# Patient Record
Sex: MALE | Race: WHITE | Employment: OTHER | URBAN - METROPOLITAN AREA
[De-identification: names, ages, dates, MRNs, and addresses within clinical notes are randomized per-mention and may not be internally consistent; named-entity substitution may affect disease eponyms.]

---

## 2018-11-01 PROBLEM — L72.3 SEBACEOUS CYST: Status: ACTIVE | Noted: 2018-11-01

## 2020-09-18 ENCOUNTER — HOSPITAL ENCOUNTER (EMERGENCY)
Age: 75
Discharge: HOME | End: 2020-09-19
Attending: EMERGENCY MEDICINE
Payer: MEDICARE

## 2020-09-18 ENCOUNTER — APPOINTMENT (OUTPATIENT)
Dept: GENERAL RADIOLOGY | Age: 75
End: 2020-09-18
Attending: EMERGENCY MEDICINE
Payer: MEDICARE

## 2020-09-18 ENCOUNTER — APPOINTMENT (OUTPATIENT)
Dept: CT IMAGING | Age: 75
End: 2020-09-18
Attending: EMERGENCY MEDICINE
Payer: MEDICARE

## 2020-09-18 DIAGNOSIS — E11.9 TYPE 2 DIABETES MELLITUS WITHOUT COMPLICATION, UNSPECIFIED WHETHER LONG TERM INSULIN USE (HCC): ICD-10-CM

## 2020-09-18 DIAGNOSIS — R42 DIZZINESS: Primary | ICD-10-CM

## 2020-09-18 LAB
ALBUMIN SERPL-MCNC: 4.2 G/DL (ref 3.2–4.6)
ALBUMIN/GLOB SERPL: 1.1 {RATIO} (ref 1.2–3.5)
ALP SERPL-CCNC: 96 U/L (ref 50–136)
ALT SERPL-CCNC: 38 U/L (ref 12–65)
ANION GAP SERPL CALC-SCNC: 7 MMOL/L (ref 7–16)
AST SERPL-CCNC: 25 U/L (ref 15–37)
BASOPHILS # BLD: 0.1 K/UL (ref 0–0.2)
BASOPHILS NFR BLD: 1 % (ref 0–2)
BILIRUB SERPL-MCNC: 0.5 MG/DL (ref 0.2–1.1)
BUN SERPL-MCNC: 13 MG/DL (ref 8–23)
CALCIUM SERPL-MCNC: 9.3 MG/DL (ref 8.3–10.4)
CHLORIDE SERPL-SCNC: 102 MMOL/L (ref 98–107)
CO2 SERPL-SCNC: 30 MMOL/L (ref 21–32)
CREAT SERPL-MCNC: 1.34 MG/DL (ref 0.8–1.5)
DIFFERENTIAL METHOD BLD: NORMAL
EOSINOPHIL # BLD: 0.2 K/UL (ref 0–0.8)
EOSINOPHIL NFR BLD: 2 % (ref 0.5–7.8)
ERYTHROCYTE [DISTWIDTH] IN BLOOD BY AUTOMATED COUNT: 13.3 % (ref 11.9–14.6)
GLOBULIN SER CALC-MCNC: 4 G/DL (ref 2.3–3.5)
GLUCOSE SERPL-MCNC: 125 MG/DL (ref 65–100)
HCT VFR BLD AUTO: 46.8 % (ref 41.1–50.3)
HGB BLD-MCNC: 15.2 G/DL (ref 13.6–17.2)
IMM GRANULOCYTES # BLD AUTO: 0 K/UL (ref 0–0.5)
IMM GRANULOCYTES NFR BLD AUTO: 0 % (ref 0–5)
LYMPHOCYTES # BLD: 2.6 K/UL (ref 0.5–4.6)
LYMPHOCYTES NFR BLD: 24 % (ref 13–44)
MAGNESIUM SERPL-MCNC: 2.6 MG/DL (ref 1.8–2.4)
MCH RBC QN AUTO: 29.2 PG (ref 26.1–32.9)
MCHC RBC AUTO-ENTMCNC: 32.5 G/DL (ref 31.4–35)
MCV RBC AUTO: 89.8 FL (ref 79.6–97.8)
MONOCYTES # BLD: 1 K/UL (ref 0.1–1.3)
MONOCYTES NFR BLD: 10 % (ref 4–12)
NEUTS SEG # BLD: 6.7 K/UL (ref 1.7–8.2)
NEUTS SEG NFR BLD: 63 % (ref 43–78)
NRBC # BLD: 0 K/UL (ref 0–0.2)
PLATELET # BLD AUTO: 265 K/UL (ref 150–450)
PMV BLD AUTO: 10.1 FL (ref 9.4–12.3)
POTASSIUM SERPL-SCNC: 3.4 MMOL/L (ref 3.5–5.1)
PROT SERPL-MCNC: 8.2 G/DL (ref 6.3–8.2)
RBC # BLD AUTO: 5.21 M/UL (ref 4.23–5.6)
SODIUM SERPL-SCNC: 139 MMOL/L (ref 136–145)
TROPONIN-HIGH SENSITIVITY: 8.2 PG/ML (ref 0–14)
WBC # BLD AUTO: 10.6 K/UL (ref 4.3–11.1)

## 2020-09-18 PROCEDURE — 85025 COMPLETE CBC W/AUTO DIFF WBC: CPT

## 2020-09-18 PROCEDURE — 83735 ASSAY OF MAGNESIUM: CPT

## 2020-09-18 PROCEDURE — 84484 ASSAY OF TROPONIN QUANT: CPT

## 2020-09-18 PROCEDURE — 99284 EMERGENCY DEPT VISIT MOD MDM: CPT

## 2020-09-18 PROCEDURE — 93005 ELECTROCARDIOGRAM TRACING: CPT

## 2020-09-18 PROCEDURE — 99285 EMERGENCY DEPT VISIT HI MDM: CPT

## 2020-09-18 PROCEDURE — 80053 COMPREHEN METABOLIC PANEL: CPT

## 2020-09-18 PROCEDURE — 71046 X-RAY EXAM CHEST 2 VIEWS: CPT

## 2020-09-19 ENCOUNTER — APPOINTMENT (OUTPATIENT)
Dept: CT IMAGING | Age: 75
End: 2020-09-19
Attending: EMERGENCY MEDICINE
Payer: MEDICARE

## 2020-09-19 VITALS
DIASTOLIC BLOOD PRESSURE: 64 MMHG | HEART RATE: 60 BPM | RESPIRATION RATE: 18 BRPM | SYSTOLIC BLOOD PRESSURE: 139 MMHG | WEIGHT: 220 LBS | TEMPERATURE: 98.6 F | OXYGEN SATURATION: 93 % | BODY MASS INDEX: 32.58 KG/M2 | HEIGHT: 69 IN

## 2020-09-19 LAB
ATRIAL RATE: 75 BPM
CALCULATED P AXIS, ECG09: 32 DEGREES
CALCULATED R AXIS, ECG10: -84 DEGREES
CALCULATED T AXIS, ECG11: 57 DEGREES
DIAGNOSIS, 93000: NORMAL
P-R INTERVAL, ECG05: 184 MS
Q-T INTERVAL, ECG07: 422 MS
QRS DURATION, ECG06: 164 MS
QTC CALCULATION (BEZET), ECG08: 471 MS
VENTRICULAR RATE, ECG03: 75 BPM

## 2020-09-19 PROCEDURE — 70450 CT HEAD/BRAIN W/O DYE: CPT

## 2020-09-19 NOTE — ED NOTES
Report given to LATA VENCES [facility], RN to assume care at this time.

## 2020-09-19 NOTE — ED TRIAGE NOTES
Arrives with face mask in place. Arrives from [place] [id number] with reports \"abnormal ekg\". Presented there with reports dizziness. Onset approx 1 week pta  However states with bending over, resolved with standing back up. Worsening of dizziness today approx 1400 while working in yard. Constant since onset today. Describes as room spinning and feeling \"as if going to pass out\". Denies chest pain, shortness of breath, syncope, shortness of breath, head pain, vision changes, n/v/d, cough, fever/chills, numbness/tingling.  A/O x3 on arrival. Reports \"prediabetes\"

## 2020-09-19 NOTE — ED PROVIDER NOTES
Patient has a history of hypertension and type 2 diabetes. States he was outside mowing his grass and working on the yard today when around 2 PM he started having some dizziness. He had gone from a bending position to standing up and felt dizzy. He states he felt like things were moving around him. His symptoms are worse when he moves around and better when he sits still. He denies any chest pain or shortness of breath. He has had no headache or fever. Denies any known sick contacts. He went to an outside urgent care and was told his EKG was abnormal and was sent here for further evaluation. He denies similar symptoms in the past, did not take any medicine for his symptoms.            Past Medical History:   Diagnosis Date    Arthritis     Hypertension     medication    Type II or unspecified type diabetes mellitus without mention of complication, not stated as uncontrolled 3/9/2015       Past Surgical History:   Procedure Laterality Date    ABDOMEN SURGERY PROC UNLISTED      right inguinal hernia repair    HX HEENT      Lasik    HX HEENT  11/14    skin cancer removed    HX ORTHOPAEDIC      left foot plantar fasciitis    HX ORTHOPAEDIC  3/13    right knee partial knee replacment - Dr. Joey Ly         Family History:   Problem Relation Age of Onset    Arthritis-osteo Mother     Dementia Mother     Hypertension Father     Cancer Father         prostate    Heart Disease Brother        Social History     Socioeconomic History    Marital status:      Spouse name: Not on file    Number of children: Not on file    Years of education: Not on file    Highest education level: Not on file   Occupational History    Not on file   Social Needs    Financial resource strain: Not on file    Food insecurity     Worry: Not on file     Inability: Not on file    Transportation needs     Medical: Not on file     Non-medical: Not on file   Tobacco Use    Smoking status: Former Smoker     Last attempt to quit: 3/12/1980     Years since quittin.5    Smokeless tobacco: Never Used   Substance and Sexual Activity    Alcohol use: No    Drug use: Never    Sexual activity: Not on file   Lifestyle    Physical activity     Days per week: Not on file     Minutes per session: Not on file    Stress: Not on file   Relationships    Social connections     Talks on phone: Not on file     Gets together: Not on file     Attends Congregational service: Not on file     Active member of club or organization: Not on file     Attends meetings of clubs or organizations: Not on file     Relationship status: Not on file    Intimate partner violence     Fear of current or ex partner: Not on file     Emotionally abused: Not on file     Physically abused: Not on file     Forced sexual activity: Not on file   Other Topics Concern    Not on file   Social History Narrative    Not on file         ALLERGIES: Pravachol [pravastatin]    Review of Systems   Constitutional: Negative for chills and fever. Gastrointestinal: Negative for nausea and vomiting. All other systems reviewed and are negative. Vitals:    20 2207   BP: (!) 192/99   Pulse: 80   Resp: 18   Temp: 98.6 °F (37 °C)   SpO2: 96%   Weight: 99.8 kg (220 lb)   Height: 5' 9\" (1.753 m)            Physical Exam  Vitals signs and nursing note reviewed. Constitutional:       Appearance: Normal appearance. He is well-developed. HENT:      Head: Normocephalic and atraumatic. Eyes:      Conjunctiva/sclera: Conjunctivae normal.      Pupils: Pupils are equal, round, and reactive to light. Neck:      Musculoskeletal: Normal range of motion and neck supple. Cardiovascular:      Rate and Rhythm: Normal rate and regular rhythm. Heart sounds: Normal heart sounds. Pulmonary:      Effort: Pulmonary effort is normal.      Breath sounds: Normal breath sounds. Abdominal:      General: Bowel sounds are normal.      Palpations: Abdomen is soft.    Musculoskeletal: Normal range of motion. Skin:     General: Skin is warm and dry. Neurological:      General: No focal deficit present. Mental Status: He is alert and oriented to person, place, and time. Cranial Nerves: No cranial nerve deficit. Sensory: No sensory deficit. Motor: No weakness. Comments: No nystagmus          MDM  Number of Diagnoses or Management Options  Dizziness: new and does not require workup  Type 2 diabetes mellitus without complication, unspecified whether long term insulin use [facility]):   Diagnosis management comments: 12:32 AM discussed results with patient, unremarkable work-up. He has a primary doctor he can follow-up with. His symptoms are very mild and he has no other neurological deficit. The strongly points away from a central lesion causing his dizziness. Discussed this with patient, need for close outpatient follow-up with his primary doctor.        Amount and/or Complexity of Data Reviewed  Clinical lab tests: reviewed and ordered  Tests in the radiology section of CPT®: ordered and reviewed  Tests in the medicine section of CPT®: ordered and reviewed    Risk of Complications, Morbidity, and/or Mortality  Presenting problems: moderate  Diagnostic procedures: moderate  Management options: moderate    Patient Progress  Patient progress: stable         Procedures

## 2020-09-19 NOTE — ED NOTES
I have reviewed discharge instructions with the patient and spouse. The patient and spouse verbalized understanding. Patient left ED via Discharge Method: ambulatory to Home with spouse    Opportunity for questions and clarification provided. Patient given 0 scripts. To continue your aftercare when you leave the hospital, you may receive an automated call from our care team to check in on how you are doing. This is a free service and part of our promise to provide the best care and service to meet your aftercare needs.  If you have questions, or wish to unsubscribe from this service please call [phone]. Thank you for Choosing our [address] [place] [address] Emergency Department.

## 2021-08-23 PROBLEM — N40.1 BPH WITH OBSTRUCTION/LOWER URINARY TRACT SYMPTOMS: Status: ACTIVE | Noted: 2021-08-23

## 2021-08-23 PROBLEM — N13.8 BPH WITH OBSTRUCTION/LOWER URINARY TRACT SYMPTOMS: Status: ACTIVE | Noted: 2021-08-23

## 2022-02-03 ENCOUNTER — HOSPITAL ENCOUNTER (OUTPATIENT)
Dept: PHYSICAL THERAPY | Age: 77
Discharge: HOME | End: 2022-02-03
Attending: SPECIALIST/TECHNOLOGIST
Payer: MEDICARE

## 2022-02-03 DIAGNOSIS — M25.512 LEFT SHOULDER PAIN, UNSPECIFIED CHRONICITY: ICD-10-CM

## 2022-02-03 PROCEDURE — 97162 PT EVAL MOD COMPLEX 30 MIN: CPT

## 2022-02-03 PROCEDURE — 97110 THERAPEUTIC EXERCISES: CPT

## 2022-02-03 NOTE — THERAPY EVALUATION
Elayne Zafar  : 1945  Primary: Valentin Lerma Medicare Advantage  Secondary:  [address] at [facility]  [address] [place], [address] [place] [address] Avenue, [place] pier[place], [address] [place] [address]  [phone]   [phone]       OUTPATIENT PHYSICAL THERAPY:Initial Assessment 2022    ICD-10: Treatment Diagnosis: Pain in Left Shoulder (M25.512)              Treatment Diagnosis 2: Stiffness of Left Shoulder not elsewhere specified (M25.612)              Treatment Diagnosis 3: Strain of muscle(s) and tendon(s) of the rotator cuff of Left Shoulder, sequela (S46.012S)                   Precautions: None  Allergies: Pravachol [pravastatin]   TREATMENT PLAN:  Effective Dates: 2/3/2022 TO 2022 (60 days). Frequency/Duration: 1 to 2 times a week for 60 Day(s) MEDICAL/REFERRING DIAGNOSIS:  Left shoulder pain, unspecified chronicity [M25.512]   DATE OF ONSET: Patient reports pain started approximately two months ago in 2021. REFERRING PHYSICIAN: Lexie Spann NP MD Orders: Evaluate and Treat   Return MD Appointment: 3/9/2022     INITIAL ASSESSMENT:  Mr. Jose Hale is a 77 y.o. male presenting to physical therapy with complaints of L shoulder pain. Patient reports pain started approximately two months ago and has progressively gotten worse with no MICHAEL. Patient reports pain is sharp and limits him from raising and lifting his arm. He denies any numbness and tingling at this time. Patient met with orthopedic MD and has received an injection on 2022. Patient reports no relief and no change in symptoms since injection. At this time he currently is having issues performing ADLs as well as performing routine tasks around the house. He also enjoys golf but has been limited secondary to his shoulder pain. He would like to be able to restore function and decrease pain in order to resume golf as well as perform all ADLs and navigate his home and community environment without pain or dysfunction.  Patient presents with increased pain, decreased strength, decreased ROM, decreased flexibility, impaired posture, impaired overhead reach, decreased activity tolerance, and overall impaired functional mobility. Patient is a good candidate for skilled physical therapy interventions to include manual therapy, therapeutic exercise, postural re-education, body mechanics training, and pain modalities as needed to address and improve deficits to progress functional independence. PROBLEM LIST (Impacting functional limitations):  1. Decreased Strength  2. Decreased ADL/Functional Activities  3. Increased Pain  4. Decreased Activity Tolerance  5. Decreased Flexibility/Joint Mobility  6. Decreased [place] with Home Exercise Program INTERVENTIONS PLANNED: (Treatment may consist of any combination of the following)  1. Cold  2. Cryotherapy  3. Electrical Stimulation  4. Heat  5. Home Exercise Program (HEP)  6. Manual Therapy  7. Neuromuscular Re-education/Strengthening  8. Range of Motion (ROM)  9. Therapeutic Activites  10. Therapeutic Exercise/Strengthening  11. Transcutaneous Electrical Nerve Stimulation (TENS)   12. Dry Needling     GOALS: (Goals have been discussed and agreed upon with patient.)  Short-Term Functional Goals: Time Frame: 2/3/2022 to 3/3/2022  1. Patient demonstrates independence with home exercise program without verbal cueing provided by therapist.   2. Patient will report no more than 4/10 L shoulder pain at rest in order to demonstrate improved self pain control and tolerance. 3. Patient will be educated in and demonstrate improved upright posture including decreased forward head and increased posterior tilt to scapula to reduce symptoms and improve range of motion of L shoulder. 4. Patient will demonstrate improved external rotation to 60 degrees in order to wash the back of his head. Discharge Goals: Time Frame: 2/3/2022 to 4/7/2022  1.  Patient will report pain no more than 1/10 L shoulder pain at rest in order to tolerate sleeping at night. 2. Patient will demonstrate improved strength to 4+/5 bilat UE strength in order to raise and lift 20 pounds while performing routine tasks around his home. 3. Patient will demonstrate 165 degrees of pain free flexion in order to reach the upper cabinets in his kitchen. 4. Patient will improve Disability of the Arm, Shoulder, and Hand score to under 15/55 from 26/55. Outcome Measure: Tool Used: Disabilities of the Arm, Shoulder and Hand (DASH) Questionnaire - Quick Version  Score:  Initial: 26/55  Most Recent: X/55 (Date: -- )   Interpretation of Score: The DASH is designed to measure the activities of daily living in person's with upper extremity dysfunction or pain. Each section is scored on a 1-5 scale, 5 representing the greatest disability. The scores of each section are added together for a total score of 55. Medical Necessity:   · Patient is expected to demonstrate progress in strength, range of motion, balance, coordination and functional technique to improve ability to perform ADLs as well as navigate home and community environment with ability to raise and lift bilat UE overhead. · Skilled intervention continues to be required due to decreased activity tolerance, decreased range of motion, decreased strength, and increased pain. Reason for Services/Other Comments:  · Patient continues to require skilled intervention due to increasing complexity of exercises within graded exercise program.  Total Evaluation Duration: 30 minutes    Rehabilitation Potential For Stated Goals: Good  Regarding Alex Patel's therapy, I certify that the treatment plan above will be carried out by a therapist or under their direction.   Thank you for this referral,  Stacey Romero PT     Referring Physician Signature: Cal Yañez NP _______________________________ Date _____________             PAIN/SUBJECTIVE:    Initial: Pain Intensity 1: 7  Pain Location 1: Shoulder  Pain Orientation 1: Left  Post Session:  7/10    HISTORY:    History of Injury/Illness (Reason for Referral):  Mr. Tori Evans is a 77 y.o. male presenting to physical therapy with complaints of L shoulder pain. Patient reports pain started approximately two months ago and has progressively gotten worse with no MICHAEL. Patient reports pain is sharp and limits him from raising and lifting his arm. He denies any numbness and tingling at this time. Patient met with orthopedic MD and has received an injection on 1/26/2022. Patient reports no relief and no change in symptoms since injection. At this time he currently is having issues performing ADLs as well as performing routine tasks around the house. He also enjoys golf but has been limited secondary to his shoulder pain. He would like to be able to restore function and decrease pain in order to resume golf as well as perform all ADLs and navigate his home and community environment without pain or dysfunction. Past Medical History/Comorbidities:   Mr. Tori Evans  has a past medical history of Arthritis, Hypertension, and Type II or unspecified type diabetes mellitus without mention of complication, not stated as uncontrolled (3/9/2015). He has no past medical history of Aneurysm ([address] Arrhythmia, Asthma, Autoimmune disease ([address] CAD (coronary artery disease), Cancer ([address] Chronic kidney disease, Chronic pain, Coagulation defects, COPD, Difficult intubation, GERD (gastroesophageal reflux disease), Heart failure ([address] Liver disease, Malignant hyperthermia due to anesthesia, Morbid obesity ([address] Nausea & vomiting, Other ill-defined conditions(799.89), Pseudocholinesterase deficiency, Psychiatric disorder, PUD (peptic ulcer disease), Seizures ([address] Stroke ([address] Thromboembolus ([address] Thyroid disease, Unspecified adverse effect of anesthesia, or Unspecified sleep apnea.   Mr. Tori Evans  has a past surgical history that includes pr abdomen surgery proc unlisted; hx orthopaedic; hx orthopaedic (3/13); hx heent; hx heent (11/14); and hx stem cell transplant (05/01/2021). Social History/Living Environment:     Patient lives with family and is active in the community and with social group playing golf. Prior Level of Function/Work/Activity:  Patient reports no difficulty performing ADLs, navigating home and community environment, or playing golf prior to onset of pain. Personal Factors:          Past/Current Experience:  Prior level of function. Ambulatory/Rehab Services H2 Model Falls Risk Assessment    Risk Factors:       (1)  Gender [Male] Ability to Rise from Chair:       (0)  Ability to rise in a single movement    Falls Prevention Plan:       No modifications necessary    Total: (5 or greater = High Risk): 1    ©2010 [facility] of [place]. All Rights Reserved. [facility] Patent #[id number]. Federal Law prohibits the replication, distribution or use without written permission from [facility] [place]    Current Medications:        Current Outpatient Medications:     fluoride, sodium, 1.1 % pste, , Disp: , Rfl:     APPLE CIDER VINEGAR PO, Take  by mouth., Disp: , Rfl:     tamsulosin (FLOMAX) 0.4 mg capsule, Take 1 Capsule by mouth daily. , Disp: 90 Capsule, Rfl: 3    glucose blood VI test strips (ASCENSIA AUTODISC VI, ONE TOUCH ULTRA TEST VI) strip, Check daily for diabetes mellitus (E11.9), Disp: 100 Strip, Rfl: 3    lancets (Accu-Chek FastClix Lancing Dev) misc, CHECK  SUGARS ONE TIME DAILY for E11.9, Disp: 100 Each, Rfl: 3    losartan-hydroCHLOROthiazide (HYZAAR) 100-25 mg per tablet, TAKE 1 TABLET DAILY, Disp: 90 Tablet, Rfl: 3    metoprolol tartrate (LOPRESSOR) 25 mg tablet, TAKE 1 TABLET TWICE A DAY, Disp: 180 Tablet, Rfl: 3    atorvastatin (LIPITOR) 20 mg tablet, TAKE 1 TABLET DAILY, Disp: 90 Tablet, Rfl: 3    baclofen (LIORESAL) 10 mg tablet, Take 1 Tablet by mouth three (3) times daily as needed for Muscle Spasm(s). , Disp: 30 Tablet, Rfl: 0    turmeric 400 mg cap, Take  by mouth., Disp: , Rfl:     coenzyme q10-vitamin e (COQ10 ) 100-100 mg-unit cap, Take  by mouth., Disp: , Rfl:     PV W-O SIMEON/FERROUS FUMARATE/FA (M-VIT PO), Take  by mouth every Monday and Friday., Disp: , Rfl:     Date Last Reviewed:  2/6/2022    Number of Personal Factors/Comorbidities that affect the Plan of Care:  Patient is moderate complexity based off of prior level of function and past medical history. 1-2: MODERATE COMPLEXITY    EXAMINATION:    Patient denies any headaches, changes in vision, dizziness, vertigo, nausea, drop attacks, black outs, tinnitus, dysphagia, dysarthria, LE symptoms or bowel/bladder dysfunction. Observation/Orthostatic Postural Assessment:          Patient sits and stands with slight forward head and anterior tilt to scapula. Palpation:          Patient tender to palpation diffusely through L shoulder and L cervical region. ROM:          NT: not tested        WFL: within functional limits   AROM/ PROM Left (degrees) Right (degrees)   Shoulder Flexion 90 165   Shoulder Abduction 85 160   Shoulder Internal Rotation  35 70   Functional Internal Rotation Greater trochanter L 1   Shoulder External Rotation 20 88   Functional External Rotation Occiput C7     Strength:             Motion Tested Left   (*/5) Right  (*/5)   Shoulder Flexion 2+ 4+   Scaption 2+ 4+   Shoulder Abduction 2+ 4+   Shoulder Internal Rotation  3- 4+   Shoulder External Rotation 3- 4   Shoulder Internal Rotation   (90 abduction) NT secondary to irritability 4   Shoulder External Rotation   (90 abduction) NT secondary to irritability 4-   Elbow Flexion 4- 5   Elbow Extension 4- 5   Wrist Flexion 4 5   Wrist Extension 4 5    (in lbs): arm at side with elbow flexed to 90 degrees Get during next visit Get during next visit     Special Tests:     Impingement tests:  Reza-Nehalem test: Positive L, Negative R  Neer test: Positive L, Negative R  Painful arc:  Positive L, Negative R  Rotator Cuff:  Lift off test: Positive L, Negative R  Drop sign: Negative Bilat  IR Lag test: Negative Bilat  ER Lag test:   Positive L, Negative R    Passive Accessory Motion:         Decreased and painful motion of L UE  Neurological Screen:              Myotomes: Key muscle strength testing through bilateral UE is [facility]. Dermatomes: Sensation to light touch for bilateral UE is intact to light touch. Reflexes:          Biceps (C5): 2+         Brachioradialis (C6): 2+        Triceps (C7): 2+    Functional Mobility:         Overhead reach: painful on L UE  Balance:          NT secondary to nature of visit      Body Structures Involved:  1. Bones  2. Joints  3. Muscles  4. Ligaments Body Functions Affected:  1. Sensory/Pain  2. Neuromusculoskeletal  3. Movement Related Activities and Participation Affected:  1. General Tasks and Demands  2. Mobility  3. Self Care  4. Domestic Life  5. Interpersonal Interactions and Relationships  6.  Community, Social and [place]    Number of elements (examined above) that affect the Plan of Care: 3: MODERATE COMPLEXITY    CLINICAL PRESENTATION:    Presentation:   Evolving clinical presentation with changing clinical characteristics: MODERATE COMPLEXITY    CLINICAL DECISION MAKING:    Use of outcome tool(s) and clinical judgement create a POC that gives a: Questionable prediction of patient's progress: MODERATE COMPLEXITY

## 2022-02-03 NOTE — PROGRESS NOTES
Yrn Luba  : 1945  Primary: Laurent Valdez Medicare Advantage  Secondary:  [address] at [facility]  [address], [address] [facility], [address]  [phone]   [phone]      OUTPATIENT PHYSICAL THERAPY: Daily Treatment Note 2/3/2022    ICD-10: Treatment Diagnosis: Pain in Left Shoulder (M25.512)              Treatment Diagnosis 2: Stiffness of Left Shoulder not elsewhere specified (M25.612)              Treatment Diagnosis 3: Strain of muscle(s) and tendon(s) of the rotator cuff of Left Shoulder, sequela (S46.012S)                   Precautions: None  Allergies: Pravachol [pravastatin]   TREATMENT PLAN:  Effective Dates: 2/3/2022 TO 2022 (60 days). Frequency/Duration: 1 to 2 times a week for 60 Day(s) MEDICAL/REFERRING DIAGNOSIS:  Left shoulder pain, unspecified chronicity [M25.512]   DATE OF ONSET: Patient reports pain started approximately two months ago in 2021. REFERRING PHYSICIAN: Melissa Fragoso NP MD Orders: Evaluate and Treat   Return MD Appointment: 3/9/2022     Pre-treatment Symptoms/Complaints:  Patient is agreeable and optimistic about therapy. Pain: Initial: Pain Intensity 1: 7  Pain Location 1: Shoulder  Pain Orientation 1: Left  Post Session:  7/10   Medications Last Reviewed:  2022  Updated Objective Findings:  See evaluation note from today   TREATMENT:   THERAPEUTIC EXERCISE: (15 minutes):  Exercises per grid below to improve mobility, strength, balance and coordination. Required minimal visual, verbal, manual and tactile cues to promote proper body alignment, promote proper body posture, promote proper body mechanics and promote proper body breathing techniques. Progressed resistance, range, repetitions and complexity of movement as indicated.      Date:  2/3/2022 Date:   Date:     Activity/Exercise Parameters Parameters Parameters   Table Walkout x10 holding 5 sec     Physioball Rollout  x20 holding 5 sec External Rotation 2x10 isometric focus with 3 sec hold red band supine     Mid Row 2x10 green band standing     Pulleys Next session                   Time spent with patient reviewing proper muscle recruitment and technique with exercises. MANUAL THERAPY: (0 minutes): Joint mobilization and Soft tissue mobilization was utilized and necessary because of the patient's restricted joint motion and painful spasm   None toady    MODALITIES: (0 minutes):      None today     HEP: As above; handouts given to patient for all exercises. Treatment/Session Summary:    · Response to Treatment:  Patient tolerated session well with no increase in symptoms and ability to demonstrate and verbalize understanding of HEP. Griselda Sweeney · Baseline vitals (2/3/2022): Not tested today  · Communication/Consultation:  Discussed HEP and POC with patient of which he was able to verbalize and demonstrate understanding  · Equipment provided today:  Greena and red band. · Recommendations/Intent for next treatment session: Next visit will focus on graded exercise program to improve activity tolerance, range of motion, strength, and decrease pain centered on UE.     Total Treatment Billable Duration:  45 minutes: 30 evaluation, 15 minutes therapeutic exercise   PT Patient Time In/Time Out  Time In: 1000  Time Out: 0953 Ashly Mcdaniels, PT

## 2022-02-10 ENCOUNTER — HOSPITAL ENCOUNTER (OUTPATIENT)
Dept: PHYSICAL THERAPY | Age: 77
Discharge: HOME | End: 2022-02-10
Attending: SPECIALIST/TECHNOLOGIST
Payer: MEDICARE

## 2022-02-10 PROCEDURE — 97140 MANUAL THERAPY 1/> REGIONS: CPT

## 2022-02-10 PROCEDURE — 97110 THERAPEUTIC EXERCISES: CPT

## 2022-02-10 NOTE — PROGRESS NOTES
Kavin Saleh  : 1945  Primary: Teresa Mac Medicare Advantage  Secondary:  [address] at [facility]  [address] [place], [address] [place] [address], [place], [address] [place] [address]  [phone]   [phone]      OUTPATIENT PHYSICAL THERAPY: Daily Treatment Note 2/10/2022    ICD-10: Treatment Diagnosis: Pain in Left Shoulder (M25.512)              Treatment Diagnosis 2: Stiffness of Left Shoulder not elsewhere specified (M25.612)              Treatment Diagnosis 3: Strain of muscle(s) and tendon(s) of the rotator cuff of Left Shoulder, sequela (S46.012S)                   Precautions: None  Allergies: Pravachol [pravastatin]   TREATMENT PLAN:  Effective Dates: 2/3/2022 TO 2022 (60 days). Frequency/Duration: 1 to 2 times a week for 60 Day(s) MEDICAL/REFERRING DIAGNOSIS:  Left shoulder pain, unspecified chronicity [M25.512]   DATE OF ONSET: Patient reports pain started approximately two months ago in 2021. REFERRING PHYSICIAN: Dawn Melvin NP MD Orders: Evaluate and Treat   Return MD Appointment: 3/9/2022     Pre-treatment Symptoms/Complaints:  Patient reports no pain at rest. Pain increases with active movement mainly above shoulder level. Pain: Initial: Pain Intensity 1: 5  Pain Location 1: Shoulder  Pain Orientation 1: Left  Post Session:  5/10   Medications Last Reviewed:  2/10/2022  Updated Objective Findings:  None Today   TREATMENT:   THERAPEUTIC EXERCISE: (25 minutes):  Exercises per grid below to improve mobility, strength, balance and coordination. Required minimal visual, verbal, manual and tactile cues to promote proper body alignment, promote proper body posture, promote proper body mechanics and promote proper body breathing techniques. Progressed resistance, range, repetitions and complexity of movement as indicated.      Date:  2/3/2022 Date:  2/10/22 Date:     Activity/Exercise Parameters Parameters Parameters   Table Walkout x10 holding 5 sec 15 x 5 sec Physioball Rollout  x20 holding 5 sec     External Rotation 2x10 isometric focus with 3 sec hold red band supine Red band 3 x 10    Mid Row 2x10 green band standing Green 2 x 15    Pulleys Next session  minutes scaption    Low row  Green 2 x 15    Scapular retraction  1 x 10  1/2 roll 2 x 10    Scaption   Active x 10            Time spent with patient reviewing proper muscle recruitment and technique with exercises. MANUAL THERAPY: (28 minutes): Joint mobilization and Soft tissue mobilization was utilized and necessary because of the patient's restricted joint motion and painful spasm   Soft tissue mobilization to anterior shoulder,lateral arm and teres in sitting   Glenohumeral joint mobilization and gentle distraction in supine    MODALITIES: (0 minutes):      None today     HEP: As above; handouts given to patient for all exercises. Treatment/Session Summary:    · Response to Treatment:  Tolerated exercises well. .  · Baseline vitals (2/3/2022): Not tested today  · Communication/Consultation:  HEP   · Equipment provided today:  pulleys   · Recommendations/Intent for next treatment session: Next visit will focus on graded exercise program to improve activity tolerance, range of motion, strength, and decrease pain centered on UE.     Total Treatment Billable Duration:  53 minutes:   PT Patient Time In/Time Out  Time In: 0681  Time Out: Bunny Dolan 3885, PTA

## 2022-02-11 ENCOUNTER — HOSPITAL ENCOUNTER (OUTPATIENT)
Dept: PHYSICAL THERAPY | Age: 77
Discharge: HOME | End: 2022-02-11
Attending: SPECIALIST/TECHNOLOGIST
Payer: MEDICARE

## 2022-02-14 ENCOUNTER — HOSPITAL ENCOUNTER (OUTPATIENT)
Dept: PHYSICAL THERAPY | Age: 77
Discharge: HOME | End: 2022-02-14
Attending: SPECIALIST/TECHNOLOGIST
Payer: MEDICARE

## 2022-02-14 PROCEDURE — 97140 MANUAL THERAPY 1/> REGIONS: CPT

## 2022-02-14 PROCEDURE — 97110 THERAPEUTIC EXERCISES: CPT

## 2022-02-14 NOTE — PROGRESS NOTES
Emani Henry  : 1945  Primary: Kusum Brumfield Medicare Advantage  Secondary:  [address] at [facility]  [address] [place], [facility], [place], [address] [place] [address]  [phone]   [phone]      OUTPATIENT PHYSICAL THERAPY: Daily Treatment Note 2022    ICD-10: Treatment Diagnosis: Pain in Left Shoulder (M25.512)              Treatment Diagnosis 2: Stiffness of Left Shoulder not elsewhere specified (M25.612)              Treatment Diagnosis 3: Strain of muscle(s) and tendon(s) of the rotator cuff of Left Shoulder, sequela (S46.012S)                   Precautions: None  Allergies: Pravachol [pravastatin]   TREATMENT PLAN:  Effective Dates: 2/3/2022 TO 2022 (60 days). Frequency/Duration: 1 to 2 times a week for 60 Day(s) MEDICAL/REFERRING DIAGNOSIS:  Left shoulder pain, unspecified chronicity [M25.512]   DATE OF ONSET: Patient reports pain started approximately two months ago in 2021. REFERRING PHYSICIAN: Ariel Grant NP MD Orders: Evaluate and Treat   Return MD Appointment: 3/9/2022     Pre-treatment Symptoms/Complaints:  Patient reports pain is becoming less sharp with slow improvement in symptoms. Pain: Initial: Pain Intensity 1: 4  Pain Location 1: Shoulder  Pain Orientation 1: Left  Post Session:  3/10   Medications Last Reviewed:  2022  Updated Objective Findings:  Patient has pain at flexion 140 degrees. TREATMENT:   THERAPEUTIC EXERCISE: (30 minutes):  Exercises per grid below to improve mobility, strength, balance and coordination. Required minimal visual, verbal, manual and tactile cues to promote proper body alignment, promote proper body posture, promote proper body mechanics and promote proper body breathing techniques. Progressed resistance, range, repetitions and complexity of movement as indicated.      Date:  2/3/2022 Date:  2/10/22 Date:  2022   Activity/Exercise Parameters Parameters Parameters   Table Walkout x10 holding 5 sec 15 x 5 sec 15 x 5 sec   Physioball Rollout  x20 holding 5 sec  x20 holding 5 sec   External Rotation 2x10 isometric focus with 3 sec hold red band supine Red band 3 x 10 3x15 with green band supine    3x10 with roller and green band standing    Mid Row 2x10 green band standing Green 2 x 15 3x15 green band   Pulleys Next session  minutes scaption 5 minutes scaption   Low row  Green 2 x 15 Green 3x15   Scapular retraction  1 x 10  1/2 roll 2 x 10 1 x 10  1/2 roll 2 x 10   Scaption   Active x 10 Active 2x10   Weighted Carry   3x25 feet with 15 pounds suitcase carry    Wall Slide   2x10 with pillow on wall in external rotated position (thumbs up)     Time spent with patient reviewing proper muscle recruitment and technique with exercises. MANUAL THERAPY: (28 minutes): Joint mobilization and Soft tissue mobilization was utilized and necessary because of the patient's restricted joint motion and painful spasm   Soft tissue mobilization to anterior shoulder,lateral arm and teres in sitting   Glenohumeral joint mobilization and gentle distraction in supine    MODALITIES: (0 minutes):      None today     HEP: As above; handouts given to patient for all exercises. Treatment/Session Summary:    · Response to Treatment:  Patient able to demonstrate improved flexion to 148 degrees following session and reported decreased pain. Increased overall volume as well as added AROM flexion against wall. · Baseline vitals (2/3/2022): Not tested today  · Communication/Consultation:  HEP   · Equipment provided today:  pulleys   · Recommendations/Intent for next treatment session: Next visit will focus on graded exercise program to improve activity tolerance, range of motion, strength, and decrease pain centered on UE.     Total Treatment Billable Duration:  58 minutes:   PT Patient Time In/Time Out  Time In: 1330  Time Out: 201[address] [facility], PT

## 2022-02-17 ENCOUNTER — HOSPITAL ENCOUNTER (OUTPATIENT)
Dept: PHYSICAL THERAPY | Age: 77
Discharge: HOME | End: 2022-02-17
Attending: SPECIALIST/TECHNOLOGIST
Payer: MEDICARE

## 2022-02-17 PROCEDURE — 97110 THERAPEUTIC EXERCISES: CPT

## 2022-02-17 PROCEDURE — 97140 MANUAL THERAPY 1/> REGIONS: CPT

## 2022-02-17 NOTE — PROGRESS NOTES
Emani Henry  : 1945  Primary: Kusum Brumfield Medicare Advantage  Secondary:  [address] at [facility]  [address], [address], [place], [address] [place] [address]  [phone]   [phone]      OUTPATIENT PHYSICAL THERAPY: Daily Treatment Note 2022    ICD-10: Treatment Diagnosis: Pain in Left Shoulder (M25.512)              Treatment Diagnosis 2: Stiffness of Left Shoulder not elsewhere specified (M25.612)              Treatment Diagnosis 3: Strain of muscle(s) and tendon(s) of the rotator cuff of Left Shoulder, sequela (S46.012S)                   Precautions: None  Allergies: Pravachol [pravastatin]   TREATMENT PLAN:  Effective Dates: 2/3/2022 TO 2022 (60 days). Frequency/Duration: 1 to 2 times a week for 60 Day(s) MEDICAL/REFERRING DIAGNOSIS:  Left shoulder pain, unspecified chronicity [M25.512]   DATE OF ONSET: Patient reports pain started approximately two months ago in 2021. REFERRING PHYSICIAN: Ariel Grant NP MD Orders: Evaluate and Treat   Return MD Appointment: 3/9/2022     Pre-treatment Symptoms/Complaints:  Patient reports feeling a little better however continues to have intermittent anterior shoulder pain with movement     Pain: Initial: Pain Intensity 1: 5  Pain Location 1: Shoulder  Pain Orientation 1: Left  Post Session:  3/10   Medications Last Reviewed:  2022  Updated Objective Findings:  Patient has pain at flexion 140 degrees. TREATMENT:   THERAPEUTIC EXERCISE: (30 minutes):  Exercises per grid below to improve mobility, strength, balance and coordination. Required minimal visual, verbal, manual and tactile cues to promote proper body alignment, promote proper body posture, promote proper body mechanics and promote proper body breathing techniques. Progressed resistance, range, repetitions and complexity of movement as indicated.      Date:  2022 Date:  2/10/22 Date:  2022   Activity/Exercise Parameters Parameters Parameters   Table Walkout x10 holding 5 sec 15 x 5 sec 15 x 5 sec   Physioball Rollout  x20 holding 5 sec  x20 holding 5 sec   External Rotation Seated green  X 10 1/2 roll Red band 3 x 10 3x15 with green band supine    3x10 with roller and green band standing    Mid Row Standing blue 3 x 15 Green 2 x 15 3x15 green band   Pulleys 5 minutes  minutes scaption 5 minutes scaption   Low row Blue  3 X 10 Green 2 x 15 Green 3x15   Scapular retraction 2 x 10 1/2 roll 1 x 10  1/2 roll 2 x 10 1 x 10  1/2 roll 2 x 10   Scaption  1 x 10 stopped due to pain Active x 10 Active 2x10   Weighted Carry 3 x 50 ft 15 lbs  3x25 feet with 15 pounds suitcase carry    Wall Slide   2x10 with pillow on wall in external rotated position (thumbs up)     Time spent with patient reviewing proper muscle recruitment and technique with exercises. MANUAL THERAPY: (23 minutes): Joint mobilization and Soft tissue mobilization was utilized and necessary because of the patient's restricted joint motion and painful spasm   Soft tissue mobilization to anterior shoulder,lateral arm and teres in sitting   Glenohumeral joint mobilization and gentle distraction in supine    MODALITIES: (0 minutes):      None today     HEP: As above; handouts given to patient for all exercises. Treatment/Session Summary:    · Response to Treatment:  Patient with improved scapular control after instruction. Intermittent pain with active movements at or above shoulder level. l. · Baseline vitals (2/3/2022): Not tested today  · Communication/Consultation:  HEP   · Equipment provided today:  None today  · Recommendations/Intent for next treatment session: Next visit will focus on graded exercise program to improve activity tolerance, range of motion, strength, and decrease pain centered on UE.     Total Treatment Billable Duration:  53 minutes:   PT Patient Time In/Time Out  Time In: 9207  Time Out: Norris 50, PTA

## 2022-02-21 ENCOUNTER — HOSPITAL ENCOUNTER (OUTPATIENT)
Dept: PHYSICAL THERAPY | Age: 77
Discharge: HOME | End: 2022-02-21
Attending: SPECIALIST/TECHNOLOGIST
Payer: MEDICARE

## 2022-02-21 PROCEDURE — 97110 THERAPEUTIC EXERCISES: CPT

## 2022-02-21 PROCEDURE — 97140 MANUAL THERAPY 1/> REGIONS: CPT

## 2022-02-21 NOTE — PROGRESS NOTES
Antoinette Wiggins  : 1945  Primary: Mariana Coleman Medicare Advantage  Secondary:  [address] at [facility]  [address] [place], [address] [place] [address], [facility], [address] [place] [address]  [phone]   [phone]      OUTPATIENT PHYSICAL THERAPY: Daily Treatment Note 2022    ICD-10: Treatment Diagnosis: Pain in Left Shoulder (M25.512)              Treatment Diagnosis 2: Stiffness of Left Shoulder not elsewhere specified (M25.612)              Treatment Diagnosis 3: Strain of muscle(s) and tendon(s) of the rotator cuff of Left Shoulder, sequela (S46.012S)                   Precautions: None  Allergies: Pravachol [pravastatin]   TREATMENT PLAN:  Effective Dates: 2/3/2022 TO 2022 (60 days). Frequency/Duration: 1 to 2 times a week for 60 Day(s) MEDICAL/REFERRING DIAGNOSIS:  Left shoulder pain, unspecified chronicity [M25.512]   DATE OF ONSET: Patient reports pain started approximately two months ago in 2021. REFERRING PHYSICIAN: Cierra Lynn NP MD Orders: Evaluate and Treat   Return MD Appointment: 3/9/2022     Pre-treatment Symptoms/Complaints:  Patient feels like he is progressing with still minor pain at catch between  degrees shoulder flexion    Pain: Initial: Pain Intensity 1: 5  Pain Location 1: Shoulder  Pain Orientation 1: Left  Post Session:  3/10   Medications Last Reviewed:  2022  Updated Objective Findings:  Patient has pain at flexion 145 degrees. TREATMENT:   THERAPEUTIC EXERCISE: (40 minutes):  Exercises per grid below to improve mobility, strength, balance and coordination. Required minimal visual, verbal, manual and tactile cues to promote proper body alignment, promote proper body posture, promote proper body mechanics and promote proper body breathing techniques. Progressed resistance, range, repetitions and complexity of movement as indicated.      Date:  2022 Date:  2022 Date:  2022   Activity/Exercise Parameters Parameters Parameters   Table Walkout x10 holding 5 sec 15 x 5 sec 15 x 5 sec   Physioball Rollout  x20 holding 5 sec x20 holding 5 sec x20 holding 5 sec   External Rotation Seated green  X 10 1/2 roll  Supine 2x10 green band     Standing green  X 10 1/2 roll 3x15 with green band supine    3x10 with roller and green band standing    Mid Row Standing blue 3 x 15 Standing blue 3 x 15 3x15 green band   Pulleys 5 minutes 5 minutes scaption 5 minutes scaption   Low row Blue  3 X 10 Blue 3 x 15 Green 3x15   Scapular retraction 2 x 10 1/2 roll 1 x 10  1/2 roll 2 x 10 1 x 10  1/2 roll 2 x 10   Scaption  1 x 10 stopped due to pain Active x 10 Active 2x10   Weighted Carry 3 x 50 ft 15 lbs 3 x 50 ft 15 lbs 3x25 feet with 15 pounds suitcase carry    Wall Slide  2x10 with pillow on wall in external rotated position (thumbs up) 2x10 with pillow on wall in external rotated position (thumbs up)   Supine Pullover  3x5 with 5 pounds             Time spent with patient reviewing proper muscle recruitment and technique with exercises. MANUAL THERAPY: (15 minutes): Joint mobilization and Soft tissue mobilization was utilized and necessary because of the patient's restricted joint motion and painful spasm   Soft tissue mobilization to anterior shoulder,lateral arm and teres in sitting   Glenohumeral joint mobilization and gentle distraction in supine    MODALITIES: (0 minutes):      None today     HEP: As above; handouts given to patient for all exercises. Treatment/Session Summary:    · Response to Treatment:  Added supine pullover which he was able to perform without any pain or discomfort. Patient still has pain with flexion around  degrees that is reduced with cuing for shoulder in packed position of depression and posterior tilt to scapula. · Baseline vitals (2/3/2022):  Not tested today  · Communication/Consultation:  HEP   · Equipment provided today:  None today  · Recommendations/Intent for next treatment session: Next visit will focus on graded exercise program to improve activity tolerance, range of motion, strength, and decrease pain centered on UE.     Total Treatment Billable Duration:  55 minutes:   PT Patient Time In/Time Out  Time In: 1330  Time Out: 201[address] [facility], PT

## 2022-02-23 ENCOUNTER — HOSPITAL ENCOUNTER (OUTPATIENT)
Dept: PHYSICAL THERAPY | Age: 77
Discharge: HOME | End: 2022-02-23
Attending: SPECIALIST/TECHNOLOGIST
Payer: MEDICARE

## 2022-02-23 PROCEDURE — 97110 THERAPEUTIC EXERCISES: CPT

## 2022-02-23 PROCEDURE — 97140 MANUAL THERAPY 1/> REGIONS: CPT

## 2022-02-23 NOTE — PROGRESS NOTES
Edmond Chirinos  : 1945  Primary: Teodoro Baker Medicare Advantage  Secondary:  [address] at [facility]  [address], [address] [place] [address], [facility], [address] [place] [address] Street  [phone]   [phone]      OUTPATIENT PHYSICAL THERAPY: Daily Treatment Note 2022    ICD-10: Treatment Diagnosis: Pain in Left Shoulder (M25.512)              Treatment Diagnosis 2: Stiffness of Left Shoulder not elsewhere specified (M25.612)              Treatment Diagnosis 3: Strain of muscle(s) and tendon(s) of the rotator cuff of Left Shoulder, sequela (S46.012S)                   Precautions: None  Allergies: Pravachol [pravastatin]   TREATMENT PLAN:  Effective Dates: 2/3/2022 TO 2022 (60 days). Frequency/Duration: 1 to 2 times a week for 60 Day(s) MEDICAL/REFERRING DIAGNOSIS:  Left shoulder pain, unspecified chronicity [M25.512]   DATE OF ONSET: Patient reports pain started approximately two months ago in 2021. REFERRING PHYSICIAN: Cal Yañez NP MD Orders: Evaluate and Treat   Return MD Appointment: 3/9/2022     Pre-treatment Symptoms/Complaints:  Patient reports some improvement. Continues to report intermittent pain with FE     Pain: Initial: Pain Intensity 1: 0 (0/10 at rest  1/36 with certain movements)  Pain Location 1: Shoulder  Pain Orientation 1: Left  Post Session:  3/10   Medications Last Reviewed:  2022  Updated Objective Findings:  Patient has pain at flexion 145 degrees. TREATMENT:   THERAPEUTIC EXERCISE: (40 minutes):  Exercises per grid below to improve mobility, strength, balance and coordination. Required minimal visual, verbal, manual and tactile cues to promote proper body alignment, promote proper body posture, promote proper body mechanics and promote proper body breathing techniques. Progressed resistance, range, repetitions and complexity of movement as indicated.      Date:  2022 Date:  2022 Date:  2022   Activity/Exercise Parameters Parameters Parameters   Table Walkout x10 holding 5 sec 15 x 5 sec 15 x 5 sec   Physioball Rollout  x20 holding 5 sec x20 holding 5 sec x20 holding 5 sec   External Rotation Seated green  X 10 1/2 roll  Supine 2x10 green band     Standing green  X 10 1/2 roll 3x15 with blue  band supine    3x10 with roller and blue band standing    Mid Row Standing blue 3 x 15 Standing blue 3 x 15 3x15 black band   Pulleys 5 minutes 5 minutes scaption 5 minutes scaption   Low row Blue  3 X 10 Blue 3 x 15 Blue  3x15   Scapular retraction 2 x 10 1/2 roll 1 x 10  1/2 roll 2 x 10 1 x 10  1/2 roll 2 x 10   Scaption  1 x 10 stopped due to pain Active x 10 Active 2x10   Weighted Carry 3 x 50 ft 15 lbs 3 x 50 ft 15 lbs 3x25 feet with 15 pounds suitcase carry    Wall Slide  2x10 with pillow on wall in external rotated position (thumbs up) 2x10 with pillow on wall in external rotated position (thumbs up)   Supine Pullover  3x5 with 5 pounds             Time spent with patient reviewing proper muscle recruitment and technique with exercises. MANUAL THERAPY: (13 minutes): Joint mobilization and Soft tissue mobilization was utilized and necessary because of the patient's restricted joint motion and painful spasm   Soft tissue mobilization to anterior shoulder,lateral arm and teres in sitting   Glenohumeral joint mobilization and gentle distraction in supine    MODALITIES: (0 minutes):      None today     HEP: As above; handouts given to patient for all exercises. Treatment/Session Summary:    · Response to Treatment:  Tolerated exercises well. Intermittent pain with certain movements but overall improving. · Baseline vitals (2/3/2022): Not tested today  · Communication/Consultation:  HEP   · Equipment provided today:  None today  · Recommendations/Intent for next treatment session: Next visit will focus on graded exercise program to improve activity tolerance, range of motion, strength, and decrease pain centered on UE.     Total Treatment Billable Duration:  53 minutes:   PT Patient Time In/Time Out  Time In: 0800  Time Out: Bunny Dolan 6885, PTA

## 2022-02-28 ENCOUNTER — HOSPITAL ENCOUNTER (OUTPATIENT)
Dept: PHYSICAL THERAPY | Age: 77
Discharge: HOME | End: 2022-02-28
Attending: SPECIALIST/TECHNOLOGIST
Payer: MEDICARE

## 2022-02-28 PROCEDURE — 97110 THERAPEUTIC EXERCISES: CPT

## 2022-02-28 PROCEDURE — 97140 MANUAL THERAPY 1/> REGIONS: CPT

## 2022-02-28 NOTE — PROGRESS NOTES
Emani Henry  : 1945  Primary: Kusum Brumfield Medicare Advantage  Secondary:  [address] at [facility]  [address] [place], [address], [place], [address] [place] [address]  [phone]   [phone]       OUTPATIENT PHYSICAL THERAPY:Progress Report 2022    ICD-10: Treatment Diagnosis: Pain in Left Shoulder (M25.512)              Treatment Diagnosis 2: Stiffness of Left Shoulder not elsewhere specified (M25.612)              Treatment Diagnosis 3: Strain of muscle(s) and tendon(s) of the rotator cuff of Left Shoulder, sequela (S46.012S)                   Precautions: None  Allergies: Pravachol [pravastatin]   TREATMENT PLAN:  Effective Dates: 2/3/2022 TO 2022 (60 days). Frequency/Duration: 1 to 2 times a week for 60 Day(s) MEDICAL/REFERRING DIAGNOSIS:  Pain in left shoulder [M25.512]   DATE OF ONSET: Patient reports pain started approximately two months ago in 2021. REFERRING PHYSICIAN: Ariel Grant NP MD Orders: Evaluate and Treat   Return MD Appointment: 3/9/2022     INITIAL ASSESSMENT:  Mr. Steven Soto is a 77 y.o. male presenting to physical therapy with complaints of L shoulder pain. Patient reports pain started approximately two months ago and has progressively gotten worse with no MICHAEL. Patient reports pain is sharp and limits him from raising and lifting his arm. He denies any numbness and tingling at this time. Patient met with orthopedic MD and has received an injection on 2022. Patient reports no relief and no change in symptoms since injection. At this time he currently is having issues performing ADLs as well as performing routine tasks around the house. He also enjoys golf but has been limited secondary to his shoulder pain. He would like to be able to restore function and decrease pain in order to resume golf as well as perform all ADLs and navigate his home and community environment without pain or dysfunction.  Patient presents with increased pain, decreased strength, decreased ROM, decreased flexibility, impaired posture, impaired overhead reach, decreased activity tolerance, and overall impaired functional mobility. Patient is a good candidate for skilled physical therapy interventions to include manual therapy, therapeutic exercise, postural re-education, body mechanics training, and pain modalities as needed to address and improve deficits to progress functional independence. PROGRESS UPDATE (2/28/2022): Patient was seen for 7 sessions of Physical Therapy from 2/3/2022 to 2/28/2022. Patient reports feeling 75% better with pain and dysfunction of L ARM. Patient demonstrates improvement in strength, ROM, and activity tolerance per MMT, goniometer, and standardized outcome measure. Patient still limited in all functional areas impacting ability to perform ADLs. Patient still has most pain and dysfunction between 75-95 degrees of flexion limiting his ability to perform ADLs and work out in his yard. Patient will benefit from continued skilled Physical Therapy in order to address remaining deficits and progress functional independence. PROBLEM LIST (Impacting functional limitations):  1. Decreased Strength  2. Decreased ADL/Functional Activities  3. Increased Pain  4. Decreased Activity Tolerance  5. Decreased Flexibility/Joint Mobility  6. Decreased Gray with Home Exercise Program INTERVENTIONS PLANNED: (Treatment may consist of any combination of the following)  1. Cold  2. Cryotherapy  3. Electrical Stimulation  4. Heat  5. Home Exercise Program (HEP)  6. Manual Therapy  7. Neuromuscular Re-education/Strengthening  8. Range of Motion (ROM)  9. Therapeutic Activites  10. Therapeutic Exercise/Strengthening  11. Transcutaneous Electrical Nerve Stimulation (TENS)   12. Dry Needling     GOALS: (Goals have been discussed and agreed upon with patient.)  Short-Term Functional Goals: Time Frame: 2/3/2022 to 3/3/2022  1.  Patient demonstrates independence with home exercise program without verbal cueing provided by therapist.  (GOAL MET)  2. Patient will report no more than 4/10 L shoulder pain at rest in order to demonstrate improved self pain control and tolerance. (GOAL MET)  3. Patient will be educated in and demonstrate improved upright posture including decreased forward head and increased posterior tilt to scapula to reduce symptoms and improve range of motion of L shoulder. (GOAL MET)  4. Patient will demonstrate improved external rotation to 60 degrees in order to wash the back of his head. (GOAL MET)  Discharge Goals: Time Frame: 2/3/2022 to 4/7/2022  1. Patient will report pain no more than 1/10 L shoulder pain at rest in order to tolerate sleeping at night. (IN PROGRESS)  2. Patient will demonstrate improved strength to 4+/5 bilat UE strength in order to raise and lift 20 pounds while performing routine tasks around his home. (IN PROGRESS)  3. Patient will demonstrate 165 degrees of pain free flexion in order to reach the upper cabinets in his kitchen. (IN PROGRESS)  4. Patient will improve Disability of the Arm, Shoulder, and Hand score to under 15/55 from 26/55. (IN PROGRESS)    Outcome Measure: Tool Used: Disabilities of the Arm, Shoulder and Hand (DASH) Questionnaire - Quick Version  Score:  Initial: 26/55  Most Recent: 21/55 (Date: 2/28/2022 )   Interpretation of Score: The DASH is designed to measure the activities of daily living in person's with upper extremity dysfunction or pain. Each section is scored on a 1-5 scale, 5 representing the greatest disability. The scores of each section are added together for a total score of 55. UPDATED OBJECTIVE DATA:    Patient denies any headaches, changes in vision, dizziness, vertigo, nausea, drop attacks, black outs, tinnitus, dysphagia, dysarthria, LE symptoms or bowel/bladder dysfunction.     Observation/Orthostatic Postural Assessment:          Patient posture improving but still has slight forward head but more upright with increased posterior tilt of scapula (form patient sits and stands with slight forward head and anterior tilt to scapula)   Palpation:          Patient tender to palpation diffusely through L shoulder and L cervical region. ROM:          NT: not tested        WFL: within functional limits   AROM/ PROM Left (degrees) Right (degrees)   Shoulder Flexion 155 with pain between 75-95 (from 90) 165   Shoulder Abduction 140 (from 85) 160   Shoulder Internal Rotation   58 (from 35) 70   Functional Internal Rotation L5 (from Greater trochanter) L 1   Shoulder External Rotation 65 (from 20) 88   Functional External Rotation C2 (from Occiput) C7     Strength: Motion Tested Left   (*/5) Right  (*/5)   Shoulder Flexion 4- (from 2+) 4+   Scaption 4- (from 2+) 4+   Shoulder Abduction 3+ (from 2+) 4+   Shoulder Internal Rotation  3+ (from 3-) 4+   Shoulder External Rotation 3+ (from 3-) 4   Shoulder Internal Rotation   (90 abduction) 3+ (from NT secondary to irritability) 4   Shoulder External Rotation   (90 abduction) 3+ (from NT secondary to irritability) 4-   Elbow Flexion 4+ (From 4-) 5   Elbow Extension 4+ (From 4-) 5   Wrist Flexion 4+ (from 4) 5   Wrist Extension 4+ (from 4) 5    (in lbs): arm at side with elbow flexed to 90 degrees 28 kg 30 kg      Special Tests:     Impingement tests:  Reza-Ringsted test: Positive L, Negative R  Neer test: Positive L, Negative R  Painful arc:  Positive L, Negative R  Rotator Cuff:  Lift off test: Positive L, Negative R  Drop sign: Negative Bilat  IR Lag test: Negative Bilat  ER Lag test:   Positive L, Negative R    Passive Accessory Motion:         Decreased and painful motion of L UE  Neurological Screen:              Myotomes: Key muscle strength testing through bilateral UE is [facility]. Dermatomes: Sensation to light touch for bilateral UE is intact to light touch.    Reflexes:          Biceps (C5): 2+         Brachioradialis (C6): 2+        Triceps (C7): 2+    Functional Mobility:         Overhead reach: painful on L UE  Balance:          NT secondary to nature of visit           Medical Necessity:   · Patient is expected to demonstrate progress in strength, range of motion, balance, coordination and functional technique to improve ability to perform ADLs as well as navigate home and community environment with ability to raise and lift bilat UE overhead. · Skilled intervention continues to be required due to decreased activity tolerance, decreased range of motion, decreased strength, and increased pain. Reason for Services/Other Comments:  · Patient continues to require skilled intervention due to increasing complexity of exercises within graded exercise program.      Rehabilitation Potential For Stated Goals: Good  Regarding Alex Patel's therapy, I certify that the treatment plan above will be carried out by a therapist or under their direction. Thank you for this referral,  Yang Penny PT     Referring Physician Signature: Jeremias Vo NP No Signature is Required for this note.

## 2022-02-28 NOTE — PROGRESS NOTES
Kathleen Smart  : 1945  Primary: Ama Schmidt Medicare Advantage  Secondary:  [address] at [facility]  [address], [address] [place] [address], [place], [address] [place] [address]  [phone]   [id number] [id number]      OUTPATIENT PHYSICAL THERAPY: Daily Treatment Note 2022    ICD-10: Treatment Diagnosis: Pain in Left Shoulder (M25.512)              Treatment Diagnosis 2: Stiffness of Left Shoulder not elsewhere specified (M25.612)              Treatment Diagnosis 3: Strain of muscle(s) and tendon(s) of the rotator cuff of Left Shoulder, sequela (S46.012S)                   Precautions: None  Allergies: Pravachol [pravastatin]   TREATMENT PLAN:  Effective Dates: 2/3/2022 TO 2022 (60 days). Frequency/Duration: 1 to 2 times a week for 60 Day(s) MEDICAL/REFERRING DIAGNOSIS:  Left shoulder pain, unspecified chronicity [M25.512]   DATE OF ONSET: Patient reports pain started approximately two months ago in 2021. REFERRING PHYSICIAN: Sanya Kent NP MD Orders: Evaluate and Treat   Return MD Appointment: 3/9/2022     Pre-treatment Symptoms/Complaints:  Patient feels like his shoulder continues to improve with therapy. Pain: Initial: Pain Intensity 1: 4  Pain Location 1: Shoulder  Pain Orientation 1: Left  Post Session:  3/10   Medications Last Reviewed:  2022  Updated Objective Findings:  See Progress Note. TREATMENT:   THERAPEUTIC EXERCISE: (42 minutes):  Exercises per grid below to improve mobility, strength, balance and coordination. Required minimal visual, verbal, manual and tactile cues to promote proper body alignment, promote proper body posture, promote proper body mechanics and promote proper body breathing techniques. Progressed resistance, range, repetitions and complexity of movement as indicated.      Date:  2022 Date:  2022 Date:  2022   Activity/Exercise Parameters Parameters Parameters   Table Walkout x10 holding 5 sec 15 x 5 sec 15 x 5 sec Physioball Rollout   x20 holding 5 sec x20 holding 5 sec   External Rotation 2x10 walkout with red band     2x10 standing with green with roller      Supine 2x10 green band     Standing green  X 10 1/2 roll 3x15 with blue  band supine    3x10 with roller and blue band standing    Mid Row Standing black 3 x 15 Standing blue 3 x 15 3x15 black band   Pulleys  5 minutes scaption 5 minutes scaption   Low row Blue  3 X 10 Blue 3 x 15 Blue  3x15   Scapular retraction 2 x 10 1/2 roll 1 x 10  1/2 roll 2 x 10 1 x 10  1/2 roll 2 x 10   Scaption  2x10 active Active x 10 Active 2x10   Weighted Carry 3 x 50 ft 15 lbs 3 x 50 ft 15 lbs 3x25 feet with 15 pounds suitcase carry    Wall Slide 3x10 wall Y 2x10 with pillow on wall in external rotated position (thumbs up) 2x10 with pillow on wall in external rotated position (thumbs up)   Supine Pullover 3x10 with 5 pounds  3x5 with 5 pounds     Supine Punch 2x10 stopped secondary to pain with 5 pound     Plinth Slide --next session       Time spent with patient reviewing proper muscle recruitment and technique with exercises. MANUAL THERAPY: (12 minutes): Joint mobilization and Soft tissue mobilization was utilized and necessary because of the patient's restricted joint motion and painful spasm   Soft tissue mobilization to anterior shoulder,lateral arm and teres in sitting   Glenohumeral joint mobilization and gentle distraction in supine    MODALITIES: (0 minutes):      None today     HEP: As above; handouts given to patient for all exercises. Treatment/Session Summary:    · Response to Treatment:  Patient has pain with active flexion/scapular exercises starting at 75 degrees flexion and decreasing around 95 degrees. Performed active scapular raise to tolerance as well as attempted supine punch but stopped secondary to pain. Patient progressing with tolerance of posterior cuff and periscapular exercises. · Baseline vitals (2/3/2022):  Not tested today  · Communication/Consultation:  HEP   · Equipment provided today:  None today  · Recommendations/Intent for next treatment session: Next visit will focus on graded exercise program to improve activity tolerance, range of motion, strength, and decrease pain centered on UE.     Total Treatment Billable Duration:  54 minutes:   PT Patient Time In/Time Out  Time In: 0802  Time Out: 0900  Arnold Goodwin, PT

## 2022-03-02 ENCOUNTER — HOSPITAL ENCOUNTER (OUTPATIENT)
Dept: PHYSICAL THERAPY | Age: 77
Discharge: HOME | End: 2022-03-02
Attending: SPECIALIST/TECHNOLOGIST
Payer: MEDICARE

## 2022-03-02 PROBLEM — Z94.84 HX OF STEM CELL TRANSPLANT (HCC): Status: ACTIVE | Noted: 2022-03-02

## 2022-03-02 NOTE — PROGRESS NOTES
Patient did not show. Called patient and he states he canceled this appointment at last therapy session.                   Dashawn Holloway, PTA

## 2022-03-07 ENCOUNTER — HOSPITAL ENCOUNTER (OUTPATIENT)
Dept: PHYSICAL THERAPY | Age: 77
Discharge: HOME | End: 2022-03-07
Attending: SPECIALIST/TECHNOLOGIST
Payer: MEDICARE

## 2022-03-07 PROCEDURE — 97140 MANUAL THERAPY 1/> REGIONS: CPT

## 2022-03-07 PROCEDURE — 97110 THERAPEUTIC EXERCISES: CPT

## 2022-03-07 NOTE — PROGRESS NOTES
Lula Carroll  : 1945  Primary: Marivel Iqbal Medicare Advantage  Secondary:  [address] at [facility]  [address] [place], [address] [place] [address], [place], [address] [place] [address]  [phone]   [phone]      OUTPATIENT PHYSICAL THERAPY: Daily Treatment Note 3/7/2022    ICD-10: Treatment Diagnosis: Pain in Left Shoulder (M25.512)              Treatment Diagnosis 2: Stiffness of Left Shoulder not elsewhere specified (M25.612)              Treatment Diagnosis 3: Strain of muscle(s) and tendon(s) of the rotator cuff of Left Shoulder, sequela (S46.012S)                   Precautions: None  Allergies: Pravachol [pravastatin]   TREATMENT PLAN:  Effective Dates: 2/3/2022 TO 2022 (60 days). Frequency/Duration: 1 to 2 times a week for 60 Day(s) MEDICAL/REFERRING DIAGNOSIS:  Left shoulder pain, unspecified chronicity [M25.512]   DATE OF ONSET: Patient reports pain started approximately two months ago in 2021. REFERRING PHYSICIAN: Demetrio Rick NP MD Orders: Evaluate and Treat   Return MD Appointment: 3/9/2022     Pre-treatment Symptoms/Complaints:  Patient has a dull ache in his shoulder today. Pain: Initial: Pain Intensity 1: 4  Pain Location 1: Shoulder  Pain Orientation 1: Left  Post Session:  3/10   Medications Last Reviewed:  3/7/2022  Updated Objective Findings:  Tenderness through L upper trap and near infraspinatus attachment    TREATMENT:   THERAPEUTIC EXERCISE: (44 minutes):  Exercises per grid below to improve mobility, strength, balance and coordination. Required minimal visual, verbal, manual and tactile cues to promote proper body alignment, promote proper body posture, promote proper body mechanics and promote proper body breathing techniques. Progressed resistance, range, repetitions and complexity of movement as indicated.      Date:  2022 Date:  3/7/2022 Date:  2022   Activity/Exercise Parameters Parameters Parameters   UE Ergometer  5 min level 2.5 Table Walkout x10 holding 5 sec 20 x 5 sec 15 x 5 sec   Physioball Rollout    x20 holding 5 sec   External Rotation 2x10 walkout with red band     2x10 standing with green with roller      Supine 2x10 green band     Seated 2x10 red band  3x15 with blue  band supine    3x10 with roller and blue band standing    Mid Row Standing black 3 x 15 Standing blue 3 x 15 3x15 black band   Pulleys  5 minutes scaption 5 minutes scaption   Low row Blue  3 X 10 Blue 3 x 15 Blue  3x15   Scapular retraction 2 x 10 1/2 roll 1 x 10  1/2 roll 2 x 10 1 x 10  1/2 roll 2 x 10   Scaption  2x10 active Active x 10 Active 2x10   Weighted Carry 3 x 50 ft 15 lbs 3 x 50 ft 15 lbs 3x25 feet with 15 pounds suitcase carry    Wall Slide 3x10 wall Y 2x10 with pillow on wall in external rotated position (thumbs up) 2x10 with pillow on wall in external rotated position (thumbs up)   Supine Pullover 3x10 with 5 pounds  3x0 with 5 pounds     Supine Punch 2x10 stopped secondary to pain with 5 pound 2x10 with 5 pounds    Plinth Slide --next session 3x10 with 3 pound weight       Time spent with patient reviewing proper muscle recruitment and technique with exercises. MANUAL THERAPY: (12 minutes): Joint mobilization and Soft tissue mobilization was utilized and necessary because of the patient's restricted joint motion and painful spasm   Soft tissue mobilization to anterior shoulder,lateral arm and teres in sitting   Glenohumeral joint mobilization and gentle distraction in supine    MODALITIES: (0 minutes):      None today     HEP: As above; handouts given to patient for all exercises. Treatment/Session Summary:    · Response to Treatment:  Continued to emphasize periscapular strength as well as gradual progression of AROM with resistance per plinth slide and wall Y. Patient tolerated session well. · Baseline vitals (2/3/2022):  Not tested today  · Communication/Consultation:  HEP   · Equipment provided today:  None today  · Recommendations/Intent for next treatment session: Next visit will focus on graded exercise program to improve activity tolerance, range of motion, strength, and decrease pain centered on UE.     Total Treatment Billable Duration:  56 minutes:   PT Patient Time In/Time Out  Time In: 1000  Time Out: 5427 Ashly Mcdaniels PT

## 2022-03-10 ENCOUNTER — HOSPITAL ENCOUNTER (OUTPATIENT)
Dept: PHYSICAL THERAPY | Age: 77
Discharge: HOME | End: 2022-03-10
Attending: SPECIALIST/TECHNOLOGIST
Payer: MEDICARE

## 2022-03-10 PROCEDURE — 97140 MANUAL THERAPY 1/> REGIONS: CPT

## 2022-03-10 PROCEDURE — 97110 THERAPEUTIC EXERCISES: CPT

## 2022-03-10 NOTE — PROGRESS NOTES
Mar Reddy  : 1945  Primary: Lynette Greenwood Medicare Advantage  Secondary:  [address] at [facility]  [address] [place], [address] [place] [address], [place], [address] [place] [address]  [phone]   [phone]      OUTPATIENT PHYSICAL THERAPY: Daily Treatment Note 3/10/2022    ICD-10: Treatment Diagnosis: Pain in Left Shoulder (M25.512)              Treatment Diagnosis 2: Stiffness of Left Shoulder not elsewhere specified (M25.612)              Treatment Diagnosis 3: Strain of muscle(s) and tendon(s) of the rotator cuff of Left Shoulder, sequela (S46.012S)                   Precautions: None  Allergies: Pravachol [pravastatin]   TREATMENT PLAN:  Effective Dates: 2/3/2022 TO 2022 (60 days). Frequency/Duration: 1 to 2 times a week for 60 Day(s) MEDICAL/REFERRING DIAGNOSIS:  Left shoulder pain, unspecified chronicity [M25.512]   DATE OF ONSET: Patient reports pain started approximately two months ago in 2021. REFERRING PHYSICIAN: Abraham De Souza NP MD Orders: Evaluate and Treat   Return MD Appointment: 3/9/2022     Pre-treatment Symptoms/Complaints:  Patient states he has an MRI scheduled this week and is going to cancel all remaining appointments. Pain: Initial: Pain Intensity 1: 2  Pain Location 1: Shoulder  Pain Orientation 1: Left  Post Session:  2/10   Medications Last Reviewed:  3/10/2022  Updated Objective Findings:  Tenderness through L upper trap and near infraspinatus attachment    TREATMENT:   THERAPEUTIC EXERCISE: (30 minutes):  Exercises per grid below to improve mobility, strength, balance and coordination. Required minimal visual, verbal, manual and tactile cues to promote proper body alignment, promote proper body posture, promote proper body mechanics and promote proper body breathing techniques. Progressed resistance, range, repetitions and complexity of movement as indicated.      Date:  2022 Date:  3/7/2022 Date:  3/10/22   Activity/Exercise Parameters Parameters Parameters   UE Ergometer  5 min level 2.5  3 min FWD  3 min reverse    Table Walkout x10 holding 5 sec 20 x 5 sec 15 x 5 sec   Physioball Rollout       External Rotation 2x10 walkout with red band     2x10 standing with green with roller      Supine 2x10 green band     Seated 2x10 red band  3x15 with blue  band supine       Mid Row Standing black 3 x 15 Standing blue 3 x 15 3x15 black band   Pulleys  5 minutes scaption  scaption   Low row Blue  3 X 10 Blue 3 x 15 Blue  3x15   Scapular retraction 2 x 10 1/2 roll 1 x 10  1/2 roll 2 x 10    Scaption  2x10 active Active x 10 Active 2x10   Weighted Carry 3 x 50 ft 15 lbs 3 x 50 ft 15 lbs 3x25 feet with 15 pounds suitcase carry    Wall Slide 3x10 wall Y 2x10 with pillow on wall in external rotated position (thumbs up) 2x10 with pillow on wall in external rotated position (thumbs up)   Supine Pullover 3x10 with 5 pounds  3x0 with 5 pounds  5 lbs 3 x 10   Supine Punch 2x10 stopped secondary to pain with 5 pound 2x10 with 5 pounds 5 lbs 3 x 10   Plinth Slide --next session 3x10 with 3 pound weight       Time spent with patient reviewing proper muscle recruitment and technique with exercises. MANUAL THERAPY: (23 minutes): Joint mobilization and Soft tissue mobilization was utilized and necessary because of the patient's restricted joint motion and painful spasm   Soft tissue mobilization to anterior shoulder,lateral arm and teres in sitting   Glenohumeral joint mobilization and gentle distraction in supine    MODALITIES: (0 minutes):      None today     HEP: As above; handouts given to patient for all exercises. Treatment/Session Summary:    · Response to Treatment:  Improved exercise technique and ROM . · Baseline vitals (2/3/2022):  Not tested today  · Communication/Consultation:  HEP   · Equipment provided today:  None today  · Recommendations/Intent for next treatment session: Discharge per pt request    Total Treatment Billable Duration:  53 minutes:   PT Patient Time In/Time Out  Time In: 2918  Time Out: [address] [place], [place]

## 2022-03-14 ENCOUNTER — APPOINTMENT (OUTPATIENT)
Dept: PHYSICAL THERAPY | Age: 77
End: 2022-03-14
Attending: SPECIALIST/TECHNOLOGIST
Payer: MEDICARE

## 2022-03-14 NOTE — THERAPY DISCHARGE
Edmond Chirinos  : 1945  Primary: Teodoro Baker Medicare Advantage  Secondary:  [address] at [facility]  [address] [place], [address] [place] [address], [facility], [address] [place] [address] Street  [phone]   [phone]       OUTPATIENT PHYSICAL [address] [place] [address] 3/14/2022    ICD-10: Treatment Diagnosis: Pain in Left Shoulder (M25.512)              Treatment Diagnosis 2: Stiffness of Left Shoulder not elsewhere specified (M25.612)              Treatment Diagnosis 3: Strain of muscle(s) and tendon(s) of the rotator cuff of Left Shoulder, sequela (S46.012S)                   Precautions: None  Allergies: Pravachol [pravastatin]   TREATMENT PLAN:  Effective Dates: 2/3/2022 TO 2022 (60 days). Frequency/Duration: 1 to 2 times a week for 60 Day(s) MEDICAL/REFERRING DIAGNOSIS:  Pain in left shoulder [M25.512]   DATE OF ONSET: Patient reports pain started approximately two months ago in 2021. REFERRING PHYSICIAN: Cal Yañez NP MD Orders: Evaluate and Treat   Return MD Appointment: 3/9/2022     INITIAL ASSESSMENT:  Mr. Lizett Banks is a 77 y.o. male presenting to physical therapy with complaints of L shoulder pain. Patient reports pain started approximately two months ago and has progressively gotten worse with no MICHAEL. Patient reports pain is sharp and limits him from raising and lifting his arm. He denies any numbness and tingling at this time. Patient met with orthopedic MD and has received an injection on 2022. Patient reports no relief and no change in symptoms since injection. At this time he currently is having issues performing ADLs as well as performing routine tasks around the house. He also enjoys golf but has been limited secondary to his shoulder pain. He would like to be able to restore function and decrease pain in order to resume golf as well as perform all ADLs and navigate his home and community environment without pain or dysfunction.  Patient presents with increased pain, decreased strength, decreased ROM, decreased flexibility, impaired posture, impaired overhead reach, decreased activity tolerance, and overall impaired functional mobility. Patient is a good candidate for skilled physical therapy interventions to include manual therapy, therapeutic exercise, postural re-education, body mechanics training, and pain modalities as needed to address and improve deficits to progress functional independence. PROGRESS UPDATE (3/28/2022): Patient was seen for 9 sessions of Physical Therapy from 2/3/2022 to 3/10/2022. Patient reports feeling 80-85% better with pain and dysfunction of L ARM. Patient demonstrates improvement in strength, ROM, and activity tolerance per MMT, goniometer, and standardized outcome measure. Patient is now independent with HEP and feels as though he can continue to progress on his own. Patient is being discharged secondary to all goals being met and patient being independent with HEP. PROBLEM LIST (Impacting functional limitations):  1. Decreased Strength  2. Decreased ADL/Functional Activities  3. Increased Pain  4. Decreased Activity Tolerance  5. Decreased Flexibility/Joint Mobility  6. Decreased Floyd with Home Exercise Program INTERVENTIONS PLANNED: (Treatment may consist of any combination of the following)  1. Cold  2. Cryotherapy  3. Electrical Stimulation  4. Heat  5. Home Exercise Program (HEP)  6. Manual Therapy  7. Neuromuscular Re-education/Strengthening  8. Range of Motion (ROM)  9. Therapeutic Activites  10. Therapeutic Exercise/Strengthening  11. Transcutaneous Electrical Nerve Stimulation (TENS)   12. Dry Needling     GOALS: (Goals have been discussed and agreed upon with patient.)  Short-Term Functional Goals: Time Frame: 2/3/2022 to 3/3/2022  1. Patient demonstrates independence with home exercise program without verbal cueing provided by therapist.  (GOAL MET)  2.  Patient will report no more than 4/10 L shoulder pain at rest in order to demonstrate improved self pain control and tolerance. (GOAL MET)  3. Patient will be educated in and demonstrate improved upright posture including decreased forward head and increased posterior tilt to scapula to reduce symptoms and improve range of motion of L shoulder. (GOAL MET)  4. Patient will demonstrate improved external rotation to 60 degrees in order to wash the back of his head. (GOAL MET)  Discharge Goals: Time Frame: 2/3/2022 to 4/7/2022  1. Patient will report pain no more than 1/10 L shoulder pain at rest in order to tolerate sleeping at night. (GOAL MET)  2. Patient will demonstrate improved strength to 4+/5 bilat UE strength in order to raise and lift 20 pounds while performing routine tasks around his home. (GOAL MET)  3. Patient will demonstrate 165 degrees of pain free flexion in order to reach the upper cabinets in his kitchen. (GOAL MET)  4. Patient will improve Disability of the Arm, Shoulder, and Hand score to under 15/55 from 26/55. (GOAL MET)    Outcome Measure: Tool Used: Disabilities of the Arm, Shoulder and Hand (DASH) Questionnaire - Quick Version  Score:  Initial: 26/55  Most Recent: 15/55 (Date: 3/10/2022 )   Interpretation of Score: The DASH is designed to measure the activities of daily living in person's with upper extremity dysfunction or pain. Each section is scored on a 1-5 scale, 5 representing the greatest disability. The scores of each section are added together for a total score of 55. UPDATED OBJECTIVE DATA:    Patient denies any headaches, changes in vision, dizziness, vertigo, nausea, drop attacks, black outs, tinnitus, dysphagia, dysarthria, LE symptoms or bowel/bladder dysfunction.     Observation/Orthostatic Postural Assessment:          Patient posture improving but still has slight forward head but more upright with increased posterior tilt of scapula (form patient sits and stands with slight forward head and anterior tilt to scapula)   Palpation: Patient tender to palpation diffusely through L shoulder and L cervical region. ROM:          NT: not tested        WFL: within functional limits   AROM/ PROM Left (degrees) Right (degrees)   Shoulder Flexion 160 with pain between 75-95 (from 90) 165   Shoulder Abduction 155 (from 85) 160   Shoulder Internal Rotation   60 (from 35) 70   Functional Internal Rotation L2 (from Greater trochanter) L 1   Shoulder External Rotation 80 (from 20) 88   Functional External Rotation C5 (from Occiput) C7     Strength: Motion Tested Left   (*/5) Right  (*/5)   Shoulder Flexion 4+ (from 2+) 4+   Scaption 4+ (from 2+) 4+   Shoulder Abduction 4+ (from 2+) 4+   Shoulder Internal Rotation  4+ (from 3-) 4+   Shoulder External Rotation 4+ (from 3-) 4+ (from 4)   Shoulder Internal Rotation   (90 abduction) 4+ (from NT secondary to irritability) 4+ (from 4)   Shoulder External Rotation   (90 abduction) 4+ (from NT secondary to irritability) 4+ (from 4-)   Elbow Flexion 4+ (From 4-) 5   Elbow Extension 4+ (From 4-) 5   Wrist Flexion 4+ (from 4) 5   Wrist Extension 4+ (from 4) 5    (in lbs): arm at side with elbow flexed to 90 degrees 28 kg 30 kg      Special Tests:     Impingement tests:  Reza-Jani test: Negative bilat (from Positive L, Negative R)  Neer test: Positive L, Negative R  Painful arc:  Positive L, Negative R  Rotator Cuff:  Lift off test: Negative bilat (from Positive L, Negative R)  Drop sign: Negative Bilat  IR Lag test: Negative Bilat  ER Lag test:   Negative bilat (from Positive L, Negative R)    Passive Accessory Motion:         Unremarkable from (Decreased and painful motion of L UE)  Neurological Screen:              Myotomes: Key muscle strength testing through bilateral UE is [facility]. Dermatomes: Sensation to light touch for bilateral UE is intact to light touch.    Reflexes:          Biceps (C5): 2+         Brachioradialis (C6): 2+        Triceps (C7): 2+    Functional Mobility: Overhead reach: painful on L UE  Balance:          NT secondary to nature of visit           Medical Necessity:   · Patient is being discharged at this time with HEP secondary to all functional goals being met. Patient able to demonstrate and verbalize understanding of HEP at time of discharge. Rehabilitation Potential For Stated Goals: Good  Regarding Alex Patel's therapy, I certify that the treatment plan above will be carried out by a therapist or under their direction. Thank you for this referral,  Dilip Reyes PT     Referring Physician Signature: Elise Love NP No Signature is Required for this note.

## 2022-03-16 ENCOUNTER — APPOINTMENT (OUTPATIENT)
Dept: PHYSICAL THERAPY | Age: 77
End: 2022-03-16
Attending: SPECIALIST/TECHNOLOGIST
Payer: MEDICARE

## 2022-03-18 PROBLEM — Z94.84 HX OF STEM CELL TRANSPLANT (HCC): Status: ACTIVE | Noted: 2022-03-02

## 2022-03-19 PROBLEM — N40.1 BPH WITH OBSTRUCTION/LOWER URINARY TRACT SYMPTOMS: Status: ACTIVE | Noted: 2021-08-23

## 2022-03-19 PROBLEM — N13.8 BPH WITH OBSTRUCTION/LOWER URINARY TRACT SYMPTOMS: Status: ACTIVE | Noted: 2021-08-23

## 2022-03-19 PROBLEM — L72.3 SEBACEOUS CYST: Status: ACTIVE | Noted: 2018-11-01

## 2022-03-21 ENCOUNTER — APPOINTMENT (OUTPATIENT)
Dept: PHYSICAL THERAPY | Age: 77
End: 2022-03-21
Attending: SPECIALIST/TECHNOLOGIST
Payer: MEDICARE

## 2022-03-22 PROBLEM — M75.112 NONTRAUMATIC INCOMPLETE TEAR OF LEFT ROTATOR CUFF: Status: ACTIVE | Noted: 2022-03-22

## 2022-03-22 PROBLEM — M75.22 BICEPS TENDINITIS OF LEFT SHOULDER: Status: ACTIVE | Noted: 2022-03-22

## 2022-03-23 ENCOUNTER — APPOINTMENT (OUTPATIENT)
Dept: PHYSICAL THERAPY | Age: 77
End: 2022-03-23
Attending: SPECIALIST/TECHNOLOGIST
Payer: MEDICARE

## 2022-03-24 PROBLEM — M75.112 NONTRAUMATIC INCOMPLETE TEAR OF LEFT ROTATOR CUFF: Status: ACTIVE | Noted: 2022-03-22

## 2022-03-24 PROBLEM — M75.22 BICEPS TENDINITIS OF LEFT SHOULDER: Status: ACTIVE | Noted: 2022-03-22

## 2022-03-28 ENCOUNTER — APPOINTMENT (OUTPATIENT)
Dept: PHYSICAL THERAPY | Age: 77
End: 2022-03-28
Attending: SPECIALIST/TECHNOLOGIST
Payer: MEDICARE

## 2022-03-30 ENCOUNTER — APPOINTMENT (OUTPATIENT)
Dept: PHYSICAL THERAPY | Age: 77
End: 2022-03-30
Attending: SPECIALIST/TECHNOLOGIST
Payer: MEDICARE

## 2022-06-28 ENCOUNTER — OFFICE VISIT (OUTPATIENT)
Dept: ORTHOPEDIC SURGERY | Age: 77
End: 2022-06-28
Payer: MEDICARE

## 2022-06-28 DIAGNOSIS — M75.112 NONTRAUMATIC INCOMPLETE TEAR OF LEFT ROTATOR CUFF: Primary | ICD-10-CM

## 2022-06-28 DIAGNOSIS — M75.42 IMPINGEMENT SYNDROME OF LEFT SHOULDER: ICD-10-CM

## 2022-06-28 DIAGNOSIS — M75.22 BICEPS TENDINITIS OF LEFT SHOULDER: ICD-10-CM

## 2022-06-28 PROCEDURE — 99024 POSTOP FOLLOW-UP VISIT: CPT | Performed by: ORTHOPAEDIC SURGERY

## 2022-06-28 PROCEDURE — L3670 SO ACRO/CLAV CAN WEB PRE OTS: HCPCS | Performed by: ORTHOPAEDIC SURGERY

## 2022-06-28 RX ORDER — OXYCODONE HYDROCHLORIDE 5 MG/1
5-10 TABLET ORAL EVERY 4 HOURS PRN
Qty: 40 TABLET | Refills: 0 | Status: SHIPPED | OUTPATIENT
Start: 2022-06-28 | End: 2022-07-03

## 2022-06-28 NOTE — LETTER
DME Patient Authorization Form    Name: Norma Wren  : 1945  MRN: [id number]   Age: 77 y.o. Gender: male  Delivery Address: [facility]     Diagnosis:     ICD-10-CM    1. Nontraumatic incomplete tear of left rotator cuff  M75.112    2. Biceps tendinitis of left shoulder  M75.22    3. Impingement syndrome of left shoulder  M75.42         Requested DME:  Ultrasling () x 1 - left        Clinical Notes:     **Indicates non-covered items by insurance. Payment expected on date of service. Electronically signed by  Provider: Dr. Dolores Fuentes  Date: 2022                             [facility] Tax ID # [id number]        Durable Medical Equipment and/or Orthotics Patient Consent     I understand that my physician has prescribed this medical supply as part of my treatment plan as a matter of Medical Necessity.  I understand that I have a choice in where I receive my prescribed orthopedic supplies and/or services.  I authorize [facility] to furnish this service/product and to provide my insurance carrier with any information requested in order to process for payment.  I instruct my insurance carrier to pay [facility] directly for these services/products.  I understand that my insurance carrier may deny payment for this supply because it is a non-covered item, deemed not medically necessary or considered experimental.   I understand that any cost not covered by my insurance carrier will be solely my financial responsibility.  I have received the Supplier Standards and have reviewed them.  I have received the prescribed item and have been fully instructed on the proper use of the above services/products.    ______ (Patient Initials) I understand that all DME items are non-returnable after being dispensed.  Items still in sealed packaging may be returned up to 14 days after purchasing. [address] will replace items that are defective.    ______ (Patient Initials) I understand that Ramone Munroe will not file a claim with my insurance carrier for this service/product and I am waiving my right to file a claim on my own for this service/product with my insurance company as this item is NON-COVERED (Denoted by the **) by my Insurance company/policy. ______ (Patient Initials) I understand that I am responsible to bring my equipment to the hospital for any surgery. ______________________________________________  ________________________  Patient / Christa Morales            Thank you for considering [address] Your physician has prescribed specific medical equipment or devices for your home use. The following describes your rights and responsibilities as our customer. Right to Choose Providers: You have a choice regarding which company supplies your home medical equipment and devices, and to consult your physician in this decision. You may choose a medical supply store, a home medical equipment provider, or a specialist such as [facility]. [facility] will coordinate with your physician to provide the medical equipment or devices prescribed for your home use. Right to Service:  You have the right to considerate, respectful and nondiscriminatory care. You have the right to receive accurate and easily understood information about your health care. If you speak a foreign language, or don't understand the discussions, assistance will be provided to allow you to make informed health care decisions. You have the right to know your treatment options and to participate in decisions about your care, including the right to accept or refuse treatment.   You have the right to expect a reasonable response to your requests for treatment or service. You have the right to talk in confidence with health care providers and to have your health care information protected. You have the right to receive an explanation of your bill. You have the right to complain about the service or product you receive. Patient Responsibilities:  Please provide complete and accurate information about your health insurance benefits and make arrangements for the timely payment of your bill. [facility] will, if possible, assume responsibility for billing your insurance (Medicare, Medicaid and commercial) for the prescribed equipment or devices. If your policy does not cover the prescribed product, or only covers a portion of the bill, you are responsible for any remaining balance. Return and Exchange Policy:  [facility] will honor published  Warranties for products. [facility] will accept returns or exchanges within 14 days from the date of receipt, providin) the product must be in new condition; 2) receipt as required; and 3) used disposable and hygiene products may only be returned due to a defective product. Note: Refunds will be issued in a timely manner, please allow 4-6 weeks for processing. Complaint Procedures and DME Consumer Protection Resources:  [facility] values you as a customer, and is committed to resolving patient concerns. This commitment includes understanding and documenting your concerns, conducting a review of internal procedures, and providing you with an explanation and resolution to your concerns. Should you have any questions about our services or billing process, please contact our office at (practice phone number). If we are unable to resolve the concern, you have the right to direct comments to the office of Consumer Protection, in the [address] or the [facility] office, without fear of repercussion.     DMEPOS SUPPLIER STANDARDS    A supplier must be in compliance with all applicable Federal and State licensure and regulatory requirements. A supplier must provide complete and accurate information on the DMEPOS supplier application. Any changes to this information must be reported to the [facility] within 30 days. An authorized individual (one whose signature is binding) must sign the application for billing privileges. A supplier must fill orders from its own inventory, or must contract with other companies for the purchase of items necessary to fill the order. A supplier may not contract with any entity that is currently excluded from the Medicare program, any [facility] [place] program, or from any other Federal procurement or Nonprocurement programs. A supplier must advise beneficiaries that they may rent or purchase inexpensive or routinely purchased durable medical equipment, and of the purchase option for capped rental equipment. A supplier must notify beneficiaries of warranty coverage and honor all warranties under applicable State Law, and repair or replace free of charge Medicare covered items that are under warranty. A supplier must maintain a physical facility on an appropriate site. A supplier must permit CMS, or its agents to conduct on-site inspections to ascertain the supplier's compliance with these standards. The supplier location must be accessible to beneficiaries during reasonable business hours, and must maintain a visible sign and posted hours of operation. A supplier must maintain a primary business telephone listed under the name of the business in a [employer] or a toll free number available through directory assistance. The exclusive use of a beeper, answering machine or cell phone is prohibited. A supplier must have comprehensive liability insurance in the amount of at least $300,000 that covers both the supplier's place of business and all customers and employees of the supplier.   If the supplier manufactures its own items, this insurance must also cover product liability and completed operations. A supplier must agree not to initiate telephone contact with beneficiaries, with a few exceptions allowed. This standard prohibits suppliers from calling beneficiaries in order to solicit new business. A supplier is responsible for delivery and must instruct beneficiaries on use of Medicare covered items, and maintain proof of delivery. A supplier must answer questions, and respond to complaints of the beneficiaries, and maintain documentation of such contacts. A supplier must maintain and replace at no charge or repair directly, or through a service contract with another company, Medicare covered items it has rented to beneficiaries. A supplier must accept returns of substandard (less than full quality for the particular item) or unsuitable items (inappropriate for the beneficiary at the time it was fitted and rented or sold) from beneficiaries. A supplier must disclose these supplier standards to each beneficiary to whom it supplies a Medicare-covered item. A supplier must disclose to the government any person having ownership, financial, or control interest in the supplier. A supplier must not convey or reassign a supplier number; i.e., the supplier may not sell or allow another entity to use its Medicare billing number. A supplier must have a complaint resolution protocol established to address beneficiary complaints that relate to these standards. A record of these complaints must be maintained at the physical facility. Complaint records must include: the name, address, telephone number and health insurance claim number of the beneficiary, a summary of the complaint, and any action taken to resolve it. A supplier must agree to furnish CMS any information required by the Medicare statute and implementing regulations.   A supplier of DMEPOS and other items and services must be accredited by a CMS-approved accreditation organization in order to receive and retain a supplier billing number. The accreditation must indicate the specific products and services, for which the supplier is accredited in order for the supplier to receive payment for those specific products and services. A DMEPOS supplier must notify their accreditation organization when a new DMEPOS location is opened. The accreditation organization may accredit the new supplier location for three months after it is operational without requiring a new site visit. All DMEPOS supplier locations, whether owned or subcontracted, must meet the [employer] and be separately accredited in order to bill Medicare. An accredited supplier may be denied enrollment or their enrollment may be revoked, if CMS determines that they are not in compliance with the DMEPOS quality standards. A DMEPOS supplier must disclose upon enrollment all products and services, including the addition of new product lines for which they are seeking accreditation. If a new product line is added after enrollment, the DMEPOS supplier will be responsible for notifying the accrediting body of the new product so that the DMEPOS supplier can be re-surveyed and accredited for these new products. Must meet the surety bond requirements specified in 42 C. F.R. 424.57(c). Implementation date- May 4, 2009. A supplier must obtain oxygen from a state-licensed oxygen supplier. A supplier must maintain ordering and referring documentation consistent with provisions found in 42 C. F.R. 424.516(f). DMEPOS suppliers are prohibited from sharing a practice location with certain other Medicare providers and suppliers. DMEPOS suppliers must remain open to the public for a minimum of 30 hours per week with certain exceptions.

## 2022-06-28 NOTE — H&P (VIEW-ONLY)
Name: Josep Flood  YOB: 1945  Gender: male  MRN: [id number]      CC: Follow-up (L shoulder pre-op)       HPI: Josep Flood is a 77 y.o. male who returns for follow up preoperative appointment on left shoulder pain. He is scheduled for a rotator cuff repair, subacromial decompression, distal clavicle resection, and biceps tenodesis with Dr. Annie Philippe on 07/06/2022. Continued left shoulder pain and would like to proceed with surgery. Current Outpatient Medications:     oxyCODONE (ROXICODONE) 5 MG immediate release tablet, Take 1-2 tablets by mouth every 4 hours as needed for Pain for up to 5 days. Intended supply: 5 days.  Take lowest dose possible to manage pain, Disp: 40 tablet, Rfl: 0    atorvastatin (LIPITOR) 20 MG tablet, TAKE 1 TABLET DAILY, Disp: , Rfl:     baclofen (LIORESAL) 10 MG tablet, Take 10 mg by mouth 3 times daily as needed, Disp: , Rfl:     losartan-hydroCHLOROthiazide (HYZAAR) 100-25 MG per tablet, TAKE 1 TABLET DAILY, Disp: , Rfl:     metoprolol tartrate (LOPRESSOR) 25 MG tablet, TAKE 1 TABLET TWICE A DAY, Disp: , Rfl:     tamsulosin (FLOMAX) 0.4 MG capsule, Take 0.4 mg by mouth daily, Disp: , Rfl:   Allergies   Allergen Reactions    Pravastatin Diarrhea     Past Medical History:   Diagnosis Date    Arthritis     Hypertension     medication    Type II or unspecified type diabetes mellitus without mention of complication, not stated as uncontrolled 3/9/2015     Past Surgical History:   Procedure Laterality Date    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 Daniel Vance      right inguinal hernia repair     Family History   Problem Relation Age of Onset    Dementia Mother     Hypertension Father     Cancer Father         prostate  Heart Disease Brother     Osteoarthritis Mother      Social History     Socioeconomic History    Marital status:      Spouse name: Not on file    Number of children: Not on file    Years of education: Not on file    Highest education level: Not on file   Occupational History    Not on file   Tobacco Use    Smoking status: Former Smoker     Quit date: 3/12/1980     Years since quittin.3    Smokeless tobacco: Never Used   Substance and Sexual Activity    Alcohol use: No    Drug use: Never    Sexual activity: Not on file   Other Topics Concern    Not on file   Social History Narrative    Not on file     Social Determinants of Health     Financial Resource Strain:     Difficulty of Paying Living Expenses: Not on file   Food Insecurity:     Worried About [address] in the Last Year: Not on file    [address] [employer] in the Last Year: Not on file   Transportation Needs:     Lack of Transportation (Medical): Not on file    Lack of Transportation (Non-Medical):  Not on file   Physical Activity:     Days of Exercise per Week: Not on file    Minutes of Exercise per Session: Not on file   Stress:     Feeling of Stress : Not on file   Social Connections:     Frequency of Communication with Friends and Family: Not on file    Frequency of Social Gatherings with Friends and Family: Not on file    Attends Yazdanism Services: Not on file    Active Member of [address] [place] [address] [employer] or Organizations: Not on file    Attends Club or Organization Meetings: Not on file    Marital Status: Not on file   Intimate Partner Violence:     Fear of Current or Ex-Partner: Not on file    Emotionally Abused: Not on file    Physically Abused: Not on file    Sexually Abused: Not on file   Housing Stability:     Unable to Pay for Housing in the Last Year: Not on file    Number of [place] in the Last Year: Not on file    Unstable Housing in the Last Year: Not on file           Physical Examination:  General: no acute distress  Lungs: breathing easily, CTAB  HEENT: NCAT  Abdomen: soft, non-tender  CV: regular rhythm by pulse  Left Shoulder: No obvious deformity of the biceps. No tenderness to palpation. Active forward flexion to 170 degrees with pain in the extreme. External rotation with elbows at side equal bilaterally. External rotation with elbows at side resisted range of motion 5/5 strength. Positive empty can test with 4+/5 strength in the empty can position. Assessment:     ICD-10-CM    1. Nontraumatic incomplete tear of left rotator cuff  M75.112 Ultrasling ()     Ambulatory referral to Physical Therapy     oxyCODONE (ROXICODONE) 5 MG immediate release tablet   2. Biceps tendinitis of left shoulder  M75.22 Ultrasling ()     Ambulatory referral to Physical Therapy   3. Impingement syndrome of left shoulder  M75.42 Ultrasling ()     Ambulatory referral to Physical Therapy       Plan:   Surgical plan is for left shoulder arthroscopy rotator cuff repair subacromial decompression distal clavicle resection and biceps tenotomy versus tenodesis. I explained to the patient the importance of bringing all equipment with him the day of surgery for placement in the OR. I sent him a postoperative pain prescription and instructed him to pick it up in the next 5 days to prevent expiration. We discussed the risks of the procedure including but not limited to infection, bleeding, anesthetic complications postoperative DVT/PE. All of his questions have been answered and they elect to proceed as planned. Hector Chung NP dictating as a scribe for Radha Powell MD      [employer].  Lucita Palumbo MD, [address] [place] [address] and Sports Medicine

## 2022-06-28 NOTE — PROGRESS NOTES
Name: Traci Bhakta  YOB: 1945  Gender: male  MRN: [id number]      CC: Follow-up (L shoulder pre-op)       HPI: Traci Bhakta is a 77 y.o. male who returns for follow up preoperative appointment on left shoulder pain. He is scheduled for a rotator cuff repair, subacromial decompression, distal clavicle resection, and biceps tenodesis with Dr. Taylor Rivera on 07/06/2022. Continued left shoulder pain and would like to proceed with surgery. Current Outpatient Medications:     oxyCODONE (ROXICODONE) 5 MG immediate release tablet, Take 1-2 tablets by mouth every 4 hours as needed for Pain for up to 5 days. Intended supply: 5 days.  Take lowest dose possible to manage pain, Disp: 40 tablet, Rfl: 0    atorvastatin (LIPITOR) 20 MG tablet, TAKE 1 TABLET DAILY, Disp: , Rfl:     baclofen (LIORESAL) 10 MG tablet, Take 10 mg by mouth 3 times daily as needed, Disp: , Rfl:     losartan-hydroCHLOROthiazide (HYZAAR) 100-25 MG per tablet, TAKE 1 TABLET DAILY, Disp: , Rfl:     metoprolol tartrate (LOPRESSOR) 25 MG tablet, TAKE 1 TABLET TWICE A DAY, Disp: , Rfl:     tamsulosin (FLOMAX) 0.4 MG capsule, Take 0.4 mg by mouth daily, Disp: , Rfl:   Allergies   Allergen Reactions    Pravastatin Diarrhea     Past Medical History:   Diagnosis Date    Arthritis     Hypertension     medication    Type II or unspecified type diabetes mellitus without mention of complication, not stated as uncontrolled 3/9/2015     Past Surgical History:   Procedure Laterality Date    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 David Puentes      right inguinal hernia repair     Family History   Problem Relation Age of Onset    Dementia Mother     Hypertension Father     Cancer Father         prostate  Heart Disease Brother     Osteoarthritis Mother      Social History     Socioeconomic History    Marital status:      Spouse name: Not on file    Number of children: Not on file    Years of education: Not on file    Highest education level: Not on file   Occupational History    Not on file   Tobacco Use    Smoking status: Former Smoker     Quit date: 3/12/1980     Years since quittin.3    Smokeless tobacco: Never Used   Substance and Sexual Activity    Alcohol use: No    Drug use: Never    Sexual activity: Not on file   Other Topics Concern    Not on file   Social History Narrative    Not on file     Social Determinants of Health     Financial Resource Strain:     Difficulty of Paying Living Expenses: Not on file   Food Insecurity:     Worried About [address] in the Last Year: Not on file    [address] [employer] in the Last Year: Not on file   Transportation Needs:     Lack of Transportation (Medical): Not on file    Lack of Transportation (Non-Medical):  Not on file   Physical Activity:     Days of Exercise per Week: Not on file    Minutes of Exercise per Session: Not on file   Stress:     Feeling of Stress : Not on file   Social Connections:     Frequency of Communication with Friends and Family: Not on file    Frequency of Social Gatherings with Friends and Family: Not on file    Attends Yazidism Services: Not on file    Active Member of [address] [place] [address] [employer] or Organizations: Not on file    Attends Club or Organization Meetings: Not on file    Marital Status: Not on file   Intimate Partner Violence:     Fear of Current or Ex-Partner: Not on file    Emotionally Abused: Not on file    Physically Abused: Not on file    Sexually Abused: Not on file   Housing Stability:     Unable to Pay for Housing in the Last Year: Not on file    Number of [place] in the Last Year: Not on file    Unstable Housing in the Last Year: Not on file           Physical Examination:  General: no acute distress  Lungs: breathing easily, CTAB  HEENT: NCAT  Abdomen: soft, non-tender  CV: regular rhythm by pulse  Left Shoulder: No obvious deformity of the biceps. No tenderness to palpation. Active forward flexion to 170 degrees with pain in the extreme. External rotation with elbows at side equal bilaterally. External rotation with elbows at side resisted range of motion 5/5 strength. Positive empty can test with 4+/5 strength in the empty can position. Assessment:     ICD-10-CM    1. Nontraumatic incomplete tear of left rotator cuff  M75.112 Ultrasling ()     Ambulatory referral to Physical Therapy     oxyCODONE (ROXICODONE) 5 MG immediate release tablet   2. Biceps tendinitis of left shoulder  M75.22 Ultrasling ()     Ambulatory referral to Physical Therapy   3. Impingement syndrome of left shoulder  M75.42 Ultrasling ()     Ambulatory referral to Physical Therapy       Plan:   Surgical plan is for left shoulder arthroscopy rotator cuff repair subacromial decompression distal clavicle resection and biceps tenotomy versus tenodesis. I explained to the patient the importance of bringing all equipment with him the day of surgery for placement in the OR. I sent him a postoperative pain prescription and instructed him to pick it up in the next 5 days to prevent expiration. We discussed the risks of the procedure including but not limited to infection, bleeding, anesthetic complications postoperative DVT/PE. All of his questions have been answered and they elect to proceed as planned. Maria E Cueva NP dictating as a scribe for MD Haven Walters.  Vivi Mariscal MD, [address] [facility] and Sports Medicine

## 2022-06-29 NOTE — PROGRESS NOTES
Patient was fitted and instructed on the use of a size M Ultrasling for the patient's left shoulder. I provided the following instructions along with proper fitting as noted below.  Fitting instructions included   o How to adjusting the thumb support and avoiding irritation to hand. Patient was instructed this should not be used until the block given at surgery has worn off and patient has regained feeling in hand.   o How to manage the quick release connection. o The shoulder straps are adjusted to insure correct fit including trimming any excess material.   o The shoulder strap crosses over the opposite shoulder being sure to avoid excess pull on neck  o Placement of pillow placed at the waist line of the affected shoulder with the Velcro facing away from body allowing for sling attachment.  Positioning the elbow in sling as far back as possible providing adequate support   Keeping the forearm close to the body preventing excessive ER    Keeping the hand higher than the elbow to allow for adequate support of arm in sling and avoiding excess swelling to hand.  How to detach the shoulder strap latanya and open front panel to allow for proper hygiene.  Patient was informed waist belt should stay in place at all times unless told otherwise by the Doctor or Physical Therapist.    Patient was also made aware to bring an oversized shirt to surgery to provide coverage and comfort when leaving the hospital.    The patient was instructed to bring ultrasling, oversized shirt, and iceman pad (if purchased or prescribed) with them on the day of surgery. Patient was fitted and instructed on the use of an Ultrasling for the patients left shoulder. I fitted patient with a size M ultrasling. Patient was instructed to position elbow in sling as far back as possible and that the arm should be internally rotated lying nicely against abdomen.  I demonstrated how the shoulder strap crosses over the opposite shoulder and connects to the quick release latanya on the sling. The thumb and sling strap were then placed correctly on the sling. The shoulder straps were adjusted to insure correct fit which can involve trimming excess material. Once the sling is correctly fitted, the pillow is then placed at the waist line of the affected shoulder with the Velcro facing away from body. The sling is attached to the outside of the pillow, along the hook and loop strips. I then buckled the waist strap to the pillow and adjusted the strap. During the fitting process I explained how to detach the shoulder strap latanya and open front panel to allow for proper hygiene. Patient was informed waist belt should stay in place at all times unless told otherwise by the Doctor or Physical Therapist.  Patient read and signed documenting they understand and agree to [facility]'s current DME return policy.

## 2022-07-01 RX ORDER — TURMERIC 400 MG
CAPSULE ORAL
COMMUNITY

## 2022-07-01 NOTE — PERIOP NOTE
Patient verified name and . Order for consent NOT found in EHR; patient verifies procedure from case posting. Type 1B surgery, phone assessment complete. Orders NOT received. Labs per surgeon: Unknown  Labs per anesthesia protocol: Potassium DOS - orders in    Patient answered medical/surgical history questions at their best of ability. All prior to admission medications documented in [facility]. Patient instructed to take the following medications the day of surgery according to anesthesia guidelines with a small sip of water: metoprolol and tamsulosin; may take oxycodone if needed. On the day before surgery please take Acetaminophen 1000mg in the morning and then again before bed. You may substitute for Tylenol 650 mg. Hold all vitamins 7 days prior to surgery and NSAIDS 5 days prior to surgery. Prescription meds to hold: Losartan/HCT    Patient instructed on the following:    > Arrive at [facility], time of arrival to be called the day before by 1700  > NPO after midnight, unless otherwise indicated, including gum, mints, and ice chips  > Responsible adult must drive patient to the hospital, stay during surgery, and patient will need supervision 24 hours after anesthesia  > Use antibacterial soap in shower the night before surgery and on the morning of surgery  > All piercings must be removed prior to arrival.    > Leave all valuables (money and jewelry) at home but bring insurance card and ID on DOS.   > You may be required to pay a deductible or co-pay on the day of your procedure. You can pre-pay by calling [phone] if your surgery is at the [facility] or [phone] if your surgery is at the [facility]. > Do not wear make-up, nail polish, lotions, cologne, perfumes, powders, or oil on skin. Artificial nails are not permitted.

## 2022-07-01 NOTE — PERIOP NOTE
Dear Mr. Garciatte Talisha,      Thank you for completing your phone assessment with me today. Here are your requested surgery instructions. Please call #[phone] with any questions/concerns. Your surgery is scheduled at [facility]: [address] [place] [address], [facility]. Please arrive at Outpatient Entrance; pre-op (#[phone] will call you on the business day before your surgery with your arrival time. If you have any questions on the day of surgery, please call the pre-op dept. at the telephone number above. If you are sick the day of surgery: fever >100 deg F, coughing up colored mucus, or have abdominal sickness (intractable nausea, vomiting or diarrhea) please call [phone] the day of surgery as early as possible and speak to a nurse about your symptoms. They will advise you on next steps. No food or drink after midnight which includes any gum, mints, candy, or ice chips. Please take these medications on the morning of surgery with a small sip of water: metoprolol and tamsulosin; may take oxycodone if needed. On the day before surgery take Acetaminophen 1000mg in the morning and at bedtime OR Acetaminophen 650mg in the morning, afternoon and bedtime. Please stop all vitamins/supplements 7 days prior to surgery and stop all NSAIDS (ibuprofen, naproxen, aleve, motrin, advil) 5 days before your surgery. A responsible adult must drive you to the hospital, remain in the building during surgery and you will need adult supervision for 24 hours after anesthesia. Please use an antibacterial soap (Dial, Safeguard, etc.) the night before surgery and on the morning of surgery. Do NOT wear: deodorant, make-up, nail polish, lotions, cologne, perfumes, powders or oil on your skin. All piercings/metal/jewelry must be removed prior to arrival.  If you wear contacts then you will need to bring a case to store them in or wear your glasses.      Please leave all your valuables at home but be sure to bring your insurance card and ID on the day of surgery for registration/identification. Our Guide to Surgery with additional information can be found:  [email or web address]    Emailed to: [email or web address]

## 2022-07-05 ENCOUNTER — ANESTHESIA EVENT (OUTPATIENT)
Dept: SURGERY | Age: 77
End: 2022-07-05
Payer: MEDICARE

## 2022-07-06 ENCOUNTER — ANESTHESIA (OUTPATIENT)
Dept: SURGERY | Age: 77
End: 2022-07-06
Payer: MEDICARE

## 2022-07-06 ENCOUNTER — HOSPITAL ENCOUNTER (OUTPATIENT)
Age: 77
Setting detail: OUTPATIENT SURGERY
Discharge: HOME | End: 2022-07-06
Attending: ORTHOPAEDIC SURGERY | Admitting: ORTHOPAEDIC SURGERY
Payer: MEDICARE

## 2022-07-06 VITALS
WEIGHT: 220 LBS | DIASTOLIC BLOOD PRESSURE: 67 MMHG | BODY MASS INDEX: 32.58 KG/M2 | RESPIRATION RATE: 13 BRPM | HEART RATE: 58 BPM | SYSTOLIC BLOOD PRESSURE: 136 MMHG | OXYGEN SATURATION: 92 % | HEIGHT: 69 IN | TEMPERATURE: 97.9 F

## 2022-07-06 LAB
GLUCOSE BLD STRIP.AUTO-MCNC: 112 MG/DL (ref 65–100)
POTASSIUM BLD-SCNC: 3.8 MMOL/L (ref 3.5–5.1)
SERVICE CMNT-IMP: ABNORMAL

## 2022-07-06 PROCEDURE — 2709999900 HC NON-CHARGEABLE SUPPLY: Performed by: ORTHOPAEDIC SURGERY

## 2022-07-06 PROCEDURE — 6370000000 HC RX 637 (ALT 250 FOR IP): Performed by: ANESTHESIOLOGY

## 2022-07-06 PROCEDURE — 6360000002 HC RX W HCPCS: Performed by: ANESTHESIOLOGY

## 2022-07-06 PROCEDURE — 2580000003 HC RX 258: Performed by: ANESTHESIOLOGY

## 2022-07-06 PROCEDURE — 29827 SHO ARTHRS SRG RT8TR CUF RPR: CPT | Performed by: ORTHOPAEDIC SURGERY

## 2022-07-06 PROCEDURE — 64415 NJX AA&/STRD BRCH PLXS IMG: CPT | Performed by: ANESTHESIOLOGY

## 2022-07-06 PROCEDURE — 3600000014 HC SURGERY LEVEL 4 ADDTL 15MIN: Performed by: ORTHOPAEDIC SURGERY

## 2022-07-06 PROCEDURE — 29824 SHO ARTHRS SRG DSTL CLAVICLC: CPT | Performed by: ORTHOPAEDIC SURGERY

## 2022-07-06 PROCEDURE — 3700000001 HC ADD 15 MINUTES (ANESTHESIA): Performed by: ORTHOPAEDIC SURGERY

## 2022-07-06 PROCEDURE — 2500000003 HC RX 250 WO HCPCS: Performed by: NURSE ANESTHETIST, CERTIFIED REGISTERED

## 2022-07-06 PROCEDURE — 6360000002 HC RX W HCPCS: Performed by: ORTHOPAEDIC SURGERY

## 2022-07-06 PROCEDURE — C1713 ANCHOR/SCREW BN/BN,TIS/BN: HCPCS | Performed by: ORTHOPAEDIC SURGERY

## 2022-07-06 PROCEDURE — 29823 SHO ARTHRS SRG XTNSV DBRDMT: CPT | Performed by: ORTHOPAEDIC SURGERY

## 2022-07-06 PROCEDURE — 7100000010 HC PHASE II RECOVERY - FIRST 15 MIN: Performed by: ORTHOPAEDIC SURGERY

## 2022-07-06 PROCEDURE — 3700000000 HC ANESTHESIA ATTENDED CARE: Performed by: ORTHOPAEDIC SURGERY

## 2022-07-06 PROCEDURE — 82962 GLUCOSE BLOOD TEST: CPT

## 2022-07-06 PROCEDURE — 3600000004 HC SURGERY LEVEL 4 BASE: Performed by: ORTHOPAEDIC SURGERY

## 2022-07-06 PROCEDURE — 7100000000 HC PACU RECOVERY - FIRST 15 MIN: Performed by: ORTHOPAEDIC SURGERY

## 2022-07-06 PROCEDURE — 2500000003 HC RX 250 WO HCPCS

## 2022-07-06 PROCEDURE — 6360000002 HC RX W HCPCS: Performed by: NURSE ANESTHETIST, CERTIFIED REGISTERED

## 2022-07-06 PROCEDURE — 29826 SHO ARTHRS SRG DECOMPRESSION: CPT | Performed by: ORTHOPAEDIC SURGERY

## 2022-07-06 PROCEDURE — 2720000010 HC SURG SUPPLY STERILE: Performed by: ORTHOPAEDIC SURGERY

## 2022-07-06 PROCEDURE — 84132 ASSAY OF SERUM POTASSIUM: CPT

## 2022-07-06 PROCEDURE — 6360000002 HC RX W HCPCS: Performed by: SPECIALIST/TECHNOLOGIST

## 2022-07-06 PROCEDURE — 7100000001 HC PACU RECOVERY - ADDTL 15 MIN: Performed by: ORTHOPAEDIC SURGERY

## 2022-07-06 DEVICE — SYSTEM IMPL 4.75MM SWIVELOCK C ANCHR W/ SCORPION NDL 1 PRELD: Type: IMPLANTABLE DEVICE | Site: SHOULDER | Status: FUNCTIONAL

## 2022-07-06 RX ORDER — LIDOCAINE HYDROCHLORIDE 20 MG/ML
INJECTION, SOLUTION EPIDURAL; INFILTRATION; INTRACAUDAL; PERINEURAL PRN
Status: DISCONTINUED | OUTPATIENT
Start: 2022-07-06 | End: 2022-07-06 | Stop reason: SDUPTHER

## 2022-07-06 RX ORDER — DEXTROSE MONOHYDRATE 50 MG/ML
100 INJECTION, SOLUTION INTRAVENOUS PRN
Status: DISCONTINUED | OUTPATIENT
Start: 2022-07-06 | End: 2022-07-06 | Stop reason: HOSPADM

## 2022-07-06 RX ORDER — SODIUM CHLORIDE, SODIUM LACTATE, POTASSIUM CHLORIDE, CALCIUM CHLORIDE 600; 310; 30; 20 MG/100ML; MG/100ML; MG/100ML; MG/100ML
INJECTION, SOLUTION INTRAVENOUS CONTINUOUS
Status: DISCONTINUED | OUTPATIENT
Start: 2022-07-06 | End: 2022-07-06 | Stop reason: HOSPADM

## 2022-07-06 RX ORDER — HYDROMORPHONE HYDROCHLORIDE 2 MG/ML
0.5 INJECTION, SOLUTION INTRAMUSCULAR; INTRAVENOUS; SUBCUTANEOUS EVERY 10 MIN PRN
Status: DISCONTINUED | OUTPATIENT
Start: 2022-07-06 | End: 2022-07-06 | Stop reason: HOSPADM

## 2022-07-06 RX ORDER — SODIUM CHLORIDE 0.9 % (FLUSH) 0.9 %
5-40 SYRINGE (ML) INJECTION PRN
Status: DISCONTINUED | OUTPATIENT
Start: 2022-07-06 | End: 2022-07-06 | Stop reason: HOSPADM

## 2022-07-06 RX ORDER — PROPOFOL 10 MG/ML
INJECTION, EMULSION INTRAVENOUS PRN
Status: DISCONTINUED | OUTPATIENT
Start: 2022-07-06 | End: 2022-07-06 | Stop reason: SDUPTHER

## 2022-07-06 RX ORDER — DEXAMETHASONE SODIUM PHOSPHATE 4 MG/ML
INJECTION, SOLUTION INTRA-ARTICULAR; INTRALESIONAL; INTRAMUSCULAR; INTRAVENOUS; SOFT TISSUE PRN
Status: DISCONTINUED | OUTPATIENT
Start: 2022-07-06 | End: 2022-07-06 | Stop reason: SDUPTHER

## 2022-07-06 RX ORDER — ACETAMINOPHEN 500 MG
1000 TABLET ORAL ONCE
Status: COMPLETED | OUTPATIENT
Start: 2022-07-06 | End: 2022-07-06

## 2022-07-06 RX ORDER — MIDAZOLAM HYDROCHLORIDE 2 MG/2ML
2 INJECTION, SOLUTION INTRAMUSCULAR; INTRAVENOUS
Status: COMPLETED | OUTPATIENT
Start: 2022-07-06 | End: 2022-07-06

## 2022-07-06 RX ORDER — LIDOCAINE HYDROCHLORIDE 10 MG/ML
1 INJECTION, SOLUTION EPIDURAL; INFILTRATION; INTRACAUDAL; PERINEURAL
Status: DISCONTINUED | OUTPATIENT
Start: 2022-07-06 | End: 2022-07-06 | Stop reason: HOSPADM

## 2022-07-06 RX ORDER — SODIUM CHLORIDE 9 MG/ML
INJECTION, SOLUTION INTRAVENOUS PRN
Status: DISCONTINUED | OUTPATIENT
Start: 2022-07-06 | End: 2022-07-06 | Stop reason: HOSPADM

## 2022-07-06 RX ORDER — ONDANSETRON 2 MG/ML
INJECTION INTRAMUSCULAR; INTRAVENOUS PRN
Status: DISCONTINUED | OUTPATIENT
Start: 2022-07-06 | End: 2022-07-06 | Stop reason: SDUPTHER

## 2022-07-06 RX ORDER — SODIUM CHLORIDE 0.9 % (FLUSH) 0.9 %
5-40 SYRINGE (ML) INJECTION EVERY 12 HOURS SCHEDULED
Status: DISCONTINUED | OUTPATIENT
Start: 2022-07-06 | End: 2022-07-06 | Stop reason: HOSPADM

## 2022-07-06 RX ORDER — DIPHENHYDRAMINE HYDROCHLORIDE 50 MG/ML
12.5 INJECTION INTRAMUSCULAR; INTRAVENOUS
Status: DISCONTINUED | OUTPATIENT
Start: 2022-07-06 | End: 2022-07-06 | Stop reason: HOSPADM

## 2022-07-06 RX ORDER — GLYCOPYRROLATE 0.2 MG/ML
INJECTION INTRAMUSCULAR; INTRAVENOUS PRN
Status: DISCONTINUED | OUTPATIENT
Start: 2022-07-06 | End: 2022-07-06 | Stop reason: SDUPTHER

## 2022-07-06 RX ORDER — PROCHLORPERAZINE EDISYLATE 5 MG/ML
5 INJECTION INTRAMUSCULAR; INTRAVENOUS
Status: DISCONTINUED | OUTPATIENT
Start: 2022-07-06 | End: 2022-07-06 | Stop reason: HOSPADM

## 2022-07-06 RX ORDER — NEOSTIGMINE METHYLSULFATE 1 MG/ML
INJECTION, SOLUTION INTRAVENOUS PRN
Status: DISCONTINUED | OUTPATIENT
Start: 2022-07-06 | End: 2022-07-06 | Stop reason: SDUPTHER

## 2022-07-06 RX ORDER — EPINEPHRINE 1 MG/ML(1)
AMPUL (ML) INJECTION PRN
Status: DISCONTINUED | OUTPATIENT
Start: 2022-07-06 | End: 2022-07-06 | Stop reason: ALTCHOICE

## 2022-07-06 RX ORDER — PHENYLEPHRINE HYDROCHLORIDE 10 MG/ML
INJECTION INTRAVENOUS PRN
Status: DISCONTINUED | OUTPATIENT
Start: 2022-07-06 | End: 2022-07-06 | Stop reason: SDUPTHER

## 2022-07-06 RX ORDER — FENTANYL CITRATE 50 UG/ML
INJECTION, SOLUTION INTRAMUSCULAR; INTRAVENOUS PRN
Status: DISCONTINUED | OUTPATIENT
Start: 2022-07-06 | End: 2022-07-06 | Stop reason: SDUPTHER

## 2022-07-06 RX ORDER — TRANEXAMIC ACID 100 MG/ML
INJECTION, SOLUTION INTRAVENOUS PRN
Status: DISCONTINUED | OUTPATIENT
Start: 2022-07-06 | End: 2022-07-06 | Stop reason: SDUPTHER

## 2022-07-06 RX ORDER — OXYCODONE HYDROCHLORIDE 5 MG/1
5 TABLET ORAL
Status: DISCONTINUED | OUTPATIENT
Start: 2022-07-06 | End: 2022-07-06 | Stop reason: HOSPADM

## 2022-07-06 RX ORDER — FENTANYL CITRATE 50 UG/ML
100 INJECTION, SOLUTION INTRAMUSCULAR; INTRAVENOUS
Status: COMPLETED | OUTPATIENT
Start: 2022-07-06 | End: 2022-07-06

## 2022-07-06 RX ORDER — ROCURONIUM BROMIDE 10 MG/ML
INJECTION, SOLUTION INTRAVENOUS PRN
Status: DISCONTINUED | OUTPATIENT
Start: 2022-07-06 | End: 2022-07-06 | Stop reason: SDUPTHER

## 2022-07-06 RX ORDER — EPHEDRINE SULFATE/0.9% NACL/PF 50 MG/5 ML
SYRINGE (ML) INTRAVENOUS PRN
Status: DISCONTINUED | OUTPATIENT
Start: 2022-07-06 | End: 2022-07-06 | Stop reason: SDUPTHER

## 2022-07-06 RX ORDER — ROPIVACAINE HYDROCHLORIDE 5 MG/ML
INJECTION, SOLUTION EPIDURAL; INFILTRATION; PERINEURAL
Status: COMPLETED | OUTPATIENT
Start: 2022-07-06 | End: 2022-07-06

## 2022-07-06 RX ORDER — GLUCAGON 1 MG/ML
1 KIT INJECTION PRN
Status: DISCONTINUED | OUTPATIENT
Start: 2022-07-06 | End: 2022-07-06 | Stop reason: HOSPADM

## 2022-07-06 RX ADMIN — GLYCOPYRROLATE 0.7 MG: 0.2 INJECTION INTRAMUSCULAR; INTRAVENOUS at 12:41

## 2022-07-06 RX ADMIN — ONDANSETRON 4 MG: 2 INJECTION INTRAMUSCULAR; INTRAVENOUS at 11:45

## 2022-07-06 RX ADMIN — Medication 2 G: at 11:42

## 2022-07-06 RX ADMIN — ROCURONIUM BROMIDE 50 MG: 50 INJECTION, SOLUTION INTRAVENOUS at 11:29

## 2022-07-06 RX ADMIN — DEXAMETHASONE SODIUM PHOSPHATE 4 MG: 4 INJECTION, SOLUTION INTRAMUSCULAR; INTRAVENOUS at 11:45

## 2022-07-06 RX ADMIN — SODIUM CHLORIDE, POTASSIUM CHLORIDE, SODIUM LACTATE AND CALCIUM CHLORIDE: 600; 310; 30; 20 INJECTION, SOLUTION INTRAVENOUS at 09:10

## 2022-07-06 RX ADMIN — FENTANYL CITRATE 25 MCG: 50 INJECTION, SOLUTION INTRAMUSCULAR; INTRAVENOUS at 12:21

## 2022-07-06 RX ADMIN — ACETAMINOPHEN 1000 MG: 500 TABLET, FILM COATED ORAL at 09:09

## 2022-07-06 RX ADMIN — PROPOFOL 200 MG: 10 INJECTION, EMULSION INTRAVENOUS at 11:29

## 2022-07-06 RX ADMIN — PHENYLEPHRINE HYDROCHLORIDE 75 MCG: 10 INJECTION INTRAVENOUS at 11:48

## 2022-07-06 RX ADMIN — Medication 10 MG: at 11:37

## 2022-07-06 RX ADMIN — Medication 10 MG: at 11:48

## 2022-07-06 RX ADMIN — MIDAZOLAM 2 MG: 1 INJECTION INTRAMUSCULAR; INTRAVENOUS at 10:33

## 2022-07-06 RX ADMIN — FENTANYL CITRATE 100 MCG: 50 INJECTION, SOLUTION INTRAMUSCULAR; INTRAVENOUS at 10:33

## 2022-07-06 RX ADMIN — DEXAMETHASONE SODIUM PHOSPHATE 4 MG: 4 INJECTION, SOLUTION INTRAMUSCULAR; INTRAVENOUS at 10:33

## 2022-07-06 RX ADMIN — ROPIVACAINE HYDROCHLORIDE 30 ML: 5 INJECTION, SOLUTION EPIDURAL; INFILTRATION; PERINEURAL at 10:33

## 2022-07-06 RX ADMIN — PHENYLEPHRINE HYDROCHLORIDE 75 MCG: 10 INJECTION INTRAVENOUS at 11:37

## 2022-07-06 RX ADMIN — LIDOCAINE HYDROCHLORIDE 50 MG: 20 INJECTION, SOLUTION EPIDURAL; INFILTRATION; INTRACAUDAL; PERINEURAL at 11:29

## 2022-07-06 RX ADMIN — Medication 15 MG: at 11:33

## 2022-07-06 RX ADMIN — Medication 4 MG: at 12:41

## 2022-07-06 RX ADMIN — TRANEXAMIC ACID 1000 MG: 100 INJECTION, SOLUTION INTRAVENOUS at 11:50

## 2022-07-06 ASSESSMENT — PAIN SCALES - GENERAL: PAINLEVEL_OUTOF10: 0

## 2022-07-06 NOTE — INTERVAL H&P NOTE
Update History & Physical    The Patient's History and Physical was reviewed   I discussed the surgery and patients medical condition with the patient. The chart was reviewed with the patient and I examined the patient. There was no change from previous note. The surgical site was confirmed by the patient and me. CV: RRR  RESP: CTAB    Plan:  The risk, benefits, expected outcome, and alternative to the recommended procedure have been discussed with the patient. Patient understands and elects to proceed with the procedure as planned.     Electronically signed by Blanca Pacheco MD on 07/06/22 10:16 AM

## 2022-07-06 NOTE — ANESTHESIA PRE PROCEDURE
Department of Anesthesiology  Preprocedure Note       Name:  Branden Waldron   Age:  77 y.o.  :  1945                                          MRN:  [id number]         Date:  2022      Surgeon: Tami Last):  Komal Santoro MD    Procedure: Procedure(s):  LEFT SHOULDER ARTHROSCOPY BICEPS TENODESIS, SUBACROMIAL DECOMPRESSION, ROTATOR CUFF REPAIR, DISTAL CLAVICLE RESECTION/ INTERSCALENE GEN/SCAL    Medications prior to admission:   Prior to Admission medications    Medication Sig Start Date End Date Taking?  Authorizing Provider   Multiple Vitamin (MULTIVITAMIN ADULT PO) Take by mouth daily   Yes Historical Provider, MD   Coenzyme Q10 (COQ10 PO) Take by mouth daily     Historical Provider, MD   Turmeric 400 MG CAPS Take by mouth    Historical Provider, MD   atorvastatin (LIPITOR) 20 MG tablet TAKE 1 TABLET DAILY 3/22/22   Ar Automatic Reconciliation   losartan-hydroCHLOROthiazide (HYZAAR) 100-25 MG per tablet TAKE 1 TABLET DAILY 3/22/22   Ar Automatic Reconciliation   metoprolol tartrate (LOPRESSOR) 25 MG tablet TAKE 1 TABLET TWICE A DAY 3/22/22   Ar Automatic Reconciliation   tamsulosin (FLOMAX) 0.4 MG capsule Take 0.4 mg by mouth daily 21   Ar Automatic Reconciliation       Current medications:    Current Facility-Administered Medications   Medication Dose Route Frequency Provider Last Rate Last Admin    sodium chloride flush 0.9 % injection 5-40 mL  5-40 mL IntraVENous 2 times per day TANIA Monsivais - CNP        sodium chloride flush 0.9 % injection 5-40 mL  5-40 mL IntraVENous PRN TANIA Monsivais - CNP        0.9 % sodium chloride infusion   IntraVENous PRN Leidy Bear APRN - CNP        ceFAZolin (ANCEF) 2000 mg in sterile water 20 mL IV syringe  2,000 mg IntraVENous On Call to [address] [place] [address], TANIA - CNP        lidocaine PF 1 % injection 1 mL  1 mL IntraDERmal Once PRN Sincere Rodrigues MD        fentaNYL (SUBLIMAZE) injection 100 mcg  100 mcg IntraVENous Once PRN Sincere Rodrigues MD  lactated ringers infusion   IntraVENous Continuous Jonah Castro  mL/hr at 07/06/22 0915 NoRateChange at 07/06/22 0915    sodium chloride flush 0.9 % injection 5-40 mL  5-40 mL IntraVENous 2 times per day Jonah Castro MD        sodium chloride flush 0.9 % injection 5-40 mL  5-40 mL IntraVENous PRN Jonah Castro MD        0.9 % sodium chloride infusion   IntraVENous PRN Jonah Castro MD        midazolam PF (VERSED) injection 2 mg  2 mg IntraVENous Once PRN Jonah Castro MD           Allergies:     Allergies   Allergen Reactions    Pravastatin Diarrhea       Problem List:    Patient Active Problem List   Diagnosis Code    Pure hypercholesterolemia E78.00    Hx of stem cell transplant ([facility] Z94.84    S/P right unicompartmental knee replacement Z96.651    BPH with obstruction/lower urinary tract symptoms N40.1, N13.8    Hypertension I10    Sebaceous cyst L72.3    Osteoarthritis B47.30    [facility] E11.9    Nontraumatic incomplete tear of left rotator cuff M75.112    Biceps tendinitis of left shoulder M75.22       Past Medical History:        Diagnosis Date    Arthritis     Hyperlipidemia     Hypertension     medication    Type II or unspecified type diabetes mellitus without mention of complication, not stated as uncontrolled 03/09/2015    diet controlled and prediabetes per patient; avg fasting  - 120       Past Surgical History:        Procedure Laterality Date    COLONOSCOPY      HEENT  11/2014    skin cancer removed    HEENT      Lasik    LIMBAL STEM CELL TRANSPLANT Left     left knee - stem cell injection left knee    ORTHOPEDIC SURGERY      left foot plantar fasciitis    ORTHOPEDIC SURGERY  03/2013    right knee partial knee replacment - Dr. Brissa Coyle      right inguinal hernia repair       Social History:    Social History     Tobacco Use    Smoking status: Former Smoker     Quit date: 3/12/1980     Years since quittin.3    Smokeless tobacco: Never Used   Substance Use Topics    Alcohol use: No                                Counseling given: Not Answered      Vital Signs (Current):   Vitals:    22 0940 22 0902   BP:  (!) 167/79   Pulse:  (!) 47   Resp:  18   Temp:  98.3 °F (36.8 °C)   TempSrc:  Oral   SpO2:  96%   Weight: 220 lb (99.8 kg) 220 lb (99.8 kg)   Height: 5' 9\" (1.753 m)                                               BP Readings from Last 3 Encounters:   22 (!) 167/79   21 139/72   21 118/62       NPO Status: Time of last liquid consumption:                         Time of last solid consumption:                         Date of last liquid consumption: 22                        Date of last solid food consumption: 22    BMI:   Wt Readings from Last 3 Encounters:   22 220 lb (99.8 kg)   22 220 lb (99.8 kg)   21 225 lb (102.1 kg)     Body mass index is 32.49 kg/m².     CBC:   Lab Results   Component Value Date/Time    WBC 10.6 2020 10:14 PM    RBC 5.21 2020 10:14 PM    HGB 15.2 2020 10:14 PM    HCT 46.8 2020 10:14 PM    MCV 89.8 2020 10:14 PM    RDW 13.3 2020 10:14 PM     2020 10:14 PM       CMP:   Lab Results   Component Value Date/Time     2021 10:11 AM    K 4.3 2021 10:11 AM     2021 10:11 AM    CO2 27 2021 10:11 AM    BUN 14 2021 10:11 AM    CREATININE 1.11 2021 10:11 AM    GFRAA 75 2021 10:11 AM    AGRATIO 1.6 2021 10:11 AM    GLUCOSE 120 2021 10:11 AM    PROT 7.2 2021 10:11 AM    CALCIUM 9.7 2021 10:11 AM    BILITOT 0.5 2021 10:11 AM    ALKPHOS 105 2021 10:11 AM    AST 26 2021 10:11 AM    ALT 24 2021 10:11 AM       POC Tests:   Recent Labs     22  0907   POCGLU 112*   POCK 3.8       Coags: No results found for: PROTIME, INR, APTT    HCG (If Applicable): No results found for: PREGTESTUR, PREGSERUM, HCG, HCGQUANT     ABGs: No results found for: PHART, PO2ART, AXN5JNY, BLW8QCD, BEART, Z6HDRERQ     Type & Screen (If Applicable):  No results found for: LABABO, LABRH    Drug/Infectious Status (If Applicable):  No results found for: HIV, HEPCAB    COVID-19 Screening (If Applicable): No results found for: COVID19        Anesthesia Evaluation  Patient summary reviewed and Nursing notes reviewed no history of anesthetic complications:   Airway: Mallampati: II  TM distance: >3 FB   Neck ROM: full  Mouth opening: > = 3 FB   Dental: normal exam         Pulmonary:Negative Pulmonary ROS breath sounds clear to auscultation                             Cardiovascular:  Exercise tolerance: good (>4 METS), Active and denied chest pain, SOB, syncope   (+) hypertension:,         Rhythm: regular  Rate: normal                    Neuro/Psych:   Negative Neuro/Psych ROS              GI/Hepatic/Renal:            ROS comment: Obesity . Endo/Other:    (+) Diabetes (diet controlled - no meds)Type II DM, , : arthritis:., .                 Abdominal:             Vascular: negative vascular ROS. Other Findings:           Anesthesia Plan      general     ASA 2     (GETA + PNB)  Induction: intravenous. Anesthetic plan and risks discussed with patient and spouse.               Post-op pain plan if not by surgeon: single peripheral nerve block            Hernandez Mallory MD   7/6/2022

## 2022-07-06 NOTE — ANESTHESIA PROCEDURE NOTES
Peripheral Block    Patient location during procedure: pre-op  Reason for block: post-op pain management and at surgeon's request  Start time: 7/6/2022 10:33 AM  End time: 7/6/2022 10:37 AM  Staffing  Performed: anesthesiologist   Anesthesiologist: Caroline Pradhan MD  Preanesthetic Checklist  Completed: patient identified, IV checked, site marked, risks and benefits discussed, surgical/procedural consents, equipment checked, pre-op evaluation, timeout performed, anesthesia consent given, oxygen available and monitors applied/VS acknowledged  Peripheral Block   Patient position: sitting  Prep: ChloraPrep  Provider prep: sterile gloves and mask  Patient monitoring: cardiac monitor, continuous pulse ox, frequent blood pressure checks, IV access, oxygen and responsive to questions  Block type: Brachial plexus  Interscalene  Laterality: left  Injection technique: single-shot  Guidance: ultrasound guided    Needle   Needle type: insulated echogenic nerve stimulator needle   Needle localization: ultrasound guidance  Assessment   Injection assessment: negative aspiration for heme, no paresthesia on injection, local visualized surrounding nerve on ultrasound and no intravascular symptoms  Paresthesia pain: none  Slow fractionated injection: yes  Hemodynamics: stable  Real-time US image taken/store: yes  Outcomes: uncomplicated and patient tolerated procedure well    Additional Notes  Ultrasound image taken and stored in chart   Medications Administered  Ropivacaine (NAROPIN) injection 0.5%, 30 mL

## 2022-07-06 NOTE — OP NOTE
Operative Report    Patient: Fanny Humphries MRN: [id number]  SSN: [id number]    YOB: 1945  Age: 77 y.o. Sex: male       Date of Surgery: 7/6/22    Preoperative Diagnosis:   1)Left Shoulder Rotator cuff tear  2)Left Shoulder outlet impingement  3) left shoulder AC joint arthritis  4) Left Shoulder biceps tendinitis/superior labral tear    Postoperative Diagnosis: Same as above     Surgeon(s) and Role:     * Katiuska East MD - Primary    Anesthesia: General + ISB block    Procedure: 1) Left Shoulder Arthroscopic Rotator Cuff Repair  2)  Left shoulder arthroscopy with distal clavicle resection  3) Left Shoulder Arthroscopy with subacromial decompression  4) Left shoulder arthroscopy with extensive debridement including labrum, glenohumeral capsule, and biceps tenotomy      Estimated Blood Loss:  5 mL      Implants: Arthrex speedbridge           Specimens: * No specimens in log *        Drains: None                Complications: None    Counts: Sponge and needle counts were correct times two. Procedure in Detail: After informed consent was obtained the surgical site was marked in the preoperative area by myself and the patient was brought to the operating room and general anesthesia was induced. The patient was positioned in the beachchair position with all bony prominences well-padded and the operative shoulder was prepped and draped in the usual orthopedic sterile fashion. Timeout was performed per protocol antibiotics given per protocol. Initially a standard posterior lateral arthroscopy viewing portal was established. Diagnostic arthroscopy was carried out. There was maintenance of the articular surface of the humeral head and glenoid. The subscapularis was intact with good tension. Rotator interval had an extensive amount of inflammation and synovitis. The synovitis extended around the glenohumeral joint superiorly and posteriorly.  Anterior interval portal was established and work from the anterior portal exposing the end of the distal clavicle using an RF device. There was significant osteophyte formation and joint space narrowing. We used a motorized bur to resect a few millimeters of the medial acromion and then turned our attention to the distal clavicle. Motorized bur was used to perform a distal clavicle resection resecting 8 to 10 mm of space at the [facility] joint addressing all osteophytes and taking care to get posterior and superior under arthroscopic visualization. RF device was used to obtain hemostasis. Rotator cuff from the bursal side demonstrated a crescent shaped tear of approximately 2.5 cm at the leading edge of the supraspinatus. Greater tuberosity was gently decorticated using motorized shaver in preparation for healing. [place] placement portal was created just off the lateral edge of the acromion. Speed bridge 4.75 mm swivel lock anchors were inserted just off the articular margin in the anterior and posterior aspects of the tear respectively. Suture passing device was then used to pass the switched fiber tapes into the anterior and posterior half of the tear. The eyelet sutures from the anterior anchor were placed in a mattress fashion in the central portion of the tear and tied with sliding locking arthroscopic knots. These sutures from each pass were then split and crossed in a transosseous equivalent double row rotator cuff repair speed bridge construct into 2 knotless 4.75 mm lateral row swivel lock anchors that were tensioned appropriately. I was pleased with the quality of the bone and the rotator cuff tissue and the restoration of the footprint. The rotator cuff was moving as a unit with the tuberosity upon rotation of the arm. Shoulder was then drained portal sites were closed with buried Monocryl suture and Steri-Strips sterile dressing cryotherapy device and sling and abduction pillow were applied.   Venancio tolerated the procedure well was awakened and transferred to the PACU in stable condition    Discharge plan:  Disposition: Home  Rehab protocol: less than 3 cm rotator cuff repair and biceps tenotomy protocol.         Signed By:  Divya Crouch MD     July 6, 2022

## 2022-07-06 NOTE — PERIOP NOTE
Discharge instructions reviewed with pt and pt's wife, Bhumi Jane, at bedside. No questions or concerns at this time. Pt has already picked up rx from pharm.

## 2022-07-06 NOTE — ANESTHESIA PROCEDURE NOTES
Airway  Urgency: elective      General Information and Staff    Patient location during procedure: OR  Performed: resident/CRNA     Indications and Patient Condition  Indications for airway management: anesthesia  Spontaneous Ventilation: absent  Sedation level: deep  Preoxygenated: yes  Patient position: sniffing  MILS not maintained throughout  Mask difficulty assessment: vent by bag mask    Final Airway Details  Final airway type: endotracheal airway      Successful airway: ETT  Cuffed: yes   Successful intubation technique: direct laryngoscopy  Endotracheal tube insertion site: oral  Blade: Chu  Blade size: #4  ETT size (mm): 8.0  Cormack-Lehane Classification: grade I - full view of glottis  Placement verified by: chest auscultation and capnometry   Measured from: teeth  ETT to teeth (cm): 23  Number of attempts at approach: 1

## 2022-07-06 NOTE — ANESTHESIA POSTPROCEDURE EVALUATION
Department of Anesthesiology  Postprocedure Note    Patient: Fanny Humphries  MRN: [id number]  YOB: 1945  Date of evaluation: 7/6/2022      Procedure Summary     Date: 07/06/22 Room / Location: [facility] OP OR 06 / [facility]    Anesthesia Start: 1120 Anesthesia Stop: 1305    Procedure: LEFT SHOULDER ARTHROSCOPY BICEPS TENOTOMY SUBACROMIAL DECOMPRESSION, ROTATOR CUFF REPAIR, DISTAL CLAVICLE (Left Shoulder) Diagnosis:       Nontraumatic incomplete tear of left rotator cuff      Left shoulder pain, unspecified chronicity      (Nontraumatic incomplete tear of left rotator cuff [M75.112])    Surgeons: Katiuska East MD Responsible Provider: Cristobal Jennings MD    Anesthesia Type: General ASA Status: 2          Anesthesia Type: General    Ramin Phase I: Ramin Score: 5    Ramin Phase II: Ramin Score: 10      Anesthesia Post Evaluation    Patient location during evaluation: PACU  Patient participation: complete - patient participated  Level of consciousness: awake and alert  Airway patency: patent  Nausea: well controlled. Complications: no  Cardiovascular status: acceptable.   Respiratory status: acceptable  Hydration status: stable

## 2022-07-08 ENCOUNTER — HOSPITAL ENCOUNTER (OUTPATIENT)
Dept: PHYSICAL THERAPY | Age: 77
Setting detail: RECURRING SERIES
End: 2022-07-08
Payer: MEDICARE

## 2022-07-08 ENCOUNTER — HOSPITAL ENCOUNTER (OUTPATIENT)
Dept: PHYSICAL THERAPY | Age: 77
Setting detail: RECURRING SERIES
Discharge: HOME | End: 2022-07-11
Payer: MEDICARE

## 2022-07-08 PROCEDURE — 97016 VASOPNEUMATIC DEVICE THERAPY: CPT

## 2022-07-08 PROCEDURE — 97140 MANUAL THERAPY 1/> REGIONS: CPT

## 2022-07-08 PROCEDURE — 97110 THERAPEUTIC EXERCISES: CPT

## 2022-07-08 PROCEDURE — 97161 PT EVAL LOW COMPLEX 20 MIN: CPT

## 2022-07-08 ASSESSMENT — PAIN SCALES - GENERAL: PAINLEVEL_OUTOF10: 3

## 2022-07-08 NOTE — PROGRESS NOTES
Ana M Diaz  : 1945  Primary: Abdulaziz Settler Ppo  Secondary:  [address] [facility] [address]  Phone: [phone]  Fax: [phone] Plan Frequency: 2 times a week for 12 weeks    Plan of Care/Certification Expiration Date: 22 (start of plan of care 2022)      PT Visit Info:   No data recorded    OUTPATIENT PHYSICAL THERAPY:OP NOTE TYPE: Treatment Note 2022       Episode  }Appt Desk              Treatment Diagnosis: Pain in left shoulder (M25.512)  Incomplete rotator cuff tear or rupture of left shoulder, not specified as traumatic (M75.112)  Bicipital tendinitis, left shoulder (M75.22)  Impingement syndrome of left shoulder (M75.42)  Medical/Referring Diagnosis:  Incomplete rotator cuff tear or rupture of left shoulder, not specified as traumatic [M75.112]  Bicipital tendinitis, left shoulder [M75.22]  Impingement syndrome of left shoulder [M75.42]  Referring Physician:  Lauren Manuel MD MD Orders:  PT Eval and Treat   Physician Guidelines:  Weeks 0 - 1 (2022 - 2022): Sling with abduction pillow at all times except while showering and completing exercises; Full wrist and elbow ROM; Scapular exercises   Weeks 2 - 6 (2022 - 8/10/2022): D/C abduction pillow at 4 weeks. May begin weaning out of sling completely between 4 and 6 weeks unless otherwise specified; Forward flexion: 0-90°, external rotation: 0-30° -All ROM is passive; Progress to full by week 6; May begin isometric strengthening at week 6   Weeks 7 - 12 (2022- 2022): Full PROM in all directions; Progression of AAROM and AROM as tolerated ; May progress cuff strengthening advancing to dynamic strengthening; Scapular stabilization and proprioceptive exercises beginning week 10   Months 3+ (after 2022):  Light plyometrics may begin at month 3 or 4; Sport specific/functional exercises at month 4   Return MD Appt: 7/19/2022  Date of Onset:  Onset Date: 07/06/22 (post op)     Allergies:   Pravastatin  Restrictions/Precautions:  Restrictions/Precautions: Surgical protocol  No data recorded   Interventions Planned (Treatment may consist of any combination of the following):    Current Treatment Recommendations: Strengthening; ROM; Functional mobility training; Transfer training; ADL/Self-care training; Neuromuscular re-education; Manual Therapy - Soft Tissue Mobilization; Pain management; Return to work related activity; Home exercise program; Safety education & training; Modalities; Positioning; Integrated dry needling; Therapeutic activities     Subjective Comments:  Patient reports feeling pretty good considering he just had surgery. Is eager to sleep better. Initial:}    3/10Post Session:       2/10  Medications Last Reviewed:  7/8/2022  Updated Objective Findings:  See evaluation note from today  Treatment   THERAPEUTIC EXERCISE: (10 minutes):    Exercises per grid below to improve mobility, strength, coordination and dynamic movement of left shoulder to improve functional lifting, carrying, reaching and overhead activites. Required moderate visual, verbal, manual and tactile cues to promote proper body alignment, promote proper body posture and promote proper body mechanics. Progressed resistance, range, repetitions and complexity of movement as indicated. Date:  7/8/2022 Date:   Date:     Activity/Exercise Parameters Parameters Parameters   Scapular retractions X 10     Shrugs X 10     Elbow flexion/ extension X 10     Wrist flexion/ extension X 10     Pronation/ supination X 10     Open/ close hand X 10                             Medbridge Access Code: Rentae Mejía    Time spent with patient reviewing proper muscle recruitment and technique with exercises.      MANUAL THERAPY: (15 minutes):   Joint mobilization and Soft tissue mobilization was utilized and necessary because of the patient's restricted joint motion, Stephane Carr, PT SFORPWD SFO   8/4/2022 10:00 AM Le Or, PTA SFORPWD SFO   8/9/2022 10:00 AM Sonda Ast, PT SFORPWD SFO   8/11/2022 10:00 AM Le Or, PTA SFORPWD SFO   8/15/2022 10:45 AM FPA MISCELLANEOUS FPA GVL AMB   8/16/2022 10:00 AM Sonda Ast, PT SFORPWD SFO   8/18/2022  8:00 PM Le Or, PTA SFORPWD SFO   8/23/2022 10:00 AM Sonda Ast, PT SFORPWD SFO   8/25/2022 10:00 AM Le Or, PTA SFORPWD SFO   8/26/2022  9:30 AM Figueroa Moore MD FPA GVL AMB   8/30/2022 10:00 AM Le Or, PTA SFORPWD SFO   9/1/2022 10:00 AM Sonda Ast, PT SFORPWD SFO   9/6/2022 10:00 AM Le Or, PTA SFORPWD SFO   9/8/2022 10:00 AM Sonda Ast, PT SFORPWD SFO   9/13/2022 10:00 AM Sonda Ast, PT SFORPWD SFO   9/15/2022 10:00 AM Le Or, PTA SFORPWD SFO   9/20/2022 10:00 AM Delmus Para, PTA SFORPWD SFO   9/22/2022 10:00 AM Le Or, PTA SFORPWD SFO   9/27/2022 10:00 AM Le Or, PTA SFORPWD SFO   9/29/2022 10:00 AM Sonda Ast, PT SFORPWD SFO

## 2022-07-08 NOTE — THERAPY EVALUATION
Alethea Moss  : 1945  Primary: Chadd Sadler Ppo  Secondary:  [address] @ [address] [place] [address]  Phone: [phone]  Fax: [phone] Plan Frequency: 2 times a week for 12 weeks    Plan of Care/Certification Expiration Date: 22 (start of plan of care 2022)      PT Visit Info:    No data recorded    OUTPATIENT PHYSICAL THERAPY:OP NOTE TYPE: Initial Assessment 2022               Episode  Appt Desk         Treatment Diagnosis: Pain in left shoulder (M25.512)  Incomplete rotator cuff tear or rupture of left shoulder, not specified as traumatic (M75.112)  Bicipital tendinitis, left shoulder (M75.22)  Impingement syndrome of left shoulder (M75.42)  Medical/Referring Diagnosis:  Incomplete rotator cuff tear or rupture of left shoulder, not specified as traumatic [M75.112]  Bicipital tendinitis, left shoulder [M75.22]  Impingement syndrome of left shoulder [M75.42]  Referring Physician:  Leonor Mosher MD MD Orders:  PT Eval and Treat   Physician Guidelines:  Weeks 0 - 1 (2022 - 2022): Sling with abduction pillow at all times except while showering and completing exercises; Full wrist and elbow ROM; Scapular exercises   Weeks 2 - 6 (2022 - 8/10/2022): D/C abduction pillow at 4 weeks. May begin weaning out of sling completely between 4 and 6 weeks unless otherwise specified; Forward flexion: 0-90°, external rotation: 0-30° -All ROM is passive; Progress to full by week 6; May begin isometric strengthening at week 6   Weeks 7 - 12 (2022- 2022): Full PROM in all directions; Progression of AAROM and AROM as tolerated ; May progress cuff strengthening advancing to dynamic strengthening; Scapular stabilization and proprioceptive exercises beginning week 10   Months 3+ (after 2022):  Light plyometrics may begin at month 3 or 4; Sport specific/functional exercises at month 4   Return MD Appt: 7/19/2022  Date of Onset:  Onset Date: 07/06/22 (post op)   s/p L shoulder rotator cuff repair (supraspinatus), distal clavicle resection, subacromial decompression, debridement (labrum, glenohumeral capsule, and biceps tenotomy)  Allergies:  Pravastatin  Restrictions/Precautions:    Restrictions/Precautions: Surgical protocol  No data recorded   Medications Last Reviewed:  7/8/2022     SUBJECTIVE   History of Injury/Illness (Reason for Referral):   Mr. Valerie Desai is a 77 y.o. male presenting to physical therapy with complaints of L shoulder pain and stiffness s/p L shoulder rotator cuff repair (supraspinatus), distal clavicle resection, subacromial decompression, and extensive debridement (labrum, glenohumeral capsule, and biceps tenotomy) on 7/6/2022. Patient rates his pain about 3/10 currently describing a dull/ aching sensation gross L shoulder. He reports pain up to 6/10 with motion and was advised to try not to actively move his arm. He has been taking pain medication as needed and using ice at home. Patient reports being eager to sleep better and trying to sleep in his bed due to improved comfort compared to a recliner. Spoke with patient about positioning and propping with pillows and advised to continue wearing sling at all times except for exercise and bathing. Patient seems motivated and eager to return to his prior level of function and independence. Patient presents with increased pain, decreased strength, decreased ROM, decreased flexibility, impaired gait, impaired posture, impaired overhead reach, impaired transfer ability, decreased activity tolerance, and overall impaired functional mobility. Patient Stated Goal(s):   \"To use my arm normally and play golf\"  Initial:     3/10 Post Session:     2/10  Past Medical History/Comorbidities:   Mr. Valerie Desai  has a past medical history of Arthritis, Hyperlipidemia, Hypertension, and Type II or unspecified type diabetes mellitus without mention of complication, not stated as uncontrolled. Mr. Kevin Jose  has a past surgical history that includes orthopedic surgery; pr abdomen surgery proc unlisted; orthopedic surgery (03/2013); heent (11/2014); heent; Limbal stem cell transplant (Left); Colonoscopy; and Shoulder arthroscopy (Left, 7/6/2022). Social History/Living Environment:   Type of Home: House  Bathroom Shower/Tub: Tub/Shower unit; Walk-in shower     Prior Level of Function/Work/Activity:   Prior level of function: Independent  Occupation: Retired  Leisure & Hobbies: golf and wood working  No data [id number] recorded   Learning:   Does the patient/guardian have any barriers to learning?: No barriers  Will there be a co-learner?: No  What is the preferred language of the patient/guardian?: English  Is an  required?: No  How does the patient/guardian prefer to learn new concepts?: Listening; Reading; Demonstration     Fall Risk Scale: Total Score: 15  Cordon Fall Risk: Low (0-24)     Dominant Side:  right handed        OBJECTIVE     Patient denies  any headaches, changes in vision, dizziness, vertigo, nausea, drop attacks, black outs, tinnitus, dysphagia, dysarthria, LE symptoms or bowel/bladder dysfunction. Observation/Orthostatic Postural Assessment:          Patient with abduction sling don L UE- adjusted for appropriate fit. Forward head, rounded shoulders, L UE held in adduction with scapular elevation and protraction noted. Post op dressing removed and steri- strips intact. Surrounding skin was wiped with alcohol wipe to remove ink and adhesive as best as possible. Skin intact and incisions sites healing well, no signs/ symptoms of infection/ DVT noted. Steri-strips covered with tegaderm and patient advised to keep covering intact until next MD visit. Palpation:          Moderate tenderness to light palpation of gross L shoulder as expected following above listed surgical procedure.  Mild warmth and edema noted- no signs/ symptoms of infection or DVT noted.    ROM:          NT = not tested  AROM/ PROM Left (degrees) Right (degrees)   Shoulder Flexion 125 165   Shoulder Abduction 80 165   Shoulder Internal Rotation  65 80   Functional Internal Rotation NT NT   Shoulder External Rotation 50 85   Functional External Rotation NT NT     Strength: Motion Tested Left   (*/5) Right  (*/5)   Shoulder Flexion NT 5   Scaption NT 5   Shoulder Abduction NT 5   Shoulder Internal Rotation  NT 5   Shoulder External Rotation NT 5   Elbow Flexion NT 5   Elbow Extension NT 5     Special Tests:         Negative UE jean's sign for DVT. No other special tests performed due to acuity of surgery. Passive Accessory Motion:         None performed due to acuity of surgery- will assess as needed. Neurological Screen:              Myotomes: Key muscle strength testing through R UE is [facility]. Dermatomes: Sensation to light touch for bilateral UE is intact from C4 to T2. Reflexes: not tested    Functional Mobility:         Patient requires assistance for dressing/ bathing. Unable to return to driving. Difficulty sleeping, but has returned to his bed instead of the recliner due to comfort. Unable to return to hobbies including golf and wood working. Balance:          Sitting and standing balance grossly intact. ASSESSMENT   Initial Assessment:  Mr. Yayo Cordon is a 77 y.o. male presenting to physical therapy with complaints of L shoulder pain and stiffness s/p L shoulder rotator cuff repair (supraspinatus), distal clavicle resection, subacromial decompression, and extensive debridement (labrum, glenohumeral capsule, and biceps tenotomy) on 7/6/2022. Patient rates his pain about 3/10 currently describing a dull/ aching sensation gross L shoulder. He reports pain up to 6/10 with motion and was advised to try not to actively move his arm. He has been taking pain medication as needed and using ice at home.  Patient reports being eager to sleep better and trying to sleep in his bed due to improved comfort compared to a recliner. Spoke with patient about positioning and propping with pillows and advised to continue wearing sling at all times except for exercise and bathing. Patient seems motivated and eager to return to his prior level of function and independence. Patient presents with increased pain, decreased strength, decreased ROM, decreased flexibility, impaired gait, impaired posture, impaired overhead reach, impaired transfer ability, decreased activity tolerance, and overall impaired functional mobility. Patient is a good candidate for skilled physical therapy interventions to include manual therapy, therapeutic exercise, transfer training, postural re-education, body mechanics training, and pain modalities as needed . Problem List: (Impacting functional limitations): Body Structures, Functions, Activity Limitations Requiring Skilled Therapeutic Intervention: Decreased functional mobility ; Decreased ADL status; Decreased ROM; Decreased body mechanics; Decreased tolerance to work activity; Decreased strength; Decreased endurance; Decreased coordination; Increased pain; Decreased posture     Therapy Prognosis:   Therapy Prognosis: Good     Assessment Complexity:   Low Complexity  PLAN   Effective Dates: 7/8/2022 TO Plan of Care/Certification Expiration Date: 09/30/22 (start of plan of care 7/8/2022)     Frequency/Duration: Plan Frequency: 2 times a week for 12 weeks     Interventions Planned (Treatment may consist of any combination of the following):    Current Treatment Recommendations: Strengthening; ROM; Functional mobility training; Transfer training; ADL/Self-care training; Neuromuscular re-education; Manual Therapy - Soft Tissue Mobilization; Pain management; Return to work related activity; Home exercise program; Safety education & training; Modalities; Positioning; Integrated dry needling;  Therapeutic activities     GOALS: (Goals have been discussed and agreed upon with patient.)  Short-Term Functional Goals: Time Frame: 7/8/2022  to 8/15/2022  1. Patient demonstrates independence with home exercise program without verbal cueing provided by therapist.   2. Patient will report no more than 1/10 L shoulder pain at rest in order to demonstrate improved self pain control and tolerance. 3. Patient will be educated in and demonstrate improved upright posture including decreased forward head and scapular protraction to improve clearance for overhead activities. 4. Patient will be educated in use of pillows and safe sleeping positions in order to improve sleeping pattern for health and wellness. Discharge Goals: Time Frame: 7/8/2022  to 9/30/2022  1. Patient will improve gross L shoulder elevation ROM to at least 155 degrees in order to improve overhead reach. 2. Patient will improve L shoulder functional internal and external ROM to at least L5 and C7, respectively, in order to improve ability to dress and bathe. 3. Patient will improve gross L shoulder strength to at least 4/5 in order to demonstrate progress towards full ROM and prior hobbies. 4. Patient will be able to gradually return to golf and wood working with no complaints of pain in order to progress back to prior level of activities. 5. Patient will improve Disability of the Arm, Shoulder, and Hand score to 17/55 from 34/55. Outcome Measure: Tool Used: Disabilities of the Arm, Shoulder and Hand (DASH) Questionnaire - Quick Version  Score:  Initial: 34/55  Most Recent: X/55 (Date: -- )   Interpretation of Score: The DASH is designed to measure the activities of daily living in person's with upper extremity dysfunction or pain. Each section is scored on a 1-5 scale, 5 representing the greatest disability. The scores of each section are added together for a total score of 55.         Medical Necessity:    Patient is expected to demonstrate progress in strength, range of motion, coordination and functional technique to increase independence with dressing and bathing and improve safety during reaching, lifting, and overhead motions.  Skilled intervention continues to be required due to L shoulder pain and stiffness s/p surgery. Reason For Services/Other Comments:   Patient continues to require skilled intervention due to decreased strength, ROM, and functional moblity of L shoulder hindering return to prior level of activities and independence. Total Duration:  Time In: 0905  Time Out: 1002    Regarding [facility]'s therapy, I certify that the treatment plan above will be carried out by a therapist or under their direction.   Thank you for this referral,  Alivia Helton, PT     Referring Physician Signature: Paulino Meadows MD _______________________________ Date _____________        Post Session Pain  Charge Capture  PT Visit Info  POC Link  Treatment Note Link  MD Guidelines  MyChar

## 2022-07-12 ENCOUNTER — HOSPITAL ENCOUNTER (OUTPATIENT)
Dept: PHYSICAL THERAPY | Age: 77
Setting detail: RECURRING SERIES
Discharge: HOME | End: 2022-07-15
Payer: MEDICARE

## 2022-07-12 PROCEDURE — 97140 MANUAL THERAPY 1/> REGIONS: CPT

## 2022-07-12 PROCEDURE — 97016 VASOPNEUMATIC DEVICE THERAPY: CPT

## 2022-07-12 PROCEDURE — 97110 THERAPEUTIC EXERCISES: CPT

## 2022-07-12 ASSESSMENT — PAIN SCALES - GENERAL: PAINLEVEL_OUTOF10: 4

## 2022-07-12 NOTE — PROGRESS NOTES
Elly Quiroz  : 1945  Primary: Rama Greg Ppo  Secondary:  Jude Mitchell @ [address] [facility] [address]  Phone: [phone]  Fax: [phone] Plan Frequency: 2 times a week for 12 weeks    Plan of Care/Certification Expiration Date: 22 (start of plan of care 2022)      PT Visit Info:   No data recorded    OUTPATIENT PHYSICAL THERAPY:OP NOTE TYPE: Treatment Note 2022       Episode  }Appt Desk              Treatment Diagnosis: Pain in left shoulder (M25.512)  Incomplete rotator cuff tear or rupture of left shoulder, not specified as traumatic (M75.112)  Bicipital tendinitis, left shoulder (M75.22)  Impingement syndrome of left shoulder (M75.42)  Medical/Referring Diagnosis:  No admission diagnoses are documented for this encounter. Referring Physician:  Emory Pimentel MD MD Orders:  PT Eval and Treat   Physician Guidelines:  Weeks 0 - 1 (2022 - 2022): Sling with abduction pillow at all times except while showering and completing exercises; Full wrist and elbow ROM; Scapular exercises   Weeks 2 - 6 (2022 - 8/10/2022): D/C abduction pillow at 4 weeks. May begin weaning out of sling completely between 4 and 6 weeks unless otherwise specified; Forward flexion: 0-90°, external rotation: 0-30° -All ROM is passive; Progress to full by week 6; May begin isometric strengthening at week 6   Weeks 7 - 12 (2022- 2022): Full PROM in all directions; Progression of AAROM and AROM as tolerated ; May progress cuff strengthening advancing to dynamic strengthening; Scapular stabilization and proprioceptive exercises beginning week 10   Months 3+ (after 2022):  Light plyometrics may begin at month 3 or 4; Sport specific/functional exercises at month 4   Return MD Appt:  2022  Date of Onset:  Onset Date: 22 (post op)     Allergies:   Pravastatin  Restrictions/Precautions:  Restrictions/Precautions: Surgical protocol  No data recorded   Interventions Planned (Treatment may consist of any combination of the following):    Current Treatment Recommendations: Strengthening; ROM; Functional mobility training; Transfer training; ADL/Self-care training; Neuromuscular re-education; Manual Therapy - Soft Tissue Mobilization; Pain management; Return to work related activity; Home exercise program; Safety education & training; Modalities; Positioning; Integrated dry needling; Therapeutic activities     Subjective Comments:  Pt. reported having increased pain with certain movements   Initial:}    4/10Post Session:       3/10  Medications Last Reviewed:  7/12/2022  Updated Objective Findings:  See evaluation note from today  Treatment   THERAPEUTIC EXERCISE: (25  minutes):    Exercises per grid below to improve mobility, strength, coordination and dynamic movement of left shoulder to improve functional lifting, carrying, reaching and overhead activites. Required moderate visual, verbal, manual and tactile cues to promote proper body alignment, promote proper body posture and promote proper body mechanics. Progressed resistance, range, repetitions and complexity of movement as indicated. Date:  7/8/2022 Date:  7/12/22 Date:     Activity/Exercise Parameters Parameters Parameters   Scapular retractions X 10 X 20 reps    Shrugs X 10 X 20 reps    Elbow flexion/ extension X 10 X 20 reps     Wrist flexion/ extension X 10 X 20 reps    Pronation/ supination X 10 X 20 reps     Open/ close hand X 10 X 20 reps     Pendulums   20 reps standing at plinth in all 4 directions                       [employer] Access Code: [email or web address]    Time spent with patient reviewing proper muscle recruitment and technique with exercises.      MANUAL THERAPY: (15 minutes):   Joint mobilization and Soft tissue mobilization was utilized and necessary because of the patient's restricted joint motion, painful spasm, loss of articular motion and restricted motion of soft tissue. · Supine PROM all directions L shoulder within physician guidelines  · Gentle glenohumeral oscillations in neutral to promote relaxation and decrease pain    Post op dressing removed and steri- strips covered with tegaderm    MODALITIES: (15  minutes):        vasopneumatic compression device to L shoulder with patient in sitting to decrease pain and edema     HEP: As above; handouts given to patient for all exercises. Treatment/Session Summary:    · Treatment Assessment Pt was compliant with all exercises and PROM as per guidelines. · Communication/Consultation:  Spoke with patient in regards to use of sling, progression of care, surgical guidelines, plan of care, and use of ice. .  · Equipment provided today:  Patient emailed above exercises for HEP. · Recommendations/Intent for next treatment session: Next visit will focus on pain and edema control, manual therapy and modalities as needed, progression of ROM, postural exercises, and strengthening per MD guidelines.     Total Treatment Billable Duration:  55 mins:[id number]}  Time In: 1000  Time Out: 1100    CORNELIA DELEON PTA       Charge Capture  }Post Session Pain  PT Visit Info  [employer] Portal  MD Guidelines  Scanned Media  Benefits  MyChart    Future Appointments   Date Time Provider Ramone Andrade   7/14/2022 11:00 AM Savage Counts, PTA [facility] [place]   7/19/2022  8:00 AM Ciara Svitlana, PT [facility]   7/19/2022  9:30 AM Zak Ohs, APRN - CNP [facility]   7/21/2022 10:00 AM Savage Counts, PTA [facility]   7/26/2022 10:00 AM Ciara Svitlana, PT [facility]   7/28/2022 10:00 AM Ciara Svitlana, PT [facility]   8/2/2022 10:00 AM Ciara Svitlana, PT [facility]   8/4/2022 10:00 AM Savage Counts, PTA [facility]   8/9/2022 10:00 AM Ciara Svitlana, PT [facility]   8/11/2022 10:00 AM Savage Counts, PTA [facility]   8/15/2022 10:45 AM FPA MISCELLANEOUS [facility]   8/16/2022 10:00 AM Ciara Svitlana, PT [facility]   8/18/2022  8:00 PM Cornelia Camilo Mgcee, PTA [facility]   8/23/2022 10:00 AM Everrett Toledo, PT [facility]   8/25/2022 10:00 AM Colton Mendez, PTA [facility]   8/26/2022  9:30 AM Naomi Houser MD [facility]   8/30/2022 10:00 AM Colton Mendez, PTA [facility]   9/1/2022 10:00 AM Eversuziet Paris, PT [facility]   9/6/2022 10:00 AM Colton Mendez, PTA [facility]   9/8/2022 10:00 AM Everrett Toledo, PT [facility]   9/13/2022 10:00 AM Everrett Toledo, PT [facility]   9/15/2022 10:00 AM Colton Mendez, PTA [facility]   9/20/2022 10:00 AM Oscar Davison, PTA [facility]   9/22/2022 10:00 AM Colton Mendez, PTA [facility]   9/27/2022 10:00 AM Colton Mendez, PTA [facility]   9/29/2022 10:00 AM Everrett Toledo, PT [facility]

## 2022-07-14 ENCOUNTER — HOSPITAL ENCOUNTER (OUTPATIENT)
Dept: PHYSICAL THERAPY | Age: 77
Setting detail: RECURRING SERIES
Discharge: HOME | End: 2022-07-17
Payer: MEDICARE

## 2022-07-14 PROCEDURE — 97016 VASOPNEUMATIC DEVICE THERAPY: CPT

## 2022-07-14 PROCEDURE — 97140 MANUAL THERAPY 1/> REGIONS: CPT

## 2022-07-14 PROCEDURE — 97110 THERAPEUTIC EXERCISES: CPT

## 2022-07-14 ASSESSMENT — PAIN SCALES - GENERAL: PAINLEVEL_OUTOF10: 0

## 2022-07-14 NOTE — PROGRESS NOTES
Eveline Harry  : 1945  Primary:   Secondary:  [address] @ [address]  [address]  Phone: [phone]  Fax: [phone] Plan Frequency: 2 times a week for 12 weeks    Plan of Care/Certification Expiration Date: 22 (start of plan of care 2022)      PT Visit Info:   No data recorded    OUTPATIENT PHYSICAL THERAPY:OP NOTE TYPE: Treatment Note 2022       Episode  }Appt Desk              Treatment Diagnosis: Pain in left shoulder (M25.512)  Incomplete rotator cuff tear or rupture of left shoulder, not specified as traumatic (M75.112)  Bicipital tendinitis, left shoulder (M75.22)  Impingement syndrome of left shoulder (M75.42)  Medical/Referring Diagnosis:  No admission diagnoses are documented for this encounter. Referring Physician:  Jose Antonio Hoyt MD MD Orders:  PT Eval and Treat   Physician Guidelines:  Weeks 0 - 1 (2022 - 2022): Sling with abduction pillow at all times except while showering and completing exercises; Full wrist and elbow ROM; Scapular exercises   Weeks 2 - 6 (2022 - 8/10/2022): D/C abduction pillow at 4 weeks. May begin weaning out of sling completely between 4 and 6 weeks unless otherwise specified; Forward flexion: 0-90°, external rotation: 0-30° -All ROM is passive; Progress to full by week 6; May begin isometric strengthening at week 6   Weeks 7 - 12 (2022- 2022): Full PROM in all directions; Progression of AAROM and AROM as tolerated ; May progress cuff strengthening advancing to dynamic strengthening; Scapular stabilization and proprioceptive exercises beginning week 10   Months 3+ (after 2022):  Light plyometrics may begin at month 3 or 4; Sport specific/functional exercises at month 4   Return MD Appt:  2022  Date of Onset:  Onset Date: 22 (post op)     Allergies:   Pravastatin  Restrictions/Precautions:  Restrictions/Precautions: Surgical protocol  No data recorded   Interventions Planned (Treatment may consist of any combination of the following):    Current Treatment Recommendations: Strengthening; ROM; Functional mobility training; Transfer training; ADL/Self-care training; Neuromuscular re-education; Manual Therapy - Soft Tissue Mobilization; Pain management; Return to work related activity; Home exercise program; Safety education & training; Modalities; Positioning; Integrated dry needling; Therapeutic activities     Subjective Comments:  Pt. reported getting better day by day   Initial:}    0/10Post Session:       0/10  Medications Last Reviewed:  7/14/2022  Updated Objective Findings:  Pt. had decreased guarding and tightness after session today and remained pain free. Treatment   THERAPEUTIC EXERCISE: (15  minutes):    Exercises per grid below to improve mobility, strength, coordination and dynamic movement of left shoulder to improve functional lifting, carrying, reaching and overhead activites. Required moderate visual, verbal, manual and tactile cues to promote proper body alignment, promote proper body posture and promote proper body mechanics. Progressed resistance, range, repetitions and complexity of movement as indicated. Date:  7/8/2022 Date:  7/12/22 Date:  7/14/22   Activity/Exercise Parameters Parameters Parameters   Scapular retractions X 10 X 20 reps x20 reps    Shrugs X 10 X 20 reps X 20 reps    Elbow flexion/ extension X 10 X 20 reps  X 20 reps    Wrist flexion/ extension X 10 X 20 reps X 20 reps    Pronation/ supination X 10 X 20 reps  X 20 reps    Open/ close hand X 10 X 20 reps  X 20 reps    Pendulums   20 reps standing at plinth in all 4 directions  X 20 reps standing at plinth in all 4 directions                     [facility] Access Code: Rochelle Levy    Time spent with patient reviewing proper muscle recruitment and technique with exercises.      MANUAL THERAPY: (15 minutes):   Joint mobilization and Soft tissue mobilization was utilized and necessary because of the patient's restricted joint motion, painful spasm, loss of articular motion and restricted motion of soft tissue. · Supine PROM all directions L shoulder within physician guidelines  · Gentle glenohumeral oscillations in neutral to promote relaxation and decrease pain    Post op dressing removed and steri- strips covered with tegaderm    MODALITIES: (15  minutes):        vasopneumatic compression device to L shoulder with patient in sitting to decrease pain and edema     HEP: As above; handouts given to patient for all exercises. Treatment/Session Summary:    · Treatment Assessment   Pt. continues to report no pain and progressing as per MD guidelines   · Communication/Consultation:  Spoke with patient in regards to use of sling, progression of care, surgical guidelines, plan of care, and use of ice. .  · Equipment provided today:  Patient emailed above exercises for HEP. · Recommendations/Intent for next treatment session: Next visit will focus on pain and edema control, manual therapy and modalities as needed, progression of ROM, postural exercises, and strengthening per MD guidelines.     Total Treatment Billable Duration:  45 mins:22460}  Time In: 1100  Time Out: 1150    JAMEY DELEON PTA       Charge Capture  }Post Session Pain  PT Visit Info  [employer] Portal  MD Guidelines  Scanned Media  Benefits  MyChart    Future Appointments   Date Time Provider Ramone Andrade   7/19/2022  8:00 AM Hyacinth Mariposa, PT [facility]   7/19/2022  9:30 AM Sergey Iqbal, APRN - CNP [facility]   7/21/2022 10:00 AM Janas Guard, PTA [facility]   7/26/2022 10:00 AM Hyacinth Mariposa, PT [facility]   7/28/2022 10:00 AM Hyacinth Mariposa, PT [facility]   8/2/2022 10:00 AM Hyacinth Mariposa, PT [facility]   8/4/2022 10:00 AM Janas Guard, PTA [facility]   8/9/2022 10:00 AM Hyacinth Biraymond, PT [facility]   8/11/2022 10:00 AM Janas Guard, PTA [facility]   8/15/2022 10:45 AM FPA MISCELLANEOUS [facility] 8/16/2022 10:00 AM Cinderella Rio Grande, PT [facility]   8/18/2022  8:00 PM Litamanda Espino, PTA [facility]   8/23/2022 10:00 AM Cinderella [place], PT [facility]   8/25/2022 10:00 AM Litamanda Espino, PTA [facility]   8/26/2022  9:30 AM Matthieu Bashir MD [facility]   8/30/2022 10:00 AM Aaron Espino, PTA [facility]   9/1/2022 10:00 AM Cinderella [place], PT [facility]   9/6/2022 10:00 AM Cinderella [place], PT [facility]   9/8/2022 10:00 AM Cinderella [place], PT [facility]   9/13/2022 10:00 AM Cinderella [place], PT [facility]   9/15/2022 10:00 AM Cinderella [place], PT [facility]   9/20/2022 10:00 AM Mar An, PTA [facility]   9/22/2022 10:00 AM Aaron Espino, PTA [facility]   9/27/2022 10:00 AM Aaron Espino, PTA [facility]   9/29/2022 10:00 AM Cinderella [place] Grande, PT [facility]

## 2022-07-19 ENCOUNTER — OFFICE VISIT (OUTPATIENT)
Dept: ORTHOPEDIC SURGERY | Age: 77
End: 2022-07-19

## 2022-07-19 ENCOUNTER — HOSPITAL ENCOUNTER (OUTPATIENT)
Dept: PHYSICAL THERAPY | Age: 77
Setting detail: RECURRING SERIES
Discharge: HOME | End: 2022-07-22
Payer: MEDICARE

## 2022-07-19 DIAGNOSIS — M75.112 NONTRAUMATIC INCOMPLETE TEAR OF LEFT ROTATOR CUFF: Primary | ICD-10-CM

## 2022-07-19 DIAGNOSIS — Z09 S/P ORTHOPEDIC SURGERY, FOLLOW-UP EXAM: ICD-10-CM

## 2022-07-19 DIAGNOSIS — M75.42 IMPINGEMENT SYNDROME OF LEFT SHOULDER: ICD-10-CM

## 2022-07-19 DIAGNOSIS — M75.22 BICEPS TENDINITIS OF LEFT SHOULDER: ICD-10-CM

## 2022-07-19 PROCEDURE — 97110 THERAPEUTIC EXERCISES: CPT

## 2022-07-19 PROCEDURE — 99024 POSTOP FOLLOW-UP VISIT: CPT | Performed by: SPECIALIST/TECHNOLOGIST

## 2022-07-19 PROCEDURE — 97016 VASOPNEUMATIC DEVICE THERAPY: CPT

## 2022-07-19 PROCEDURE — 97140 MANUAL THERAPY 1/> REGIONS: CPT

## 2022-07-19 ASSESSMENT — PAIN SCALES - GENERAL: PAINLEVEL_OUTOF10: 2

## 2022-07-19 NOTE — PROGRESS NOTES
07/06/22 (post op)     Allergies:   Pravastatin  Restrictions/Precautions:  Restrictions/Precautions: Surgical protocol  No data recorded   Interventions Planned (Treatment may consist of any combination of the following):    Current Treatment Recommendations: Strengthening; ROM; Functional mobility training; Transfer training; ADL/Self-care training; Neuromuscular re-education; Manual Therapy - Soft Tissue Mobilization; Pain management; Return to work related activity; Home exercise program; Safety education & training; Modalities; Positioning; Integrated dry needling; Therapeutic activities     Subjective Comments:  Patient reports feeling better overall with some aching/ soreness throughout the day. Reports doing his exercises daily. Initial:}    2/10Post Session:       1/10  Medications Last Reviewed:  7/19/2022  Updated Objective Findings:   See MD note from today. Treatment   THERAPEUTIC EXERCISE: (15 minutes):    Exercises per grid below to improve mobility, strength, coordination and dynamic movement of left shoulder to improve functional lifting, carrying, reaching and overhead activites. Required moderate visual, verbal, manual and tactile cues to promote proper body alignment, promote proper body posture and promote proper body mechanics. Progressed resistance, range, repetitions and complexity of movement as indicated.    Date:  7/19/2022 Date:  7/12/22 Date:  7/14/22   Activity/Exercise Parameters Parameters Parameters   Scapular retractions 2 x 10 X 20 reps x20 reps    Shrugs 2 x 10 X 20 reps X 20 reps    Elbow flexion/ extension 2 x 10 X 20 reps  X 20 reps    Wrist flexion/ extension 2 x 10 X 20 reps X 20 reps    Pronation/ supination 2 x 10 X 20 reps  X 20 reps    Open/ close hand 2 x 10, red ball X 20 reps  X 20 reps    Pendulums  X 20 each direction 20 reps standing at plinth in all 4 directions  X 20 reps standing at plinth in all 4 directions                     [employer] Access Code: [email or web address]    Time spent with patient reviewing proper muscle recruitment and technique with exercises. MANUAL THERAPY: (30 minutes):   Joint mobilization and Soft tissue mobilization was utilized and necessary because of the patient's restricted joint motion, painful spasm, loss of articular motion and restricted motion of soft tissue. Supine PROM all directions L shoulder within physician guidelines  Gentle glenohumeral oscillations in neutral to promote relaxation and decrease pain  R sidelying, L scapular mobilizations all directions to improve mobility and promote proper positioning/ activation      MODALITIES: (10 minutes):        vasopneumatic compression device to L shoulder with patient in sitting to decrease pain and edema      HEP: As above; handouts given to patient for all exercises. Treatment/Session Summary:    Treatment Assessment   Patinet making good progress per MD guidelines. Good ROM noted today. Incision site healing well. Plan to continue with progression per guidelines. Seeing MD this afternoon and given a sealed MD note to take with him. Communication/Consultation:  Spoke with patient in regards to use of sling, progression of care, surgical guidelines, plan of care, and use of ice. .  Equipment provided today:  Patient emailed above exercises for HEP. Recommendations/Intent for next treatment session: Next visit will focus on pain and edema control, manual therapy and modalities as needed, progression of ROM, postural exercises, and strengthening per MD guidelines.     Total Treatment Billable Duration:  55 minutes:85925}  Time In: 0802  Time Out: 3878    Althea Park, PT       Charge Capture  }Post Session Pain  PT Visit Info  [facility]  MD Guidelines  Scanned Media  Benefits  MyChart    Future Appointments   Date Time Provider Ramone Andrade   7/19/2022  9:30 AM Figueroa Donohue APRN - CNP [facility]   7/21/2022 10:00 AM Genesis Louise PTA [facility]   7/26/2022 10:00 AM Candy Corazon, PT [facility]   7/28/2022 10:00 AM Candy Corazon, PT [facility]   8/2/2022 10:00 AM Candy Corazon, PT [facility]   8/4/2022 10:00 AM Leafy Knights, PTA [facility]   8/9/2022 10:00 AM Candy Corazon, PT [facility]   8/11/2022 10:00 AM Leafy Knights, PTA [facility]   8/15/2022 10:45 AM FPA MISCELLANEOUS [facility]   8/16/2022 10:00 AM Candy Corazon, PT [facility]   8/18/2022  8:00 PM Leafy Knights, PTA [facility]   8/23/2022 10:00 AM Candy Corazon, PT [facility]   8/25/2022 10:00 AM Leafy Knights, PTA [facility]   8/26/2022  9:30 AM Will Cooper MD [facility]   8/30/2022 10:00 AM Leafy Knights, PTA [facility]   9/1/2022 10:00 AM Candy Corazon, PT [facility]   9/6/2022 10:00 AM Candy Corazon, PT [facility]   9/8/2022 10:00 AM Candy Corazon, PT [facility]   9/13/2022 10:00 AM Candy Corazon, PT [facility]   9/15/2022 10:00 AM Candy Corazon, PT [facility]   9/20/2022 10:00 AM Susu Sanders, PTA [facility]   9/22/2022 10:00 AM Leafy Knights, PTA [facility]   9/27/2022 10:00 AM Leafy Knights, PTA [facility]   9/29/2022 10:00 AM Candy Corazon, PT [facility]

## 2022-07-19 NOTE — PROGRESS NOTES
Eloy Duke  : 1945 [facility]  [address], [address] [place] [address], [facility], [address]  [phone]   [phone]        OUTPATIENT PHYSICAL THERAPY: PHYSICIAN COMMUNICATION    REFERRING PHYSICIAN: Junie Rivas MD  Return Physician Appointment: 2022  MEDICAL/REFERRING DIAGNOSIS:  Incomplete rotator cuff tear or rupture of left shoulder, not specified as traumatic [M75.112]  Bicipital tendinitis, left shoulder [M75.22]  Impingement syndrome of left shoulder [M75.42]  ATTENDANCE: Eloy Duke has attended 4 sessions of therapy from 2022 to 2022. ASSESSMENT:DATE: 2022    PROGRESS: Eloy Duke is progressing well with physical therapy thus far with emphasis on scapular mobility and PROM per MD guidelines. Average pain throughout the day 2/10. Patient compliant with abduction sling, but eager to have pillow removed. Compliant with HEP for scapular exercises and elbow, wrist, hand mobility. L shoulder ROM today: 80 internal rotation, 50 external rotation, 75 abduction, 110 flexion. Patient progressing as expected per guidelines. 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 Please advise any specific activities or exercises you would like patient to perform or avoid. RECOMMENDATIONS: Continue therapy 2 times a week through certification period in order to progress per MD guidelines. Thank you for this referral, and please do not hesitate to contact me at the number listed above if you have any questions.     Thaddeus Holland, PT, DPT

## 2022-07-19 NOTE — PROGRESS NOTES
Name: Hua Orellana  YOB: 1945  Gender: male  MRN: [id number]    Procedure:   1) Left Shoulder Arthroscopic Rotator Cuff Repair  2)  Left shoulder arthroscopy with distal clavicle resection  3) Left Shoulder Arthroscopy with subacromial decompression  4) Left shoulder arthroscopy with extensive debridement including labrum, glenohumeral capsule, and biceps tenotomy    Surgery Date: 7/6/2022      Subjective: Returns 2 weeks status post the above procedure. He reports that his pain is well controlled and he is attending formal physical therapy which is progressing well. Physical Examination: Incisions clean dry and intact, no sign of infection. Motor and sensory intact. Mild ecchymosis in the anterior proximal shoulder. Passive elevation in the scapular plane to 100 degrees, external rotation to neutral.      Assessment:   1. Nontraumatic incomplete tear of left rotator cuff    2. Biceps tendinitis of left shoulder    3. Impingement syndrome of left shoulder    4. S/P orthopedic surgery, follow-up exam         Plan:   Doing well. Continue PT per less than 3 cm rotator cuff repair protocol. Patient will continue use of the sling until next follow-up with Dr. Sis Bonilla.

## 2022-07-21 ENCOUNTER — HOSPITAL ENCOUNTER (OUTPATIENT)
Dept: PHYSICAL THERAPY | Age: 77
Setting detail: RECURRING SERIES
Discharge: HOME | End: 2022-07-24
Payer: MEDICARE

## 2022-07-21 PROCEDURE — 97110 THERAPEUTIC EXERCISES: CPT

## 2022-07-21 PROCEDURE — 97140 MANUAL THERAPY 1/> REGIONS: CPT

## 2022-07-21 PROCEDURE — 97016 VASOPNEUMATIC DEVICE THERAPY: CPT

## 2022-07-21 ASSESSMENT — PAIN SCALES - GENERAL: PAINLEVEL_OUTOF10: 2

## 2022-07-21 NOTE — PROGRESS NOTES
Ed Romero  : 1945  Primary:   Secondary:  Gregor Meyers @ [address] [facility] [address]  Phone: [phone]  Fax: [phone] Plan Frequency: 2 times a week for 12 weeks    Plan of Care/Certification Expiration Date: 22 (start of plan of care 2022)      PT Visit Info:   No data recorded    OUTPATIENT PHYSICAL THERAPY:OP NOTE TYPE: Treatment Note 2022       Episode  }Appt Desk              Treatment Diagnosis: Pain in left shoulder (M25.512)  Incomplete rotator cuff tear or rupture of left shoulder, not specified as traumatic (M75.112)  Bicipital tendinitis, left shoulder (M75.22)  Impingement syndrome of left shoulder (M75.42)  Medical/Referring Diagnosis:  Incomplete rotator cuff tear or rupture of left shoulder, not specified as traumatic [M75.112]  Bicipital tendinitis, left shoulder [M75.22]  Impingement syndrome of left shoulder [M75.42]  Referring Physician:  Frankie Mcneal MD MD Orders:  PT Eval and Treat   Physician Guidelines:  Weeks 0 - 1 (2022 - 2022): Sling with abduction pillow at all times except while showering and completing exercises; Full wrist and elbow ROM; Scapular exercises   Weeks 2 - 6 (2022 - 8/10/2022): D/C abduction pillow at 4 weeks. May begin weaning out of sling completely between 4 and 6 weeks unless otherwise specified; Forward flexion: 0-90°, external rotation: 0-30° -All ROM is passive; Progress to full by week 6; May begin isometric strengthening at week 6   Weeks 7 - 12 (2022- 2022): Full PROM in all directions; Progression of AAROM and AROM as tolerated ; May progress cuff strengthening advancing to dynamic strengthening; Scapular stabilization and proprioceptive exercises beginning week 10   Months 3+ (after 2022):  Light plyometrics may begin at month 3 or 4; Sport specific/functional exercises at month 4   Return MD Appt:  2022  Date of Onset:  Onset Date: 07/06/22 (post op)     Allergies:   Pravastatin  Restrictions/Precautions:  Restrictions/Precautions: Surgical protocol  No data recorded   Interventions Planned (Treatment may consist of any combination of the following):    Current Treatment Recommendations: Strengthening; ROM; Functional mobility training; Transfer training; ADL/Self-care training; Neuromuscular re-education; Manual Therapy - Soft Tissue Mobilization; Pain management; Return to work related activity; Home exercise program; Safety education & training; Modalities; Positioning; Integrated dry needling; Therapeutic activities     Subjective Comments:  Pt. reported decreased pain, sleeping better, and feeling much better overall. Initial:}    2/10Post Session:       0/10  Medications Last Reviewed:  7/21/2022  Updated Objective Findings:   Pt. Was compliant with all exercises and continues to gain range in left shoulder following MD guidelines  Treatment   THERAPEUTIC EXERCISE: (15 minutes):    Exercises per grid below to improve mobility, strength, coordination and dynamic movement of left shoulder to improve functional lifting, carrying, reaching and overhead activites. Required moderate visual, verbal, manual and tactile cues to promote proper body alignment, promote proper body posture and promote proper body mechanics. Progressed resistance, range, repetitions and complexity of movement as indicated.    Date:  7/19/2022 Date:  7/21/22 Date:  7/14/22   Activity/Exercise Parameters Parameters Parameters   Scapular retractions 2 x 10 X 20 reps x20 reps    Shrugs 2 x 10 X 20 reps X 20 reps    Elbow flexion/ extension 2 x 10 X 20 reps  X 20 reps    Wrist flexion/ extension 2 x 10 X 20 reps X 20 reps    Pronation/ supination 2 x 10 X 20 reps  X 20 reps    Open/ close hand 2 x 10, red ball X 20 reps  X 20 reps    Pendulums  X 20 each direction 20 reps standing at plinth in all 4 directions  X 20 reps standing at plinth in all 4 directions [email or web address] Access Code: [email or web address]    Time spent with patient reviewing proper muscle recruitment and technique with exercises. MANUAL THERAPY: (30 minutes):   Joint mobilization and Soft tissue mobilization was utilized and necessary because of the patient's restricted joint motion, painful spasm, loss of articular motion and restricted motion of soft tissue. Supine PROM all directions L shoulder within physician guidelines  Gentle glenohumeral oscillations in neutral to promote relaxation and decrease pain  In sitting patient received soft tissue mobilization to left neck, shoulder and left UE to decrease pain and tightness      MODALITIES: (10 minutes):        vasopneumatic compression device to L shoulder with patient in sitting to decrease pain and edema      HEP: As above; handouts given to patient for all exercises. Treatment/Session Summary:    Treatment Assessment   Pt. continues to follow MD guidelines and make slow steady progress towards goals  Communication/Consultation:  Spoke with patient in regards to use of sling, progression of care, surgical guidelines, plan of care, and use of ice. .  Equipment provided today:  Patient emailed above exercises for HEP. Recommendations/Intent for next treatment session: Next visit will focus on pain and edema control, manual therapy and modalities as needed, progression of ROM, postural exercises, and strengthening per MD guidelines.     Total Treatment Billable Duration:  55 minutes:22464}  Time In: 0900  Time Out: 1000    JAMEY DELEON PTA       Charge Capture  }Post Session Pain  PT Visit Info  [employer] Portal  MD Guidelines  Scanned Media  Benefits  MyChart    Future Appointments   Date Time Provider Ramone Andrade   7/26/2022 10:00 AM Merna Saleh PT [facility]   7/28/2022 10:00 AM AKSHAT Rodriguez [facility]   8/2/2022 10:00 AM Merna Saleh PT [facility]   8/4/2022 10:00 AM LULU Moody O   8/9/2022 10:00 AM Mark DAVIES Servando, PT [facility] [place] [facility]   8/11/2022 10:00 AM Chava Woods, PTA [facility]   8/15/2022 10:45 AM FPA MISCELLANEOUS [facility]   8/16/2022 10:00 AM Temitope Elder, PT [facility]   8/18/2022  8:00 PM Chava Woods, PTA [facility]   8/23/2022 10:00 AM Ricknyasia Elder, PT [facility]   8/23/2022 11:00 AM Estefani Gamble MD [facility]   8/25/2022 10:00 AM Chava Woods, PTA [facility]   8/26/2022  9:30 AM Sergey Garcia MD [facility]   8/30/2022 10:00 AM Chava Woods, PTA [facility]   9/1/2022 10:00 AM Temitope Elder, PT [facility]   9/6/2022 10:00 AM Ricknyasia Elder, PT [facility]   9/8/2022 10:00 AM Ricka Jael, PT [facility]   9/13/2022 10:00 AM Ricknyasia Elder, PT [facility]   9/15/2022 10:00 AM Temitope Elder, PT [facility]   9/20/2022 10:00 AM Eduar Auguste, PTA [facility]   9/22/2022 10:00 AM Chava Woods, PTA [facility]   9/27/2022 10:00 AM Chava Woods, PTA [facility]   9/29/2022 10:00 AM Temitope Elder, PT [facility]

## 2022-07-26 ENCOUNTER — HOSPITAL ENCOUNTER (OUTPATIENT)
Dept: PHYSICAL THERAPY | Age: 77
Setting detail: RECURRING SERIES
Discharge: HOME | End: 2022-07-29
Payer: MEDICARE

## 2022-07-26 PROCEDURE — 97016 VASOPNEUMATIC DEVICE THERAPY: CPT

## 2022-07-26 PROCEDURE — 97140 MANUAL THERAPY 1/> REGIONS: CPT

## 2022-07-26 PROCEDURE — 97110 THERAPEUTIC EXERCISES: CPT

## 2022-07-26 ASSESSMENT — PAIN SCALES - GENERAL: PAINLEVEL_OUTOF10: 1

## 2022-07-26 NOTE — PROGRESS NOTES
Ed Romero  : 1945  Primary:   Secondary:  [address] @ [facility] [place]  [address]  Phone: [phone]  Fax: [phone] Plan Frequency: 2 times a week for 12 weeks    Plan of Care/Certification Expiration Date: 22 (start of plan of care 2022)      PT Visit Info:   No data recorded    OUTPATIENT PHYSICAL THERAPY:OP NOTE TYPE: Treatment Note 2022       Episode  }Appt Desk              Treatment Diagnosis: Pain in left shoulder (M25.512)  Incomplete rotator cuff tear or rupture of left shoulder, not specified as traumatic (M75.112)  Bicipital tendinitis, left shoulder (M75.22)  Impingement syndrome of left shoulder (M75.42)  Medical/Referring Diagnosis:  Incomplete rotator cuff tear or rupture of left shoulder, not specified as traumatic [M75.112]  Bicipital tendinitis, left shoulder [M75.22]  Impingement syndrome of left shoulder [M75.42]  Referring Physician:  Frankie Mcneal MD MD Orders:  PT Eval and Treat   Physician Guidelines:  Weeks 0 - 1 (2022 - 2022): Sling with abduction pillow at all times except while showering and completing exercises; Full wrist and elbow ROM; Scapular exercises   Weeks 2 - 6 (2022 - 8/10/2022): D/C abduction pillow at 4 weeks. May begin weaning out of sling completely between 4 and 6 weeks unless otherwise specified; Forward flexion: 0-90°, external rotation: 0-30° -All ROM is passive; Progress to full by week 6; May begin isometric strengthening at week 6   Weeks 7 - 12 (2022- 2022): Full PROM in all directions; Progression of AAROM and AROM as tolerated ; May progress cuff strengthening advancing to dynamic strengthening; Scapular stabilization and proprioceptive exercises beginning week 10   Months 3+ (after 2022):  Light plyometrics may begin at month 3 or 4; Sport specific/functional exercises at month 4   Return MD Appt:  2022  Date of Onset:  Onset Date: 07/06/22 (post op)     Allergies:   Pravastatin  Restrictions/Precautions:  Restrictions/Precautions: Surgical protocol  No data recorded   Interventions Planned (Treatment may consist of any combination of the following):    Current Treatment Recommendations: Strengthening; ROM; Functional mobility training; Transfer training; ADL/Self-care training; Neuromuscular re-education; Manual Therapy - Soft Tissue Mobilization; Pain management; Return to work related activity; Home exercise program; Safety education & training; Modalities; Positioning; Integrated dry needling; Therapeutic activities     Subjective Comments:  Patient with minimal complaints of soreness. States the MD appointment went well, but has to wear the sling and pillow at least another week or two. Initial:}    1/10Post Session:       0/10  Medications Last Reviewed:  7/26/2022  Updated Objective Findings:   Trigger point L upper trapezius  Treatment   THERAPEUTIC EXERCISE: (15 minutes):    Exercises per grid below to improve mobility, strength, coordination and dynamic movement of left shoulder to improve functional lifting, carrying, reaching and overhead activites. Required moderate visual, verbal, manual and tactile cues to promote proper body alignment, promote proper body posture and promote proper body mechanics. Progressed resistance, range, repetitions and complexity of movement as indicated.    Date:  7/19/2022 Date:  7/21/22 Date:  7/26/22   Activity/Exercise Parameters Parameters Parameters   Scapular retractions 2 x 10 X 20 reps 2 x 10   Shrugs 2 x 10 X 20 reps 2 x 10   Elbow flexion/ extension 2 x 10 X 20 reps  2 x 10   Wrist flexion/ extension 2 x 10 X 20 reps 2 x 10   Pronation/ supination 2 x 10 X 20 reps  2 x 10   Open/ close hand 2 x 10, red ball X 20 reps  2 x 10   Pendulums  X 20 each direction 20 reps standing at plinth in all 4 directions  X 20 each direction                     [employer] Access Code: [email or web address] Lui    Time spent with patient reviewing proper muscle recruitment and technique with exercises. MANUAL THERAPY: (30 minutes):   Joint mobilization and Soft tissue mobilization was utilized and necessary because of the patient's restricted joint motion, painful spasm, loss of articular motion and restricted motion of soft tissue. Supine PROM all directions L shoulder within physician guidelines  Gentle glenohumeral oscillations in neutral to promote relaxation and decrease pain  Soft tissue mobilization to left neck, shoulder and left UE to decrease pain and tightness  Sidelying L scapular mobilizations to improve mobility and positioning      MODALITIES: (10 minutes):        vasopneumatic compression device to L shoulder with patient in sitting to decrease pain and edema      HEP: As above; handouts given to patient for all exercises. Treatment/Session Summary:    Treatment Assessment   Patient making good improvements in ROM. Decreased tightness through L shoulder. Continue with progression per MD guidelines. Communication/Consultation:   reviewed HEP  Equipment provided today:  Patient emailed above exercises for HEP. Recommendations/Intent for next treatment session: Next visit will focus on pain and edema control, manual therapy and modalities as needed, progression of ROM, postural exercises, and strengthening per MD guidelines.     Total Treatment Billable Duration:  55 minutes:07864}  Time In: 1005  Time Out: [facility]       Charge Capture  }Post Session Pain  PT Visit Info  [employer] Portal  MD Guidelines  Scanned Media  Benefits  MyChart    Future Appointments   Date Time Provider Ramone Andrade   7/28/2022 10:00 AM Ajit Mariscal, PT [facility]   8/2/2022 10:00 AM Ajit Mariscal PT [facility]   8/4/2022 10:00 AM Yulia Sylvester PTA [facility]   8/9/2022 10:00 AM Ajit Mariscal PT [facility]   8/11/2022 10:00 AM Yulia Sylvester PTA [facility]   8/15/2022 10:45 AM FPA MISCELLANEOUS [facility] 8/16/2022 10:00 AM Alexx Plume, PT [facility]   8/18/2022  8:00 PM Alvena Nyhan, PTA [facility]   8/23/2022  9:00 AM Coke Plume, PT [facility]   8/23/2022 11:00 AM Shayy Mcbride MD [facility]   8/25/2022 10:00 AM Alvena Nyhan, PTA [facility]   8/26/2022  9:30 AM Dana Cates MD [facility] AM[facility]   8/30/2022 10:00 AM Alvena Nyhan, PTA [facility]   9/1/2022 10:00 AM Coke Plume, PT [facility]   9/6/2022 10:00 AM Coke Plume, PT [facility]   9/8/2022 10:00 AM Coke Plume, PT [facility]   9/13/2022 10:00 AM Alexx Plume, PT [facility]   9/15/2022 10:00 AM Coke Plume, PT [facility]   9/20/2022  8:00 PM Reji Lavender, PTA [facility]   9/23/2022 10:00 AM Alvena Nyhan, PTA [facility]   9/27/2022 10:00 AM Alvena Nyhan, PTA [facility]   9/29/2022 10:00 AM Alexx Plume, PT [facility]

## 2022-07-28 ENCOUNTER — HOSPITAL ENCOUNTER (OUTPATIENT)
Dept: PHYSICAL THERAPY | Age: 77
Setting detail: RECURRING SERIES
Discharge: HOME | End: 2022-07-31
Payer: MEDICARE

## 2022-07-28 PROCEDURE — 97140 MANUAL THERAPY 1/> REGIONS: CPT

## 2022-07-28 PROCEDURE — 97016 VASOPNEUMATIC DEVICE THERAPY: CPT

## 2022-07-28 PROCEDURE — 97110 THERAPEUTIC EXERCISES: CPT

## 2022-07-28 ASSESSMENT — PAIN SCALES - GENERAL: PAINLEVEL_OUTOF10: 3

## 2022-07-28 NOTE — PROGRESS NOTES
Ed Romero  : 1945  Primary:   Secondary:  [address] @ [address] [facility] [address]  Phone: [phone]  Fax: [phone] Plan Frequency: 2 times a week for 12 weeks    Plan of Care/Certification Expiration Date: 22 (start of plan of care 2022)      PT Visit Info:   No data recorded    OUTPATIENT PHYSICAL THERAPY:OP NOTE TYPE: Treatment Note 2022       Episode  }Appt Desk              Treatment Diagnosis: Pain in left shoulder (M25.512)  Incomplete rotator cuff tear or rupture of left shoulder, not specified as traumatic (M75.112)  Bicipital tendinitis, left shoulder (M75.22)  Impingement syndrome of left shoulder (M75.42)  Medical/Referring Diagnosis:  Incomplete rotator cuff tear or rupture of left shoulder, not specified as traumatic [M75.112]  Bicipital tendinitis, left shoulder [M75.22]  Impingement syndrome of left shoulder [M75.42]  Referring Physician:  Frankie Mcneal MD MD Orders:  PT Eval and Treat   Physician Guidelines:  Weeks 0 - 1 (2022 - 2022): Sling with abduction pillow at all times except while showering and completing exercises; Full wrist and elbow ROM; Scapular exercises   Weeks 2 - 6 (2022 - 8/10/2022): D/C abduction pillow at 4 weeks. May begin weaning out of sling completely between 4 and 6 weeks unless otherwise specified; Forward flexion: 0-90°, external rotation: 0-30° -All ROM is passive; Progress to full by week 6; May begin isometric strengthening at week 6   Weeks 7 - 12 (2022- 2022): Full PROM in all directions; Progression of AAROM and AROM as tolerated ; May progress cuff strengthening advancing to dynamic strengthening; Scapular stabilization and proprioceptive exercises beginning week 10   Months 3+ (after 2022):  Light plyometrics may begin at month 3 or 4; Sport specific/functional exercises at month 4   Return MD Appt:  2022  Date of Onset:  Onset Date: 07/06/22 (post op)     Allergies:   Pravastatin  Restrictions/Precautions:  Restrictions/Precautions: Surgical protocol  No data recorded   Interventions Planned (Treatment may consist of any combination of the following):    Current Treatment Recommendations: Strengthening; ROM; Functional mobility training; Transfer training; ADL/Self-care training; Neuromuscular re-education; Manual Therapy - Soft Tissue Mobilization; Pain management; Return to work related activity; Home exercise program; Safety education & training; Modalities; Positioning; Integrated dry needling; Therapeutic activities     Subjective Comments:   Patient with some reports of soreness in his shoulder, but feeling good overall. Initial:}    3/10Post Session:       1/10  Medications Last Reviewed:  7/28/2022  Updated Objective Findings:   Trigger point L upper trapezius  Treatment   THERAPEUTIC EXERCISE: (15 minutes):    Exercises per grid below to improve mobility, strength, coordination and dynamic movement of left shoulder to improve functional lifting, carrying, reaching and overhead activites. Required moderate visual, verbal, manual and tactile cues to promote proper body alignment, promote proper body posture and promote proper body mechanics. Progressed resistance, range, repetitions and complexity of movement as indicated.    Date:  7/28/2022 Date:  7/21/22 Date:  7/26/22   Activity/Exercise Parameters Parameters Parameters   Scapular retractions 2 x 10 X 20 reps 2 x 10   Shrugs 2 x 10 X 20 reps 2 x 10   Elbow flexion/ extension 2 x 10 X 20 reps  2 x 10   Wrist flexion/ extension 2 x 10 X 20 reps 2 x 10   Pronation/ supination 2 x 10 X 20 reps  2 x 10   Open/ close hand 2 x 10, red ball X 20 reps  2 x 10   Pendulums  X 20 each direction 20 reps standing at plinth in all 4 directions  X 20 each direction                     [employer] Access Code: Jacqueline Betancur    Time spent with patient reviewing proper muscle recruitment and technique with exercises. MANUAL THERAPY: (30 minutes):   Joint mobilization and Soft tissue mobilization was utilized and necessary because of the patient's restricted joint motion, painful spasm, loss of articular motion and restricted motion of soft tissue. Supine PROM all directions L shoulder within physician guidelines  Gentle glenohumeral oscillations in neutral to promote relaxation and decrease pain  Soft tissue mobilization to left neck, shoulder and left UE to decrease pain and tightness  Sidelying L scapular mobilizations to improve mobility and positioning      MODALITIES: (10 minutes):        vasopneumatic compression device to L shoulder with patient in sitting to decrease pain and edema      HEP: As above; handouts given to patient for all exercises. Treatment/Session Summary:    Treatment Assessment   Patient making progress with ROM per MD guidelines. Eager to get rid of the abduction pillow. Compliant with precautions. Communication/Consultation:  None today  Equipment provided today:  Patient emailed above exercises for HEP. Recommendations/Intent for next treatment session: Next visit will focus on pain and edema control, manual therapy and modalities as needed, progression of ROM, postural exercises, and strengthening per MD guidelines.     Total Treatment Billable Duration:  55 minutes:92229}  Time In: 0900  Time Out: 0958    Viraj Nogueira, PT       Charge Capture  }Post Session Pain  PT Visit Info  [employer] Portal  MD Guidelines  Scanned Media  Benefits  MyChart    Future Appointments   Date Time Provider Ramone Andrade   8/2/2022 10:00 AM Viraj Nogueira, PT [facility]   8/4/2022 10:00 AM Teodora Buchanan PTA [facility]   8/9/2022 10:00 AM Viraj Nogueira, PT [facility]   8/11/2022 10:00 AM Teodora Buchanan, PTA [facility]   8/15/2022 10:45 AM FPA MISCELLANEOUS [facility]   8/16/2022 10:00 AM Viraj Nogueira PT [facility]   8/18/2022  8:00 PM Teodora Buchanan PTA [facility]   8/23/2022  9:00 AM [place] Lilia Vargas, PT [facility]   8/23/2022 11:00 AM Kemi Suarez MD [facility]   8/25/2022 10:00 AM Valente Abebe, PTA [facility]   8/26/2022  9:30 AM Kristopher Bloom MD [facility]   8/30/2022 10:00 AM Valente Abebe, PTA [facility]   9/1/2022 10:00 AM Pj Raspberry, PT [facility]   9/6/2022 10:00 AM Pj Raspberry, PT [facility]   9/8/2022 10:00 AM Pj Raspberry, PT [facility]   9/13/2022 10:00 AM Pj Raspberry, PT [facility]   9/15/2022 10:00 AM Pj Raspberry, PT [facility]   9/20/2022  8:00 PM Leidy Nassar, PTA [facility]   9/23/2022 10:00 AM Valente Abebe, PTA [facility]   9/27/2022 10:00 AM Valente Abebe, PTA [facility]   9/29/2022 10:00 AM Pj Raspberry, PT [facility]

## 2022-08-02 ENCOUNTER — HOSPITAL ENCOUNTER (OUTPATIENT)
Dept: PHYSICAL THERAPY | Age: 77
Setting detail: RECURRING SERIES
Discharge: HOME | End: 2022-08-05
Payer: MEDICARE

## 2022-08-02 PROCEDURE — 97140 MANUAL THERAPY 1/> REGIONS: CPT

## 2022-08-02 PROCEDURE — 97110 THERAPEUTIC EXERCISES: CPT

## 2022-08-02 PROCEDURE — 97016 VASOPNEUMATIC DEVICE THERAPY: CPT

## 2022-08-02 ASSESSMENT — PAIN SCALES - GENERAL: PAINLEVEL_OUTOF10: 3

## 2022-08-02 NOTE — PROGRESS NOTES
Alethea Moss  : 1945  Primary:   Secondary:  [place] @ [address] [address]  Phone: [phone]  Fax: [phone] Plan Frequency: 2 times a week for 12 weeks    Plan of Care/Certification Expiration Date: 22 (start of plan of care 2022)      PT Visit Info:   No data recorded    OUTPATIENT PHYSICAL THERAPY:OP NOTE TYPE: Treatment Note 2022       Episode  }Appt Desk              Treatment Diagnosis: Pain in left shoulder (M25.512)  Incomplete rotator cuff tear or rupture of left shoulder, not specified as traumatic (M75.112)  Bicipital tendinitis, left shoulder (M75.22)  Impingement syndrome of left shoulder (M75.42)  Medical/Referring Diagnosis:  Incomplete rotator cuff tear or rupture of left shoulder, not specified as traumatic [M75.112]  Bicipital tendinitis, left shoulder [M75.22]  Impingement syndrome of left shoulder [M75.42]  Referring Physician:  Leonor Mosher MD MD Orders:  PT Eval and Treat   Physician Guidelines:  Weeks 0 - 1 (2022 - 2022): Sling with abduction pillow at all times except while showering and completing exercises; Full wrist and elbow ROM; Scapular exercises   Weeks 2 - 6 (2022 - 8/10/2022): D/C abduction pillow at 4 weeks. May begin weaning out of sling completely between 4 and 6 weeks unless otherwise specified; Forward flexion: 0-90°, external rotation: 0-30° -All ROM is passive; Progress to full by week 6; May begin isometric strengthening at week 6   Weeks 7 - 12 (2022- 2022): Full PROM in all directions; Progression of AAROM and AROM as tolerated ; May progress cuff strengthening advancing to dynamic strengthening; Scapular stabilization and proprioceptive exercises beginning week 10   Months 3+ (after 2022):  Light plyometrics may begin at month 3 or 4; Sport specific/functional exercises at month 4   Return MD Appt:  2022  Date of Onset:  Onset Date: 07/06/22 (post op)   s/p L shoulder rotator cuff repair (supraspinatus), distal clavicle resection, subacromial decompression, debridement (labrum, glenohumeral capsule, and biceps tenotomy)  Allergies:   Pravastatin  Restrictions/Precautions:  Restrictions/Precautions: Surgical protocol  No data recorded   Interventions Planned (Treatment may consist of any combination of the following):    Current Treatment Recommendations: Strengthening; ROM; Functional mobility training; Transfer training; ADL/Self-care training; Neuromuscular re-education; Manual Therapy - Soft Tissue Mobilization; Pain management; Return to work related activity; Home exercise program; Safety education & training; Modalities; Positioning; Integrated dry needling; Therapeutic activities     Subjective Comments:   Patient reports some generalized soreness, but feeling like is is improving daily. States he is ready to take the abduction pillow off tomorrow. Initial:}    3/10Post Session:       2/10  Medications Last Reviewed:  8/2/2022  Updated Objective Findings:   See Progress Note from today  Treatment   THERAPEUTIC EXERCISE: (25 minutes):    Exercises per grid below to improve mobility, strength, coordination and dynamic movement of left shoulder to improve functional lifting, carrying, reaching and overhead activites. Required moderate visual, verbal, manual and tactile cues to promote proper body alignment, promote proper body posture and promote proper body mechanics. Progressed resistance, range, repetitions and complexity of movement as indicated.    Date:  7/28/2022 Date:  8/2/2022 Date:  7/26/22   Activity/Exercise Parameters Parameters Parameters   Scapular retractions 2 x 10 3 x 10 2 x 10   Shrugs 2 x 10 3 x 10 2 x 10   Elbow flexion/ extension 2 x 10 3 x 10 2 x 10   Wrist flexion/ extension 2 x 10 3 x 10 2 x 10   Pronation/ supination 2 x 10 3 x 10 2 x 10   Open/ close hand 2 x 10, red ball 3 x 10 2 x 10   Pendulums  X 20 each direction --- X 20 each direction   Wand external rotation  Supine, GENTLE, 20 x 5 seconds    Seated flexion --- Big blue ball, x 20    Standing flexion --- Table, x 10      Time spent with patient reviewing proper muscle recruitment and technique with exercises. MANUAL THERAPY: (15 minutes):   Joint mobilization and Soft tissue mobilization was utilized and necessary because of the patient's restricted joint motion, painful spasm, loss of articular motion and restricted motion of soft tissue. Supine PROM all directions L shoulder within physician guidelines  Gentle glenohumeral oscillations in neutral to promote relaxation and decrease pain  Soft tissue mobilization to left neck, shoulder and left UE to decrease pain and tightness -NOT TODAY  Sidelying L scapular mobilizations to improve mobility and positioning -NOT TODAY      MODALITIES: (15 minutes):        vasopneumatic compression device to L shoulder with patient in sitting to decrease pain and edema      HEP: As above; handouts given to patient for all exercises. Treatment/Session Summary:    Treatment Assessment   Patient with some mild soreness with stretching today, but no pain reported. Good performance of new exercises today with gentle ROM. Advised patient he can remove the abduction pillow tomorrow, but be aware of his sorenss today and once pillow is removed. Communication/Consultation:   Reviewed HEP and gentle ROM progression  Equipment provided today:  Patient given updated HEP. Recommendations/Intent for next treatment session: Next visit will focus on pain and edema control, manual therapy and modalities as needed, progression of ROM, postural exercises, and strengthening per MD guidelines.     Total Treatment Billable Duration:  55 minutes:34712}  Time In: 1007  Time Out: 1105    Lilli Barrow, PT       Charge Capture  }Post Session Pain  PT Visit Info  [employer] Portal  MD Guidelines  Scanned Media  Benefits  [employer]    Future Appointments   Date Time Provider Ramone Lupe   8/4/2022 10:00 AM [place] Sayre, PTA [facility]   8/9/2022 10:00 AM Brenda Castillo, PT [facility]   8/11/2022 10:00 AM [place] Sayre, PTA [facility]   8/15/2022 10:45 AM FPA MISCELLANEOUS [facility] AM[facility]   8/16/2022 10:00 AM Brenda Castillo, PT [facility]   8/18/2022  8:00 PM [place] Say[place], PTA [facility]   8/23/2022  9:00 AM Brenda Castillo, PT [facility]   8/23/2022 11:00 AM Blanca Pacheco MD [facility] AM[facility]   8/25/2022 10:00 AM [place] Say[place], PTA [facility]   8/26/2022  9:30 AM Thuy Hernandez MD [facility]   8/30/2022 10:00 AM [place], PTA [facility]   9/1/2022 10:00 AM Brenda Castillo, PT [facility]   9/6/2022 10:00 AM Brenda Castillo, PT [facility]   9/8/2022 10:00 AM Brenda Castillo, PT [facility]   9/13/2022 10:00 AM Brenda Castillo, PT [facility]   9/15/2022 10:00 AM Brenda Castillo, PT [facility]   9/20/2022  8:00 PM Alexandru Calero, PTA [facility]   9/23/2022 10:00 AM [place] Sayre, PTA [facility]   9/27/2022 10:00 AM [place] Say[place], PTA [facility]   9/29/2022 10:00 AM Brenda Castlilo, PT [facility]

## 2022-08-02 NOTE — PROGRESS NOTES
Michael Ann  : 1945  Primary: Kermit Kellogg Ppo  Secondary:  [address] @ [address] [place] [address]  Phone: [phone]  Fax: [phone] Plan Frequency: 2 times a week for 12 weeks    Plan of Care/Certification Expiration Date: 22 (start of plan of care 2022)      PT Visit Info:    No data recorded    OUTPATIENT PHYSICAL THERAPY:OP NOTE TYPE: Progress Report 2022               Episode  Appt Desk         Treatment Diagnosis: Pain in left shoulder (M25.512)  Incomplete rotator cuff tear or rupture of left shoulder, not specified as traumatic (M75.112)  Bicipital tendinitis, left shoulder (M75.22)  Impingement syndrome of left shoulder (M75.42)  Medical/Referring Diagnosis:  Incomplete rotator cuff tear or rupture of left shoulder, not specified as traumatic [M75.112]  Bicipital tendinitis, left shoulder [M75.22]  Impingement syndrome of left shoulder [M75.42]  Referring Physician:  Tiffani Steward MD MD Orders:  PT Eval and Treat   Physician Guidelines:  Weeks 0 - 1 (2022 - 2022): Sling with abduction pillow at all times except while showering and completing exercises; Full wrist and elbow ROM; Scapular exercises   Weeks 2 - 6 (2022 - 8/10/2022): D/C abduction pillow at 4 weeks. May begin weaning out of sling completely between 4 and 6 weeks unless otherwise specified; Forward flexion: 0-90°, external rotation: 0-30° -All ROM is passive; Progress to full by week 6; May begin isometric strengthening at week 6   Weeks 7 - 12 (2022- 2022): Full PROM in all directions; Progression of AAROM and AROM as tolerated ; May progress cuff strengthening advancing to dynamic strengthening; Scapular stabilization and proprioceptive exercises beginning week 10   Months 3+ (after 2022):  Light plyometrics may begin at month 3 or 4; Sport specific/functional exercises at month 4   Return MD Appt: Shoulder Internal Rotation  NT 5   Shoulder External Rotation NT 5   Elbow Flexion NT 5   Elbow Extension NT 5     Passive Accessory Motion:         None performed due to acuity of surgery- will assess as needed. Neurological Screen:              Myotomes: Key muscle strength testing through R UE is [facility]. Dermatomes: Sensation to light touch for bilateral UE is intact from C4 to T2. Reflexes: not tested    Functional Mobility:         Patient requires assistance for dressing/ bathing. Unable to return to driving. Difficulty sleeping, but has returned to his bed instead of the recliner due to comfort. Unable to return to hobbies including golf and wood working. 8/2/2022: patient able to dress and bathe with little to no assistance. Driving short distances. Reports sleeping better on back on on R side without waking due to L shoulder. Unable to return to hobbies of golf or wood working. Balance:          Sitting and standing balance grossly intact. ASSESSMENT   Initial Assessment:  Mr. Shae Boggs is a 77 y.o. male presenting to physical therapy with complaints of L shoulder pain and stiffness s/p L shoulder rotator cuff repair (supraspinatus), distal clavicle resection, subacromial decompression, and extensive debridement (labrum, glenohumeral capsule, and biceps tenotomy) on 7/6/2022. Patient rates his pain about 3/10 currently describing a dull/ aching sensation gross L shoulder. He reports pain up to 6/10 with motion and was advised to try not to actively move his arm. He has been taking pain medication as needed and using ice at home. Patient reports being eager to sleep better and trying to sleep in his bed due to improved comfort compared to a recliner. Spoke with patient about positioning and propping with pillows and advised to continue wearing sling at all times except for exercise and bathing. Patient seems motivated and eager to return to his prior level of function and independence. Patient presents with increased pain, decreased strength, decreased ROM, decreased flexibility, impaired gait, impaired posture, impaired overhead reach, impaired transfer ability, decreased activity tolerance, and overall impaired functional mobility. Patient is a good candidate for skilled physical therapy interventions to include manual therapy, therapeutic exercise, transfer training, postural re-education, body mechanics training, and pain modalities as needed . Progress Note 8/2/2022: Patient has been seen for 8 sessions of physical therapy from 7/8/2022 to 8/2/2022. He reports feeling much better with less pain and improving with overall mobility. He is eager to be out of the abduction sling, but is very compliant with HEP and precautions. He is making good progress with ROM and progressing well per MD guidelines. He has met some of his goals and doing well. Patient should benefit from continued skilled therapy to address remaining goals and deficits. Problem List: (Impacting functional limitations): Body Structures, Functions, Activity Limitations Requiring Skilled Therapeutic Intervention: Decreased functional mobility ; Decreased ADL status; Decreased ROM; Decreased body mechanics; Decreased tolerance to work activity; Decreased strength; Decreased endurance; Decreased coordination; Increased pain; Decreased posture     Therapy Prognosis:   Therapy Prognosis: Good     PLAN   Effective Dates: 7/8/2022 TO Plan of Care/Certification Expiration Date: 09/30/22 (start of plan of care 7/8/2022)     Frequency/Duration: Plan Frequency: 2 times a week for 12 weeks     Interventions Planned (Treatment may consist of any combination of the following):    Current Treatment Recommendations: Strengthening; ROM;  Functional mobility training; Transfer training; ADL/Self-care training; Neuromuscular re-education; Manual Therapy - Soft Tissue Mobilization; Pain management; Return to work related activity; Home exercise program; Safety education & training; Modalities; Positioning; Integrated dry needling; Therapeutic activities     GOALS: (Goals have been discussed and agreed upon with patient.)  Short-Term Functional Goals: Time Frame: 7/82022  to 8/15/2022  Patient demonstrates independence with home exercise program without verbal cueing provided by therapist. -GOAL MET  Patient will report no more than 1/10 L shoulder pain at rest in order to demonstrate improved self pain control and tolerance. -GOAL MET  Patient will be educated in and demonstrate improved upright posture including decreased forward head and scapular protraction to improve clearance for overhead activities. -GOAL MET  Patient will be educated in use of pillows and safe sleeping positions in order to improve sleeping pattern for health and wellness. -GOAL MET  Discharge Goals: Time Frame: 7/8/2022  to 9/30/2022  Patient will improve gross L shoulder elevation ROM to at least 155 degrees in order to improve overhead reach. -ONGOING  Patient will improve L shoulder functional internal and external ROM to at least L5 and C7, respectively, in order to improve ability to dress and bathe. -ONGOING  Patient will improve gross L shoulder strength to at least 4/5 in order to demonstrate progress towards full ROM and prior hobbies. -ONGOING  Patient will be able to gradually return to golf and wood working with no complaints of pain in order to progress back to prior level of activities. -ONGOING  Patient will improve Disability of the Arm, Shoulder, and Hand score to 17/55 from 34/55. -ONGOING         Outcome Measure: Tool Used: Disabilities of the Arm, Shoulder and Hand (DASH) Questionnaire - Quick Version  Score:  Initial: 34/55  Most Recent: 26/55 (Date: 8/2/2022 )   Interpretation of Score: The DASH is designed to measure the activities of daily living in person's with upper extremity dysfunction or pain.   Each section is scored on a 1-5 scale, 5 representing the greatest disability. The scores of each section are added together for a total score of 55. Medical Necessity:   Patient is expected to demonstrate progress in strength, range of motion, coordination and functional technique to increase independence with dressing and bathing and improve safety during reaching, lifting, and overhead motions. Skilled intervention continues to be required due to L shoulder pain and stiffness s/p surgery. Reason For Services/Other Comments:  Patient continues to require skilled intervention due to decreased strength, ROM, and functional moblity of L shoulder hindering return to prior level of activities and independence. Total Duration:  Time In: 1007  Time Out: 6534    Regarding Thompson RIDER Valente's therapy, I certify that the treatment plan above will be carried out by a therapist or under their direction. Thank you for this referral,  Monika Grant, PT     Referring Physician Signature: Pauline Meigs, MD No Signature is Required for this note.         Post Session Pain  Charge Capture  PT Visit Info  POC Link  Treatment Note Link  MD Guidelines  [facility]

## 2022-08-04 ENCOUNTER — HOSPITAL ENCOUNTER (OUTPATIENT)
Dept: PHYSICAL THERAPY | Age: 77
Setting detail: RECURRING SERIES
Discharge: HOME | End: 2022-08-07
Payer: MEDICARE

## 2022-08-04 PROCEDURE — 97016 VASOPNEUMATIC DEVICE THERAPY: CPT

## 2022-08-04 PROCEDURE — 97140 MANUAL THERAPY 1/> REGIONS: CPT

## 2022-08-04 PROCEDURE — 97110 THERAPEUTIC EXERCISES: CPT

## 2022-08-04 ASSESSMENT — PAIN SCALES - GENERAL: PAINLEVEL_OUTOF10: 2

## 2022-08-04 NOTE — PROGRESS NOTES
Chivo De Los Santos  : 1945  Primary:   Secondary:  [address] @ [address]  [address]  Phone: [phone]  Fax: [phone] Plan Frequency: 2 times a week for 12 weeks    Plan of Care/Certification Expiration Date: 22 (start of plan of care 2022)      PT Visit Info:   No data recorded    OUTPATIENT PHYSICAL THERAPY:OP NOTE TYPE: Treatment Note 2022       Episode  }Appt Desk              Treatment Diagnosis: Pain in left shoulder (M25.512)  Incomplete rotator cuff tear or rupture of left shoulder, not specified as traumatic (M75.112)  Bicipital tendinitis, left shoulder (M75.22)  Impingement syndrome of left shoulder (M75.42)  Medical/Referring Diagnosis:  Incomplete rotator cuff tear or rupture of left shoulder, not specified as traumatic [M75.112]  Bicipital tendinitis, left shoulder [M75.22]  Impingement syndrome of left shoulder [M75.42]  Referring Physician:  Allen Batista MD MD Orders:  PT Eval and Treat   Physician Guidelines:  Weeks 0 - 1 (2022 - 2022): Sling with abduction pillow at all times except while showering and completing exercises; Full wrist and elbow ROM; Scapular exercises   Weeks 2 - 6 (2022 - 8/10/2022): D/C abduction pillow at 4 weeks. May begin weaning out of sling completely between 4 and 6 weeks unless otherwise specified; Forward flexion: 0-90°, external rotation: 0-30° -All ROM is passive; Progress to full by week 6; May begin isometric strengthening at week 6   Weeks 7 - 12 (2022- 2022): Full PROM in all directions; Progression of AAROM and AROM as tolerated ; May progress cuff strengthening advancing to dynamic strengthening; Scapular stabilization and proprioceptive exercises beginning week 10   Months 3+ (after 2022):  Light plyometrics may begin at month 3 or 4; Sport specific/functional exercises at month 4   Return MD Appt:  2022  Date of Onset:  Onset Date: 07/06/22 (post op)   s/p L shoulder rotator cuff repair (supraspinatus), distal clavicle resection, subacromial decompression, debridement (labrum, glenohumeral capsule, and biceps tenotomy)  Allergies:   Pravastatin  Restrictions/Precautions:  Restrictions/Precautions: Surgical protocol  No data recorded   Interventions Planned (Treatment may consist of any combination of the following):    Current Treatment Recommendations: Strengthening; ROM; Functional mobility training; Transfer training; ADL/Self-care training; Neuromuscular re-education; Manual Therapy - Soft Tissue Mobilization; Pain management; Return to work related activity; Home exercise program; Safety education & training; Modalities; Positioning; Integrated dry needling; Therapeutic activities     Subjective Comments:   Pt. reported increased pain with exercises  Initial:}    2/10Post Session:       2/10  Medications Last Reviewed:  8/4/2022  Updated Objective Findings:   Pt, continues to demonstrate good shoulder range of motion with PROM. Treatment   THERAPEUTIC EXERCISE: (25 minutes):    Exercises per grid below to improve mobility, strength, coordination and dynamic movement of left shoulder to improve functional lifting, carrying, reaching and overhead activites. Required moderate visual, verbal, manual and tactile cues to promote proper body alignment, promote proper body posture and promote proper body mechanics. Progressed resistance, range, repetitions and complexity of movement as indicated.    Date:  7/28/2022 Date:  8/2/2022 Date:  8/4/22   Activity/Exercise Parameters Parameters Parameters   Scapular retractions 2 x 10 3 x 10 3 x 10   Shrugs 2 x 10 3 x 10 3 x 10   Elbow flexion/ extension 2 x 10 3 x 10 3 x 10   Wrist flexion/ extension 2 x 10 3 x 10 3 x 10   Pronation/ supination 2 x 10 3 x 10 3 x 10   Open/ close hand 2 x 10, red ball 3 x 10 3 x 10   Pendulums  X 20 each direction --- X 20 each direction   Wand external rotation Supine, GENTLE, 20 x 5 seconds Supine gentle x 20 reps 5 sec hold    Seated flexion --- Big blue ball, x 20    Standing flexion --- Table, x 10      Time spent with patient reviewing proper muscle recruitment and technique with exercises. MANUAL THERAPY: (15 minutes):   Joint mobilization and Soft tissue mobilization was utilized and necessary because of the patient's restricted joint motion, painful spasm, loss of articular motion and restricted motion of soft tissue. Supine PROM all directions L shoulder within physician guidelines  Gentle glenohumeral oscillations in neutral to promote relaxation and decrease pain  Soft tissue mobilization to left neck, shoulder and left UE to decrease pain and tightness -NOT TODAY  Sidelying L scapular mobilizations to improve mobility and positioning -NOT TODAY      MODALITIES: (15 minutes):        vasopneumatic compression device to L shoulder with patient in sitting to decrease pain and edema      HEP: As above; handouts given to patient for all exercises. Treatment/Session Summary:    Treatment Assessment   No change in pain level and compliant with all exercises  Communication/Consultation:   Reviewed HEP and gentle ROM progression  Equipment provided today:  Patient given updated HEP. Recommendations/Intent for next treatment session: Next visit will focus on pain and edema control, manual therapy and modalities as needed, progression of ROM, postural exercises, and strengthening per MD guidelines.     Total Treatment Billable Duration:  55 minutes:69073}  Time In: 1000  Time Out: 1100    JAMEY DELEON PTA       Charge Capture  }Post Session Pain  PT Visit Info  [facility] [employer] Portal  MD Guidelines  Scanned Media  Benefits  MyChart    Future Appointments   Date Time Provider Ramone Andrade   8/9/2022 10:00 AM Ciro Buckley PT [facility]   8/11/2022 10:00 AM Zabrina Valdez PTA [facility]   8/15/2022 10:45 AM FPA MISCELLANEOUS [facility]PA [facility]   8/16/2022 10:00 AM Dianelys Vazquez Michael Seats, PT [facility]   8/18/2022  8:00 PM Jose Danisha, PTA [facility]   8/23/2022  9:00 AM Maryln Gulling, PT [facility]   8/23/2022 11:00 AM MD MELYSSA Best [facility]   8/25/2022 10:00 AM Jose Danisha, PTA [facility]   8/26/2022  9:30 AM Leandra Rosales MD FP [facility]   8/30/2022 10:00 AM Jose Danisha, PTA [facility]   9/1/2022 10:00 AM Maryln Gulling, PT [facility]   9/6/2022 10:00 AM Maryln Gulling, PT [facility]   9/8/2022 10:00 AM Maryln Gulling, PT [facility]   9/13/2022 10:00 AM Maryln Gulling, PT [facility]   9/15/2022 10:00 AM Maryln Gulling, PT [facility]   9/20/2022  8:00 PM Madai Del Valle, PTA [facility]   9/23/2022 10:00 AM Jose Danisha, PTA [facility]   9/27/2022 10:00 AM Jose Danisha, PTA [facility]   9/29/2022 10:00 AM Maryln Gulling, PT [facility]

## 2022-08-09 ENCOUNTER — HOSPITAL ENCOUNTER (OUTPATIENT)
Dept: PHYSICAL THERAPY | Age: 77
Setting detail: RECURRING SERIES
Discharge: HOME | End: 2022-08-12
Payer: MEDICARE

## 2022-08-09 PROCEDURE — 97110 THERAPEUTIC EXERCISES: CPT

## 2022-08-09 PROCEDURE — 97016 VASOPNEUMATIC DEVICE THERAPY: CPT

## 2022-08-09 PROCEDURE — 97140 MANUAL THERAPY 1/> REGIONS: CPT

## 2022-08-09 ASSESSMENT — PAIN SCALES - GENERAL: PAINLEVEL_OUTOF10: 1

## 2022-08-09 NOTE — PROGRESS NOTES
June Rodo  : 1945  Primary:   Secondary:  [address] @ [address] [address]  Phone: [phone]  Fax: [phone] Plan Frequency: 2 times a week for 12 weeks    Plan of Care/Certification Expiration Date: 22 (start of plan of care 2022)      PT Visit Info:   No data recorded    OUTPATIENT PHYSICAL THERAPY:OP NOTE TYPE: Treatment Note 2022       Episode  }Appt Desk              Treatment Diagnosis: Pain in left shoulder (M25.512)  Incomplete rotator cuff tear or rupture of left shoulder, not specified as traumatic (M75.112)  Bicipital tendinitis, left shoulder (M75.22)  Impingement syndrome of left shoulder (M75.42)  Medical/Referring Diagnosis:  Incomplete rotator cuff tear or rupture of left shoulder, not specified as traumatic [M75.112]  Bicipital tendinitis, left shoulder [M75.22]  Impingement syndrome of left shoulder [M75.42]  Referring Physician:  Margaret Talamantes MD MD Orders:  PT Eval and Treat   Physician Guidelines:  Weeks 0 - 1 (2022 - 2022): Sling with abduction pillow at all times except while showering and completing exercises; Full wrist and elbow ROM; Scapular exercises   Weeks 2 - 6 (2022 - 8/10/2022): D/C abduction pillow at 4 weeks. May begin weaning out of sling completely between 4 and 6 weeks unless otherwise specified; Forward flexion: 0-90°, external rotation: 0-30° -All ROM is passive; Progress to full by week 6; May begin isometric strengthening at week 6   Weeks 7 - 12 (2022- 2022): Full PROM in all directions; Progression of AAROM and AROM as tolerated ; May progress cuff strengthening advancing to dynamic strengthening; Scapular stabilization and proprioceptive exercises beginning week 10   Months 3+ (after 2022):  Light plyometrics may begin at month 3 or 4; Sport specific/functional exercises at month 4   Return MD Appt:  2022  Date of Onset:  Onset Date: 07/06/22 (post op)   s/p L shoulder rotator cuff repair (supraspinatus), distal clavicle resection, subacromial decompression, debridement (labrum, glenohumeral capsule, and biceps tenotomy)  Allergies:   Pravastatin  Restrictions/Precautions:  Restrictions/Precautions: Surgical protocol  No data recorded   Interventions Planned (Treatment may consist of any combination of the following):    Current Treatment Recommendations: Strengthening; ROM; Functional mobility training; Transfer training; ADL/Self-care training; Neuromuscular re-education; Manual Therapy - Soft Tissue Mobilization; Pain management; Return to work related activity; Home exercise program; Safety education & training; Modalities; Positioning; Integrated dry needling; Therapeutic activities     Subjective Comments:   Patient reports his overall soreness being better and being out of the sling some at home. Initial:}    1/10Post Session:       1/10  Medications Last Reviewed:  8/9/2022  Updated Objective Findings:   Patient with 65 degrees L external rotation  Treatment   THERAPEUTIC EXERCISE: (30 minutes):    Exercises per grid below to improve mobility, strength, coordination and dynamic movement of left shoulder to improve functional lifting, carrying, reaching and overhead activites. Required moderate visual, verbal, manual and tactile cues to promote proper body alignment, promote proper body posture and promote proper body mechanics. Progressed resistance, range, repetitions and complexity of movement as indicated.    Date:  8/9/2022 Date:  8/2/2022 Date:  8/4/22   Activity/Exercise Parameters Parameters Parameters   Scapular retractions 3 x 10 3 x 10 3 x 10   Shrugs Backwards shoulder rolls, 3 x 10 3 x 10 3 x 10   Elbow flexion/ extension --- 3 x 10 3 x 10   Wrist flexion/ extension --- 3 x 10 3 x 10   Pronation/ supination --- 3 x 10 3 x 10   Open/ close hand --- 3 x 10 3 x 10   Pendulums  Reviewed for HEP --- X 20 each direction   Wand external rotation Supine, 10 x 10 second hold Supine, GENTLE, 20 x 5 seconds Supine gentle x 20 reps 5 sec hold    Seated flexion Big blue ball, x 20 Big blue ball, x 20    Standing flexion Table, 2 x 10 Table, x 10    Pulley Flexion, scaption, x 6 minutes total     Isometric- flexion, abduction, internal rotation, external rotation GENTLE, 5 x 10 seconds each                   Time spent with patient reviewing proper muscle recruitment and technique with exercises. MANUAL THERAPY: (15 minutes):   Joint mobilization and Soft tissue mobilization was utilized and necessary because of the patient's restricted joint motion, painful spasm, loss of articular motion and restricted motion of soft tissue. Supine PROM all directions L shoulder within physician guidelines  Gentle glenohumeral oscillations in neutral to promote relaxation and decrease pain  Soft tissue mobilization to left neck, shoulder and left UE to decrease pain and tightness   Sidelying L scapular mobilizations to improve mobility and positioning -NOT TODAY      MODALITIES: (10 minutes):        vasopneumatic compression device to L shoulder with patient in sitting to decrease pain and edema      HEP: As above; handouts given to patient for all exercises. Treatment/Session Summary:    Treatment Assessment   Patient tolerated additional exercises today with some fatigue, but no complaints of increased pain. Making good improvements in ROM all directions. Advised patient he could go without sling at home and walking around, but wear it if he feels he needs the support or if going into a crowded area. Communication/Consultation:   Reviewed HEP and added pulley  Equipment provided today:  Patient given pulley for home. Recommendations/Intent for next treatment session: Next visit will focus on pain and edema control, manual therapy and modalities as needed, progression of ROM, postural exercises, and strengthening per MD guidelines.     Total Treatment Billable Duration:  55 minutes:65911}  Time In: 1004  Time Out: [address] [place], PT       Charge Capture  }Post Session Pain  PT Visit Info  [facility] Portal  MD Guidelines  Scanned Media  Benefits  MyChart    Future Appointments   Date Time Provider Ramone Lupe   8/11/2022 10:00 AM Cincinnaticarson Drummonds, PTA [facility]   8/15/2022 10:45 AM FPA MISCELLANEOUS [facility]PA [facility]   8/16/2022 10:00 AM Magnolia Handy, PT [facility]   8/18/2022  8:00 PM [place] Manners, PTA [facility]   8/23/2022  9:00 AM Magnolia Handy, PT [facility]   8/23/2022 11:00 AM Maria Del Carmen Bates MD [facility]   8/25/2022 10:00 AM [place] Manners, PTA [facility]   8/26/2022  9:30 AM Wendi Hodges MD [facility]   8/30/2022 10:00 AM [place] Manners, PTA [facility]   9/1/2022 10:00 AM Ajit Handy, PT [facility]   9/6/2022 10:00 AM Ajit Handy, PT [facility]   9/8/2022 10:00 AM Ajit Handy, PT [facility]   9/13/2022 10:00 AM Magnolia Handy, PT [facility]   9/15/2022 10:00 AM Ajit Handy, PT [facility]   9/20/2022  8:00 PM Kely Knight, PTA [facility]   9/23/2022 10:00 AM [place] Manners, PTA [facility]   9/27/2022 10:00 AM [place] Manners, PTA [facility]   9/29/2022 10:00 AM Magnolia Handy, PT [facility]

## 2022-08-10 ENCOUNTER — TELEPHONE (OUTPATIENT)
Dept: FAMILY MEDICINE CLINIC | Facility: CLINIC | Age: 77
End: 2022-08-10

## 2022-08-10 DIAGNOSIS — E11.9 TYPE 2 DIABETES MELLITUS WITHOUT COMPLICATION, WITHOUT LONG-TERM CURRENT USE OF INSULIN (HCC): Primary | ICD-10-CM

## 2022-08-11 ENCOUNTER — HOSPITAL ENCOUNTER (OUTPATIENT)
Dept: PHYSICAL THERAPY | Age: 77
Setting detail: RECURRING SERIES
Discharge: HOME | End: 2022-08-14
Payer: MEDICARE

## 2022-08-11 PROCEDURE — 97110 THERAPEUTIC EXERCISES: CPT

## 2022-08-11 PROCEDURE — 97016 VASOPNEUMATIC DEVICE THERAPY: CPT

## 2022-08-11 PROCEDURE — 97140 MANUAL THERAPY 1/> REGIONS: CPT

## 2022-08-11 ASSESSMENT — PAIN SCALES - GENERAL: PAINLEVEL_OUTOF10: 1

## 2022-08-11 NOTE — PROGRESS NOTES
Branden Waldron  : 1945  Primary:   Secondary:  [address] @ [address] [place] [id number]  Phone: [phone]  Fax: [phone] Plan Frequency: 2 times a week for 12 weeks    Plan of Care/Certification Expiration Date: 22 (start of plan of care 2022)      PT Visit Info:   No data recorded    OUTPATIENT PHYSICAL THERAPY:OP NOTE TYPE: Treatment Note 2022       Episode  }Appt Desk              Treatment Diagnosis: Pain in left shoulder (M25.512)  Incomplete rotator cuff tear or rupture of left shoulder, not specified as traumatic (M75.112)  Bicipital tendinitis, left shoulder (M75.22)  Impingement syndrome of left shoulder (M75.42)  Medical/Referring Diagnosis:  Incomplete rotator cuff tear or rupture of left shoulder, not specified as traumatic [M75.112]  Bicipital tendinitis, left shoulder [M75.22]  Impingement syndrome of left shoulder [M75.42]  Referring Physician:  Sha Finnegan MD MD Orders:  PT Eval and Treat   Physician Guidelines:  Weeks 0 - 1 (2022 - 2022): Sling with abduction pillow at all times except while showering and completing exercises; Full wrist and elbow ROM; Scapular exercises   Weeks 2 - 6 (2022 - 8/10/2022): D/C abduction pillow at 4 weeks. May begin weaning out of sling completely between 4 and 6 weeks unless otherwise specified; Forward flexion: 0-90°, external rotation: 0-30° -All ROM is passive; Progress to full by week 6; May begin isometric strengthening at week 6   Weeks 7 - 12 (2022- 2022): Full PROM in all directions; Progression of AAROM and AROM as tolerated ; May progress cuff strengthening advancing to dynamic strengthening; Scapular stabilization and proprioceptive exercises beginning week 10   Months 3+ (after 2022):  Light plyometrics may begin at month 3 or 4; Sport specific/functional exercises at month 4   Return MD Appt:  2022  Date of Onset:  Onset Date: 07/06/22 (post op)   s/p L shoulder rotator cuff repair (supraspinatus), distal clavicle resection, subacromial decompression, debridement (labrum, glenohumeral capsule, and biceps tenotomy)  Allergies:   Pravastatin  Restrictions/Precautions:  Restrictions/Precautions: Surgical protocol  No data recorded   Interventions Planned (Treatment may consist of any combination of the following):    Current Treatment Recommendations: Strengthening; ROM; Functional mobility training; Transfer training; ADL/Self-care training; Neuromuscular re-education; Manual Therapy - Soft Tissue Mobilization; Pain management; Return to work related activity; Home exercise program; Safety education & training; Modalities; Positioning; Integrated dry needling; Therapeutic activities     Subjective Comments:   Pt. reported no issues from new isometric exercises or abduction pillow being removed  Initial:}    1/10Post Session:        /10  Medications Last Reviewed:  8/11/2022  Updated Objective Findings:   Pt. Was compliant with new exercises without any complaints  Treatment   THERAPEUTIC EXERCISE: (15 minutes):    Exercises per grid below to improve mobility, strength, coordination and dynamic movement of left shoulder to improve functional lifting, carrying, reaching and overhead activites. Required moderate visual, verbal, manual and tactile cues to promote proper body alignment, promote proper body posture and promote proper body mechanics. Progressed resistance, range, repetitions and complexity of movement as indicated.    Date:  8/9/2022 Date:  8/11/2022 Date:  8/4/22   Activity/Exercise Parameters Parameters Parameters   Scapular retractions 3 x 10 3 x 10 3 x 10   Shrugs Backwards shoulder rolls, 3 x 10 3 x 10 3 x 10   Elbow flexion/ extension --- 3 x 10 3 x 10   Wrist flexion/ extension --- 3 x 10 3 x 10   Pronation/ supination --- 3 x 10 3 x 10   Open/ close hand --- 3 x 10 3 x 10   Pendulums  Reviewed for HEP X 20 reps each direction X 20 each direction   Wand external rotation Supine, 10 x 10 second hold Supine, GENTLE, 20 x 5 seconds Supine gentle x 20 reps 5 sec hold    Seated flexion Big blue ball, x 20 Big blue ball, x 20    Standing flexion Table, 2 x 10 Table, 3 x 10    Pulley Flexion, scaption, x 6 minutes total Flexion & acaption x 6 mins total    Isometric- flexion, abduction, internal rotation, external rotation GENTLE, 5 x 10 seconds each Gentle x 10 reps each with a 5 second hold                   Time spent with patient reviewing proper muscle recruitment and technique with exercises. MANUAL THERAPY: (30 minutes):   Joint mobilization and Soft tissue mobilization was utilized and necessary because of the patient's restricted joint motion, painful spasm, loss of articular motion and restricted motion of soft tissue. Supine PROM all directions L shoulder within physician guidelines  Gentle glenohumeral oscillations in neutral to promote relaxation and decrease pain  Soft tissue mobilization to left neck, shoulder and left UE to decrease pain and tightness   Sidelying L scapular mobilizations to improve mobility and positioning -NOT TODAY      MODALITIES: (10 minutes):        vasopneumatic compression device to L shoulder with patient in sitting to decrease pain and edema      HEP: As above; handouts given to patient for all exercises. Treatment/Session Summary:    Treatment Assessment   Pt. was compliant with all exericises and continues to progress toward goals  Communication/Consultation:   Reviewed HEP and added pulley  Equipment provided today:  Patient given pulley for home. Recommendations/Intent for next treatment session: Next visit will focus on pain and edema control, manual therapy and modalities as needed, progression of ROM, postural exercises, and strengthening per MD guidelines.     Total Treatment Billable Duration:  55 minutes:73064}  Time In: 1000  Time Out: 2302 Last Rivas PTA       Charge Capture  }Post Session Pain  PT Visit Info  [facility] Portal  MD Guidelines  Scanned Media  Benefits  MyChart    Future Appointments   Date Time Provider Ramone Andrade   8/15/2022 10:45 AM [facility]   8/16/2022 10:00 AM Jay Rodriges, PT [facility]   8/18/2022  8:00 PM Leann Hamilton, PTA [facility]   8/23/2022  9:00 AM Jay Rodriges, PT [facility]   8/23/2022 11:00 AM Francy Montague MD [facility]   8/25/2022 10:00 AM Leann Hamilton, PTA [facility]   8/26/2022  9:30 AM Estefani Guerrier MD [facility]   8/30/2022 10:00 AM Leann Hamilton, PTA [facility]   9/1/2022 10:00 AM Jay Rodriges, PT [facility]   9/6/2022 10:00 AM Jay Rodriges, PT [facility]   9/8/2022 10:00 AM Jay Rodriges, PT [facility]   9/13/2022 10:00 AM Jay Rodriges, PT [facility]   9/15/2022 10:00 AM Jay Rodriges, PT [facility]   9/20/2022  8:00 PM Nicolas Garcia, PTA [facility]   9/23/2022 10:00 AM Leann Hamilton, PTA [facility]   9/27/2022 10:00 AM Leann Hamilton, PTA [facility]   9/29/2022 10:00 AM Jay Rodriges, PT [facility]

## 2022-08-15 ENCOUNTER — NURSE ONLY (OUTPATIENT)
Dept: FAMILY MEDICINE CLINIC | Facility: CLINIC | Age: 77
End: 2022-08-15

## 2022-08-15 DIAGNOSIS — E11.9 TYPE 2 DIABETES MELLITUS WITHOUT COMPLICATION, WITHOUT LONG-TERM CURRENT USE OF INSULIN (HCC): ICD-10-CM

## 2022-08-15 LAB
ALBUMIN SERPL-MCNC: 3.6 G/DL (ref 3.2–4.6)
ALBUMIN/GLOB SERPL: 1.1 {RATIO} (ref 1.2–3.5)
ALP SERPL-CCNC: 91 U/L (ref 50–136)
ALT SERPL-CCNC: 31 U/L (ref 12–65)
ANION GAP SERPL CALC-SCNC: 4 MMOL/L (ref 7–16)
AST SERPL-CCNC: 21 U/L (ref 15–37)
BILIRUB SERPL-MCNC: 0.9 MG/DL (ref 0.2–1.1)
BUN SERPL-MCNC: 17 MG/DL (ref 8–23)
CALCIUM SERPL-MCNC: 9.3 MG/DL (ref 8.3–10.4)
CHLORIDE SERPL-SCNC: 105 MMOL/L (ref 98–107)
CHOLEST SERPL-MCNC: 122 MG/DL
CO2 SERPL-SCNC: 29 MMOL/L (ref 21–32)
CREAT SERPL-MCNC: 1.1 MG/DL (ref 0.8–1.5)
CREAT UR-MCNC: 150 MG/DL
EST. AVERAGE GLUCOSE BLD GHB EST-MCNC: 146 MG/DL
GLOBULIN SER CALC-MCNC: 3.2 G/DL (ref 2.3–3.5)
GLUCOSE SERPL-MCNC: 109 MG/DL (ref 65–100)
HBA1C MFR BLD: 6.7 % (ref 4.8–5.6)
HDLC SERPL-MCNC: 37 MG/DL (ref 40–60)
HDLC SERPL: 3.3 {RATIO}
LDLC SERPL CALC-MCNC: 50 MG/DL
MICROALBUMIN UR-MCNC: 2.02 MG/DL
MICROALBUMIN/CREAT UR-RTO: 13 MG/G
POTASSIUM SERPL-SCNC: 4.1 MMOL/L (ref 3.5–5.1)
PROT SERPL-MCNC: 6.8 G/DL (ref 6.3–8.2)
SODIUM SERPL-SCNC: 138 MMOL/L (ref 138–145)
TRIGL SERPL-MCNC: 175 MG/DL (ref 35–150)
VLDLC SERPL CALC-MCNC: 35 MG/DL (ref 6–23)

## 2022-08-16 ENCOUNTER — HOSPITAL ENCOUNTER (OUTPATIENT)
Dept: PHYSICAL THERAPY | Age: 77
Setting detail: RECURRING SERIES
Discharge: HOME | End: 2022-08-19
Payer: MEDICARE

## 2022-08-16 PROCEDURE — 97140 MANUAL THERAPY 1/> REGIONS: CPT

## 2022-08-16 PROCEDURE — 97110 THERAPEUTIC EXERCISES: CPT

## 2022-08-16 ASSESSMENT — PAIN SCALES - GENERAL: PAINLEVEL_OUTOF10: 1

## 2022-08-16 NOTE — PROGRESS NOTES
Hua Orellana  : 1945  Primary:   Secondary:  [address] @ [address] [facility] [address] [facility] [address]  Phone: [phone]  Fax: [phone] Plan Frequency: 2 times a week for 12 weeks    Plan of Care/Certification Expiration Date: 22 (start of plan of care 2022)      PT Visit Info:   No data recorded    OUTPATIENT PHYSICAL THERAPY:OP NOTE TYPE: Treatment Note 2022       Episode  }Appt Desk              Treatment Diagnosis: Pain in left shoulder (M25.512)  Incomplete rotator cuff tear or rupture of left shoulder, not specified as traumatic (M75.112)  Bicipital tendinitis, left shoulder (M75.22)  Impingement syndrome of left shoulder (M75.42)  Medical/Referring Diagnosis:  Incomplete rotator cuff tear or rupture of left shoulder, not specified as traumatic [M75.112]  Bicipital tendinitis, left shoulder [M75.22]  Impingement syndrome of left shoulder [M75.42]  Referring Physician:  Issac Tavarez MD MD Orders:  PT Eval and Treat   Physician Guidelines:  Weeks 0 - 1 (2022 - 2022): Sling with abduction pillow at all times except while showering and completing exercises; Full wrist and elbow ROM; Scapular exercises   Weeks 2 - 6 (2022 - 8/10/2022): D/C abduction pillow at 4 weeks. May begin weaning out of sling completely between 4 and 6 weeks unless otherwise specified; Forward flexion: 0-90°, external rotation: 0-30° -All ROM is passive; Progress to full by week 6; May begin isometric strengthening at week 6   Weeks 7 - 12 (2022- 2022): Full PROM in all directions; Progression of AAROM and AROM as tolerated ; May progress cuff strengthening advancing to dynamic strengthening; Scapular stabilization and proprioceptive exercises beginning week 10   Months 3+ (after 2022):  Light plyometrics may begin at month 3 or 4; Sport specific/functional exercises at month 4   Return MD Appt:  2022  Date of Onset:  Onset Date: 07/06/22 (post op)   s/p L shoulder rotator cuff repair (supraspinatus), distal clavicle resection, subacromial decompression, debridement (labrum, glenohumeral capsule, and biceps tenotomy)  Allergies:   Pravastatin  Restrictions/Precautions:  Restrictions/Precautions: Surgical protocol  No data recorded   Interventions Planned (Treatment may consist of any combination of the following):    Current Treatment Recommendations: Strengthening; ROM; Functional mobility training; Transfer training; ADL/Self-care training; Neuromuscular re-education; Manual Therapy - Soft Tissue Mobilization; Pain management; Return to work related activity; Home exercise program; Safety education & training; Modalities; Positioning; Integrated dry needling; Therapeutic activities     Subjective Comments:   Patient with some soreness in his anterior shoulder, but reports feeling good overall and being able to use his arm a little more at home. Initial:}    1 (L anterior shoulder)/10Post Session:       1/10  Medications Last Reviewed:  8/16/2022  Updated Objective Findings:   Good L shoulder flexion and external rotation ROM noted with PROM today  Treatment   THERAPEUTIC EXERCISE: (24 minutes):    Exercises per grid below to improve mobility, strength, coordination and dynamic movement of left shoulder to improve functional lifting, carrying, reaching and overhead activites. Required moderate visual, verbal, manual and tactile cues to promote proper body alignment, promote proper body posture and promote proper body mechanics. Progressed resistance, range, repetitions and complexity of movement as indicated.    Date:  8/9/2022 Date:  8/11/2022 Date:  8/16/2022   Activity/Exercise Parameters Parameters Parameters   Scapular retractions 3 x 10 3 x 10 3 x 10   Shrugs Backwards shoulder rolls, 3 x 10 3 x 10 Backwards shoulder rolls, 3 x 10   Elbow flexion/ extension --- 3 x 10 ---   Wrist flexion/ extension --- 3 x 10 ---   Pronation/ supination --- 3 x 10 ---   Open/ close hand --- 3 x 10 ---   Pendulums  Reviewed for HEP X 20 reps each direction ---   Wand external rotation Supine, 10 x 10 second hold Supine, GENTLE, 20 x 5 seconds Supine, varying angles of abduction, 20 x 10 seconds    Sitting, 10 x 10 seconds   Seated flexion Big blue ball, x 20 Big blue ball, x 20 Big blue ball, x 30   Standing flexion Table, 2 x 10 Table, 3 x 10 Wand, 2 x 10   Standing abduction --- --- Wand, 2 x 10   Pulley Flexion, scaption, x 6 minutes total Flexion & acaption x 6 mins total ---   Isometric- flexion, abduction, internal rotation, external rotation GENTLE, 5 x 10 seconds each Gentle x 10 reps each with a 5 second hold  10 x 10 seconds each   Wall slide --- --- Flexion and scaption, x 10 each           Time spent with patient reviewing proper muscle recruitment and technique with exercises. MANUAL THERAPY: (30 minutes):   Joint mobilization and Soft tissue mobilization was utilized and necessary because of the patient's restricted joint motion, painful spasm, loss of articular motion and restricted motion of soft tissue. Supine PROM all directions L shoulder within physician guidelines  Gentle glenohumeral oscillations in neutral to promote relaxation and decrease pain  Soft tissue mobilization to left neck, shoulder and left UE to decrease pain and tightness   Sidelying L scapular mobilizations to improve mobility and positioning       MODALITIES: (0 minutes):        Patient advised to ice at home as needed      HEP: As above; handouts given to patient for all exercises. Treatment/Session Summary:    Treatment Assessment   Patient making good progresss with ROM and tolerating AAROM exercises well in therapy and at home. Advised he could go without the sling as tolerated. Plan to progress ROM and strength per guidelines.   Communication/Consultation:   Patient advised he could go without the sling as long as he is safe  Equipment provided today: None today  Recommendations/Intent for next treatment session: Next visit will focus on pain and edema control, manual therapy and modalities as needed, progression of ROM, postural exercises, and strengthening per MD guidelines.     Total Treatment Billable Duration:  54 minutes:14305}  Time In: 1002  Time Out: 1059    Collette Dierks, PT       Charge Capture  }Post Session Pain  PT Visit Info  [facility] [employer] Portal  MD Guidelines  Scanned Media  Benefits  MyChart    Future Appointments   Date Time Provider Ramone Andrade   8/18/2022  8:00 PM Shaayan Rumonesimo, PTA [facility]   8/23/2022  9:00 AM Collette Dierks, PT [facility]   8/23/2022 11:00 AM MD MELYSSA Redding [facility]   8/25/2022 10:00 AM Shaune Rumps, PTA [facility]   8/26/2022  9:30 AM Jeannette Potter MD [facility]   8/30/2022 10:00 AM Shaune Rumps, PTA [facility]   9/1/2022 10:00 AM Collette Dierks, PT [facility]   9/6/2022 10:00 AM Collette Dierks, PT [facility]   9/8/2022 10:00 AM Collette Dierks, PT [facility]   9/13/2022 10:00 AM Collette Dierks, PT [facility]   9/15/2022 10:00 AM Collette Dierks, PT [facility]   9/20/2022  8:00 PM Thelbert Sandifer, PTA [facility]   9/23/2022 10:00 AM Shaune Rumps, PTA [facility]   9/27/2022 10:00 AM Shaune Rumps, PTA [facility]   9/29/2022 10:00 AM Collette Dierks, PT [facility]

## 2022-08-18 ENCOUNTER — HOSPITAL ENCOUNTER (OUTPATIENT)
Dept: PHYSICAL THERAPY | Age: 77
Setting detail: RECURRING SERIES
End: 2022-08-18
Payer: MEDICARE

## 2022-08-23 ENCOUNTER — OFFICE VISIT (OUTPATIENT)
Dept: ORTHOPEDIC SURGERY | Age: 77
End: 2022-08-23

## 2022-08-23 ENCOUNTER — HOSPITAL ENCOUNTER (OUTPATIENT)
Dept: PHYSICAL THERAPY | Age: 77
Setting detail: RECURRING SERIES
Discharge: HOME | End: 2022-08-26
Payer: MEDICARE

## 2022-08-23 DIAGNOSIS — M75.42 IMPINGEMENT SYNDROME OF LEFT SHOULDER: ICD-10-CM

## 2022-08-23 DIAGNOSIS — M75.22 BICEPS TENDINITIS OF LEFT SHOULDER: ICD-10-CM

## 2022-08-23 DIAGNOSIS — Z09 S/P ORTHOPEDIC SURGERY, FOLLOW-UP EXAM: Primary | ICD-10-CM

## 2022-08-23 DIAGNOSIS — M75.112 NONTRAUMATIC INCOMPLETE TEAR OF LEFT ROTATOR CUFF: ICD-10-CM

## 2022-08-23 PROCEDURE — 99024 POSTOP FOLLOW-UP VISIT: CPT | Performed by: ORTHOPAEDIC SURGERY

## 2022-08-23 PROCEDURE — 97110 THERAPEUTIC EXERCISES: CPT

## 2022-08-23 PROCEDURE — 97140 MANUAL THERAPY 1/> REGIONS: CPT

## 2022-08-23 ASSESSMENT — PAIN SCALES - GENERAL: PAINLEVEL_OUTOF10: 1

## 2022-08-23 NOTE — PROGRESS NOTES
Name: Michael Juarez  YOB: 1945  Gender: male  MRN: [id number]      CC: Shoulder Pain (L shoulder recheck)       HPI: Michael Juarez is a 77 y.o. male who returns for follow up on his left shoulder. He is 7 weeks s/p Left Shoulder Arthroscopic Rotator Cuff Repair with distal clavicle resection, subacromial decompression, and extensive debridement including labrum, glenohumeral capsule, and biceps tenotomy. He reports making a lot of progress in physical therapy and says he is a bit sore today since he just came from PT. Physical Examination:  General: no acute distress  Lungs: breathing easily  CV: regular rhythm by pulse  Left Shoulder: Active elevation about 140 passively close to 150. External rotation about 60. Motor and sensory intact throughout no obvious biceps deformity. Assessment:     ICD-10-CM    1. S/P orthopedic surgery, follow-up exam  Z09       2. Biceps tendinitis of left shoulder  M75.22       3. Impingement syndrome of left shoulder  M75.42       4. Nontraumatic incomplete tear of left rotator cuff  M75.112           Plan:   Progressing very well. Continue PT per protocol. We discussed appropriate precautions at this point the rehab. I will see him back in about 2 months            Wilver Ryder MD, [facility]

## 2022-08-23 NOTE — PROGRESS NOTES
Luz Maria Vazquez  : 1945 [facility]  [address], [address] [place] [address] Avenue, Brice castellanos, [address] [place] [address] Street  [phone]   [phone]        OUTPATIENT PHYSICAL THERAPY: PHYSICIAN COMMUNICATION    REFERRING PHYSICIAN: Thais Ga MD  Return Physician Appointment: 2022  MEDICAL/REFERRING DIAGNOSIS:  Incomplete rotator cuff tear or rupture of left shoulder, not specified as traumatic [M75.112]  Bicipital tendinitis, left shoulder [M75.22]  Impingement syndrome of left shoulder [M75.42]  ATTENDANCE: Luz Maria Vazquez has attended 13 sessions of therapy from 2022 to 2022. ASSESSMENT:DATE: 2022    PROGRESS: Luz Maria Vazquez has been progressing well with physical therapy with improving ROM and minimal to no pain during the day. Patient is out of his sling and doing well with his HEP. ROM today: Flex 140, Abd 95, IR 70, ER 65 degrees. Some tightness/ soreness with end range flexion as expected, but progressing. Patient performing AAROM exercises and starting some AROM per tolerance and added some light cuff strengthening without problems. Please advise any specific activities or exercises you would like patient to perform or avoid. RECOMMENDATIONS: Continue therapy 2 times a week through certification period in order to progress per guidelines back to prior functional level. Thank you for this referral, and please do not hesitate to contact me at the number listed above if you have any questions.     Jay Rodriges, PT, DPT Spoke to Dr. Freedman while in clinic today regarding results. Dr. Freedman explained she is still awaiting final results from Dr. Boo but will reach out again. RN verbalized understanding Contacted pts mother at [phone], updated mom that we are still waiting and will be in contact once Dr. Freedman advises. Mom verbalized understanding and denied questions or concerns.

## 2022-08-23 NOTE — PROGRESS NOTES
Chivo De Los Santos  : 1945  Primary:   Secondary:  [address] [facility] [address]  Phone: [phone]  Fax: [phone] Plan Frequency: 2 times a week for 12 weeks    Plan of Care/Certification Expiration Date: 22 (start of plan of care 2022)      PT Visit Info:   No data recorded    OUTPATIENT PHYSICAL THERAPY:OP NOTE TYPE: Treatment Note 2022       Episode  }Appt Desk              Treatment Diagnosis: Pain in left shoulder (M25.512)  Incomplete rotator cuff tear or rupture of left shoulder, not specified as traumatic (M75.112)  Bicipital tendinitis, left shoulder (M75.22)  Impingement syndrome of left shoulder (M75.42)  Medical/Referring Diagnosis:  Incomplete rotator cuff tear or rupture of left shoulder, not specified as traumatic [M75.112]  Bicipital tendinitis, left shoulder [M75.22]  Impingement syndrome of left shoulder [M75.42]  Referring Physician:  Allen Batista MD MD Orders:  PT Eval and Treat   Physician Guidelines:  Weeks 0 - 1 (2022 - 2022): Sling with abduction pillow at all times except while showering and completing exercises; Full wrist and elbow ROM; Scapular exercises   Weeks 2 - 6 (2022 - 8/10/2022): D/C abduction pillow at 4 weeks. May begin weaning out of sling completely between 4 and 6 weeks unless otherwise specified; Forward flexion: 0-90°, external rotation: 0-30° -All ROM is passive; Progress to full by week 6; May begin isometric strengthening at week 6   Weeks 7 - 12 (2022- 2022): Full PROM in all directions; Progression of AAROM and AROM as tolerated ; May progress cuff strengthening advancing to dynamic strengthening; Scapular stabilization and proprioceptive exercises beginning week 10   Months 3+ (after 2022):  Light plyometrics may begin at month 3 or 4; Sport specific/functional exercises at month 4   Return MD Appt:  2022  Date of Onset:  Onset Date: 07/06/22 (post op)   s/p L shoulder rotator cuff repair (supraspinatus), distal clavicle resection, subacromial decompression, debridement (labrum, glenohumeral capsule, and biceps tenotomy)  Allergies:   Pravastatin  Restrictions/Precautions:  Restrictions/Precautions: Surgical protocol  No data recorded   Interventions Planned (Treatment may consist of any combination of the following):    Current Treatment Recommendations: Strengthening; ROM; Functional mobility training; Transfer training; ADL/Self-care training; Neuromuscular re-education; Manual Therapy - Soft Tissue Mobilization; Pain management; Return to work related activity; Home exercise program; Safety education & training; Modalities; Positioning; Integrated dry needling; Therapeutic activities     Subjective Comments:   Patient with minimal complaints of pain during the day and progressing with exercises/ HEP. Had a good trip out of town without any shoulder problems. Initial:}    1/10Post Session:       1/10  Medications Last Reviewed:  8/23/2022  Updated Objective Findings:   See MD note from today    Treatment   THERAPEUTIC EXERCISE: (30 minutes):    Exercises per grid below to improve mobility, strength, coordination and dynamic movement of left shoulder to improve functional lifting, carrying, reaching and overhead activites. Required moderate visual, verbal, manual and tactile cues to promote proper body alignment, promote proper body posture and promote proper body mechanics. Progressed resistance, range, repetitions and complexity of movement as indicated.    Date:  8/22/2022 Date:  8/11/2022 Date:  8/16/2022   Activity/Exercise Parameters Parameters Parameters   Scapular retractions --- 3 x 10 3 x 10   Shrugs --- 3 x 10 Backwards shoulder rolls, 3 x 10   Elbow flexion/ extension --- 3 x 10 ---   Wrist flexion/ extension --- 3 x 10 ---   Pronation/ supination --- 3 x 10 ---   Open/ close hand --- 3 x 10 ---   Pendulums  --- X 20 reps each direction ---   Wand external rotation Supine, 10 x 10 second hold Supine, GENTLE, 20 x 5 seconds Supine, varying angles of abduction, 20 x 10 seconds    Sitting, 10 x 10 seconds   Seated flexion Big blue ball, x 20 Big blue ball, x 20 Big blue ball, x 30   Standing flexion Wand, 2 x 10 Table, 3 x 10 Wand, 2 x 10   Standing abduction Wand, 2 x 10 --- Wand, 2 x 10   Pulley Flexion, scaption, x 6 minutes total Flexion & acaption x 6 mins total ---   Isometric- flexion, abduction, internal rotation, external rotation --- Gentle x 10 reps each with a 5 second hold  10 x 10 seconds each   Wall slide Flexion and scaption, x 10 each --- Flexion and scaption, x 10 each   Band row Red, 2 x 10     Band low row Red, 2 x 10     Band internal rotation Walk outs, Yellow, 2 x 10     Band external rotation Walk outs, yellow, 2 x 10       Time spent with patient reviewing proper muscle recruitment and technique with exercises. MANUAL THERAPY: (25 minutes):   Joint mobilization and Soft tissue mobilization was utilized and necessary because of the patient's restricted joint motion, painful spasm, loss of articular motion and restricted motion of soft tissue. Supine PROM all directions L shoulder within physician guidelines  Gentle glenohumeral oscillations in neutral to promote relaxation and decrease pain  Soft tissue mobilization to left neck, shoulder and left UE to decrease pain and tightness   Sidelying L scapular mobilizations to improve mobility and positioning       MODALITIES: (0 minutes):        Patient advised to ice at home as needed      HEP: As above; handouts given to patient for all exercises. Treatment/Session Summary:    Treatment Assessment   Patient progressing well per MD guidelines. Seeing MD after visit today. Tolerated new exercises with some fatigue, but no pain. Will continue with progression as able next session.   Communication/Consultation:   Patient advised he could go without the sling as long as he is safe  Equipment provided today:  None today  Recommendations/Intent for next treatment session: Next visit will focus on pain and edema control, manual therapy and modalities as needed, progression of ROM, postural exercises, and strengthening per MD guidelines.     Total Treatment Billable Duration:  55 minutes 6392}  Time In: 0905  Time Out: 1001    Mary Kay Angle, PT       Charge Capture  }Post Session Pain  PT Visit Info  Med[facility] Portal  MD Guidelines  Scanned Media  Benefits  MyChart    Future Appointments   Date Time Provider Ramone Andrade   8/23/2022 11:00 AM Jose Luis Gutierrez MD [facility]   8/25/2022 10:00 AM Lexus Parker, PTA [facility]   8/26/2022  9:30 AM Sohail Anderson MD [facility]   8/30/2022 10:00 AM Lexus Parker, PTA [facility]   9/1/2022 10:00 AM Mary Kay Angle, PT [facility]   9/6/2022 10:00 AM Mary Kay Angle, PT [facility]   9/8/2022 10:00 AM Mary Kay Angle, PT [facility]   9/13/2022 10:00 AM Mary Kay Angle, PT [facility]   9/15/2022 10:00 AM Mary Kay Angle, PT [facility]   9/20/2022  8:00 PM Horacio Hernandez, PTA [facility]   9/23/2022 10:00 AM Lexus Parker, PTA [facility]   9/27/2022 10:00 AM Lexus Parker, PTA [facility]   9/29/2022 10:00 AM Mary Kay Angle, PT [facility]

## 2022-08-25 ENCOUNTER — HOSPITAL ENCOUNTER (OUTPATIENT)
Dept: PHYSICAL THERAPY | Age: 77
Setting detail: RECURRING SERIES
Discharge: HOME | End: 2022-08-28
Payer: MEDICARE

## 2022-08-25 PROCEDURE — 97110 THERAPEUTIC EXERCISES: CPT

## 2022-08-25 PROCEDURE — 97140 MANUAL THERAPY 1/> REGIONS: CPT

## 2022-08-25 ASSESSMENT — PAIN SCALES - GENERAL: PAINLEVEL_OUTOF10: 1

## 2022-08-25 NOTE — PROGRESS NOTES
Eveline Harry  : 1945  Primary:   Secondary:  [address] [facility] [address]  Phone: [phone]  Fax: [phone] Plan Frequency: 2 times a week for 12 weeks    Plan of Care/Certification Expiration Date: 22 (start of plan of care 2022)      PT Visit Info:   No data recorded    OUTPATIENT PHYSICAL THERAPY:OP NOTE TYPE: Treatment Note 2022       Episode  }Appt Desk              Treatment Diagnosis: Pain in left shoulder (M25.512)  Incomplete rotator cuff tear or rupture of left shoulder, not specified as traumatic (M75.112)  Bicipital tendinitis, left shoulder (M75.22)  Impingement syndrome of left shoulder (M75.42)  Medical/Referring Diagnosis:  Incomplete rotator cuff tear or rupture of left shoulder, not specified as traumatic [M75.112]  Bicipital tendinitis, left shoulder [M75.22]  Impingement syndrome of left shoulder [M75.42]  Referring Physician:  Jose Antonio Hoyt MD MD Orders:  PT Eval and Treat   Physician Guidelines:  Weeks 0 - 1 (2022 - 2022): Sling with abduction pillow at all times except while showering and completing exercises; Full wrist and elbow ROM; Scapular exercises   Weeks 2 - 6 (2022 - 8/10/2022): D/C abduction pillow at 4 weeks. May begin weaning out of sling completely between 4 and 6 weeks unless otherwise specified; Forward flexion: 0-90°, external rotation: 0-30° -All ROM is passive; Progress to full by week 6; May begin isometric strengthening at week 6   Weeks 7 - 12 (2022- 2022): Full PROM in all directions; Progression of AAROM and AROM as tolerated ; May progress cuff strengthening advancing to dynamic strengthening; Scapular stabilization and proprioceptive exercises beginning week 10   Months 3+ (after 2022):  Light plyometrics may begin at month 3 or 4; Sport specific/functional exercises at month 4   Return MD Appt:  2022  Date of Onset:  Onset Date: Supine, varying angles of abduction, 20 x 10 seconds    Sitting, 10 x 10 seconds   Seated flexion Big blue ball, x 20 Big blue ball, x 20 Big blue ball, x 30   Standing flexion Wand, 2 x 10  Wand, 2 x 10   Standing abduction Wand, 2 x 10 --- Wand, 2 x 10   Pulley Flexion, scaption, x 6 minutes total Flexion & acaption x 6 mins total ---   Isometric- flexion, abduction, internal rotation, external rotation --- Gentle x 10 reps each with a 5 second hold  10 x 10 seconds each   Wall slide Flexion and scaption, x 10 each Flexion and scaption, x 10 each Flexion and scaption, x 10 each   Band row Red, 2 x 10 Red 2x10    Band low row Red, 2 x 10 Red 2x10     Band internal rotation Walk outs, Yellow, 2 x 10 Walk outs, yellow band 2x10 reps     Band external rotation Walk outs, yellow, 2 x 10 Walk outs, yellow, 2x10       Time spent with patient reviewing proper muscle recruitment and technique with exercises. MANUAL THERAPY: (25 minutes):   Joint mobilization and Soft tissue mobilization was utilized and necessary because of the patient's restricted joint motion, painful spasm, loss of articular motion and restricted motion of soft tissue. Supine PROM all directions L shoulder within physician guidelines  Gentle glenohumeral oscillations in neutral to promote relaxation and decrease pain  Soft tissue mobilization to left neck, shoulder and left UE to decrease pain and tightness   Sidelying L scapular mobilizations to improve mobility and positioning ( not today)      MODALITIES: (8 minutes):        Pt. Received ice pack to left shoulder at the end of session for pain and swelling after exercises      HEP: As above; handouts given to patient for all exercises. Treatment/Session Summary:    Treatment Assessment Pt.  Was compliant with all exercises and reported no increased pain      Communication/Consultation:   Patient advised he could go without the sling as long as he is safe  Equipment provided today:  None

## 2022-08-26 ENCOUNTER — OFFICE VISIT (OUTPATIENT)
Dept: FAMILY MEDICINE CLINIC | Facility: CLINIC | Age: 77
End: 2022-08-26
Payer: MEDICARE

## 2022-08-26 VITALS
HEART RATE: 53 BPM | WEIGHT: 220 LBS | TEMPERATURE: 97.2 F | RESPIRATION RATE: 16 BRPM | HEIGHT: 69 IN | BODY MASS INDEX: 32.58 KG/M2 | DIASTOLIC BLOOD PRESSURE: 73 MMHG | OXYGEN SATURATION: 96 % | SYSTOLIC BLOOD PRESSURE: 150 MMHG

## 2022-08-26 DIAGNOSIS — E11.9 TYPE 2 DIABETES MELLITUS WITHOUT COMPLICATION, WITHOUT LONG-TERM CURRENT USE OF INSULIN (HCC): ICD-10-CM

## 2022-08-26 DIAGNOSIS — Z23 NEED FOR VACCINATION AGAINST STREPTOCOCCUS PNEUMONIAE: ICD-10-CM

## 2022-08-26 DIAGNOSIS — E78.00 PURE HYPERCHOLESTEROLEMIA: ICD-10-CM

## 2022-08-26 DIAGNOSIS — M19.90 OSTEOARTHRITIS, UNSPECIFIED OSTEOARTHRITIS TYPE, UNSPECIFIED SITE: ICD-10-CM

## 2022-08-26 DIAGNOSIS — Z00.00 MEDICARE ANNUAL WELLNESS VISIT, SUBSEQUENT: Primary | ICD-10-CM

## 2022-08-26 DIAGNOSIS — I10 PRIMARY HYPERTENSION: ICD-10-CM

## 2022-08-26 DIAGNOSIS — Z94.84 HX OF STEM CELL TRANSPLANT (HCC): ICD-10-CM

## 2022-08-26 PROBLEM — L72.3 SEBACEOUS CYST: Status: RESOLVED | Noted: 2018-11-01 | Resolved: 2022-08-26

## 2022-08-26 PROCEDURE — 90677 PCV20 VACCINE IM: CPT | Performed by: FAMILY MEDICINE

## 2022-08-26 PROCEDURE — 1123F ACP DISCUSS/DSCN MKR DOCD: CPT | Performed by: FAMILY MEDICINE

## 2022-08-26 PROCEDURE — 3044F HG A1C LEVEL LT 7.0%: CPT | Performed by: FAMILY MEDICINE

## 2022-08-26 PROCEDURE — 90471 IMMUNIZATION ADMIN: CPT | Performed by: FAMILY MEDICINE

## 2022-08-26 PROCEDURE — G0439 PPPS, SUBSEQ VISIT: HCPCS | Performed by: FAMILY MEDICINE

## 2022-08-26 RX ORDER — TAMSULOSIN HYDROCHLORIDE 0.4 MG/1
0.4 CAPSULE ORAL DAILY
Qty: 30 CAPSULE | Refills: 11 | Status: CANCELLED | OUTPATIENT
Start: 2022-08-26

## 2022-08-26 RX ORDER — LOSARTAN POTASSIUM AND HYDROCHLOROTHIAZIDE 25; 100 MG/1; MG/1
TABLET ORAL
Qty: 90 TABLET | Refills: 3 | Status: SHIPPED | OUTPATIENT
Start: 2022-08-26

## 2022-08-26 ASSESSMENT — PATIENT HEALTH QUESTIONNAIRE - PHQ9
SUM OF ALL RESPONSES TO PHQ QUESTIONS 1-9: 0
SUM OF ALL RESPONSES TO PHQ QUESTIONS 1-9: 0
1. LITTLE INTEREST OR PLEASURE IN DOING THINGS: 0
SUM OF ALL RESPONSES TO PHQ QUESTIONS 1-9: 0
SUM OF ALL RESPONSES TO PHQ9 QUESTIONS 1 & 2: 0
2. FEELING DOWN, DEPRESSED OR HOPELESS: 0
SUM OF ALL RESPONSES TO PHQ QUESTIONS 1-9: 0

## 2022-08-26 ASSESSMENT — LIFESTYLE VARIABLES
HOW OFTEN DO YOU HAVE A DRINK CONTAINING ALCOHOL: NEVER
HOW MANY STANDARD DRINKS CONTAINING ALCOHOL DO YOU HAVE ON A TYPICAL DAY: PATIENT DOES NOT DRINK

## 2022-08-26 NOTE — PROGRESS NOTES
Medicare Annual Wellness Visit      He did have chest have rotator cuff surgery in July and is recovering from that. He is otherwise doing well. He goes to [facility] regularly. Hua Orellana is here for Medicare AWV    Assessment & Plan   Medicare annual wellness visit, subsequent  Need for vaccination against Streptococcus pneumoniae  -     Pneumococcal, PCV20, PREVNAR 21, (age 25 yrs+), IM, PF  Hx of stem cell transplant ([facility] [address]  Type 2 diabetes mellitus without complication, without long-term current use of insulin ([facility] [address]  Primary hypertension  -     losartan-hydroCHLOROthiazide (HYZAAR) 100-25 MG per tablet; TAKE 1 TABLET DAILY, Disp-90 tablet, R-3Normal  -     metoprolol tartrate (LOPRESSOR) 25 MG tablet; TAKE 1 TABLET TWICE A DAY, Disp-180 tablet, R-3Normal  Osteoarthritis, unspecified osteoarthritis type, unspecified site  Pure hypercholesterolemia    Recommendations for Preventive Services Due: see orders and patient instructions/AVS.  Recommended screening schedule for the next 5-10 years is provided to the patient in written form: see Patient Instructions/AVS.     Return in 1 year (on 8/26/2023) for Medicare Annual Wellness Visit in 1 year. Subjective   The following acute and/or chronic problems were also addressed today:    He comes in to check on diabetes, hypertension, and high cholesterol. Checks blood sugars once a day. Sugars have been running good. Last eye exam was last year. Feet have no problems. Did  have a flu shot this year. Did have a pneumonia shot 9/12. Did have a tetanus shot 2015. Has  been working on diet and exercise. Blood pressure has been under control. Labs gone over with the patient. Patient's complete Health Risk Assessment and screening values have been reviewed and are found in Flowsheets. The following problems were reviewed today and where indicated follow up appointments were made and/or referrals ordered.     Positive Risk Factor Screenings with Interventions:             General Health and ACP:  General  In general, how would you say your health is?: Good  In the past 7 days, have you experienced any of the following: New or Increased Pain, New or Increased Fatigue, Loneliness, Social Isolation, Stress or Anger?: No  Do you get the social and emotional support that you need?: Yes  Do you have a Living Will?: (!) No    Advance Directives       Power of  Living Will ACP-Advance Directive ACP-Power of     Not on File Not on File Filed Not on File          General Health Risk Interventions:  No Living Will: He will consider living well    Health Habits/Nutrition:  Physical Activity: Insufficiently Active    Days of Exercise per Week: 1 day    Minutes of Exercise per Session: 20 min     Have you lost any weight without trying in the past 3 months?: (!) Yes  Body mass index: (!) 32.48  Have you seen the dentist within the past year?: (!) No  Health Habits/Nutrition Interventions:  Increase veggies as well as see the dentist             Objective   Vitals:    08/26/22 0957 08/26/22 1019   BP: (!) 153/66 (!) 150/73   Site:  Left Upper Arm   Pulse: 53    Resp: 16    Temp: 97.2 °F (36.2 °C)    SpO2: 96%    Weight: 220 lb (99.8 kg)    Height: 5' 9\" (1.753 m)       Body mass index is 32.49 kg/m².       Neck: neck supple and non tender without mass, no thyromegaly or thyroid nodules, no cervical lymphadenopathy   Pulmonary/Chest: clear to auscultation bilaterally- no wheezes, rales or rhonchi, normal air movement, no respiratory distress  Cardiovascular: normal rate, normal S1 and S2, and no gallops  Diabetic foot exam:   Left Foot:   Visual Exam: normal   Pulse DP: 2+ (normal)   Filament test: normal sensation   Vibratory Sensation: normal  Right Foot:   Visual Exam: normal   Pulse DP: 2+ (normal)   Filament test: normal sensation   Vibratory Sensation: normal         Allergies   Allergen Reactions    Pravastatin Diarrhea     Prior to Visit Medications

## 2022-08-26 NOTE — PATIENT INSTRUCTIONS
Personalized Preventive Plan for Emiliana Sinha - 8/26/2022  Medicare offers a range of preventive health benefits. Some of the tests and screenings are paid in full while other may be subject to a deductible, co-insurance, and/or copay. Some of these benefits include a comprehensive review of your medical history including lifestyle, illnesses that may run in your family, and various assessments and screenings as appropriate. After reviewing your medical record and screening and assessments performed today your provider may have ordered immunizations, labs, imaging, and/or referrals for you. A list of these orders (if applicable) as well as your Preventive Care list are included within your After Visit Summary for your review. Other Preventive Recommendations:    A preventive eye exam performed by an eye specialist is recommended every 1-2 years to screen for glaucoma; cataracts, macular degeneration, and other eye disorders. A preventive dental visit is recommended every 6 months. Try to get at least 150 minutes of exercise per week or 10,000 steps per day on a pedometer . Order or download the FREE \"Exercise & Physical Activity: Your Everyday Guide\" from [employer]. Call 7-386.365.7633 or search [employer] online. You need 2523-0868 mg of calcium and 6688-4291 IU of vitamin D per day. It is possible to meet your calcium requirement with diet alone, but a vitamin D supplement is usually necessary to meet this goal.  When exposed to the sun, use a sunscreen that protects against both UVA and UVB radiation with an SPF of 30 or greater. Reapply every 2 to 3 hours or after sweating, drying off with a towel, or swimming. Always wear a seat belt when traveling in a car. Always wear a helmet when riding a bicycle or motorcycle.

## 2022-08-30 ENCOUNTER — HOSPITAL ENCOUNTER (OUTPATIENT)
Dept: PHYSICAL THERAPY | Age: 77
Setting detail: RECURRING SERIES
Discharge: HOME | End: 2022-09-02
Payer: MEDICARE

## 2022-08-30 PROCEDURE — 97110 THERAPEUTIC EXERCISES: CPT

## 2022-08-30 PROCEDURE — 97140 MANUAL THERAPY 1/> REGIONS: CPT

## 2022-08-30 ASSESSMENT — PAIN SCALES - GENERAL: PAINLEVEL_OUTOF10: 1

## 2022-08-30 NOTE — PROGRESS NOTES
Otilia Barros  : 1945  Primary:   Secondary:  [address]  [address]  Phone: [phone]  Fax: [phone] Plan Frequency: 2 times a week for 12 weeks    Plan of Care/Certification Expiration Date: 22 (start of plan of care 2022)      PT Visit Info:   No data recorded    OUTPATIENT PHYSICAL THERAPY:OP NOTE TYPE: Treatment Note 2022       Episode  }Appt Desk              Treatment Diagnosis: Pain in left shoulder (M25.512)  Incomplete rotator cuff tear or rupture of left shoulder, not specified as traumatic (M75.112)  Bicipital tendinitis, left shoulder (M75.22)  Impingement syndrome of left shoulder (M75.42)  Medical/Referring Diagnosis:  Incomplete rotator cuff tear or rupture of left shoulder, not specified as traumatic [M75.112]  Bicipital tendinitis, left shoulder [M75.22]  Impingement syndrome of left shoulder [M75.42]  Referring Physician:  Meryle Im, MD MD Orders:  PT Eval and Treat   Physician Guidelines:  Weeks 0 - 1 (2022 - 2022): Sling with abduction pillow at all times except while showering and completing exercises; Full wrist and elbow ROM; Scapular exercises   Weeks 2 - 6 (2022 - 8/10/2022): D/C abduction pillow at 4 weeks. May begin weaning out of sling completely between 4 and 6 weeks unless otherwise specified; Forward flexion: 0-90°, external rotation: 0-30° -All ROM is passive; Progress to full by week 6; May begin isometric strengthening at week 6   Weeks 7 - 12 (2022- 2022): Full PROM in all directions; Progression of AAROM and AROM as tolerated ; May progress cuff strengthening advancing to dynamic strengthening; Scapular stabilization and proprioceptive exercises beginning week 10   Months 3+ (after 2022):  Light plyometrics may begin at month 3 or 4; Sport specific/functional exercises at month 4   Return MD Appt:  10/18/2022  Date of Onset:  Onset Date: 07/06/22 (post op)   s/p L shoulder rotator cuff repair (supraspinatus), distal clavicle resection, subacromial decompression, debridement (labrum, glenohumeral capsule, and biceps tenotomy)  Allergies:   Pravastatin  Restrictions/Precautions:  Restrictions/Precautions: Surgical protocol  No data recorded   Interventions Planned (Treatment may consist of any combination of the following):    Current Treatment Recommendations: Strengthening; ROM; Functional mobility training; Transfer training; ADL/Self-care training; Neuromuscular re-education; Manual Therapy - Soft Tissue Mobilization; Pain management; Return to work related activity; Home exercise program; Safety education & training; Modalities; Positioning; Integrated dry needling; Therapeutic activities     Subjective Comments:   Patient with minimal complaints of pain during the day. Some soreness after therapy for the rest of the day, but nothing too bad. Initial:}    1/10Post Session:       1/10  Medications Last Reviewed:  8/30/2022  Updated Objective Findings:   Fatigued by end of session    Treatment   THERAPEUTIC EXERCISE: (30 minutes):    Exercises per grid below to improve mobility, strength, coordination and dynamic movement of left shoulder to improve functional lifting, carrying, reaching and overhead activites. Required moderate visual, verbal, manual and tactile cues to promote proper body alignment, promote proper body posture and promote proper body mechanics. Progressed resistance, range, repetitions and complexity of movement as indicated.    Date:  8/22/2022 Date:  8/25/2022 Date:  8/30/2022   Activity/Exercise Parameters Parameters Parameters   Scapular retractions --- ----- ---   Amanda Quails --- ------ ---   Elbow flexion/ extension --- ------- ---   Wrist flexion/ extension --- ------- ---   Pronation/ supination --- ------- ---   Open/ close hand --- ----- ---   Pendulums  --- X 20 reps each direction ---   Wand external rotation Supine, 10 x 10 second hold Supine, GENTLE, 20 x 10 seconds ---   Seated flexion Big blue ball, x 20 Big blue ball, x 20 ---   Standing flexion Wand, 2 x 10  Wand, 2 x 10   Standing abduction Wand, 2 x 10 --- Wand, 2 x 10   Pulley Flexion, scaption, x 6 minutes total Flexion & acaption x 6 mins total ---   Isometric- flexion, abduction, internal rotation, external rotation --- Gentle x 10 reps each with a 5 second hold  ---   Wall slide Flexion and scaption, x 10 each Flexion and scaption, x 10 each Flexion and scaption, x 10 each   Band row Red, 2 x 10 Red 2x10 Red, 3 x 10   Band low row Red, 2 x 10 Red 2x10  Red, 3 x 1o0   Band internal rotation Walk outs, Yellow, 2 x 10 Walk outs, yellow band 2x10 reps  Yellow, 2 x 10   Band external rotation Walk outs, yellow, 2 x 10 Walk outs, yellow, 2x10  Yellow, 2 x 10   TRX stretching --- --- Flexion/ extension, 2 x 10 each   Bilateral external rotation --- --- Please add next session, light     Time spent with patient reviewing proper muscle recruitment and technique with exercises. MANUAL THERAPY: (23 minutes):   Joint mobilization and Soft tissue mobilization was utilized and necessary because of the patient's restricted joint motion, painful spasm, loss of articular motion and restricted motion of soft tissue. Supine PROM all directions L shoulder within physician guidelines  Gentle glenohumeral oscillations in neutral to promote relaxation and decrease pain  Soft tissue mobilization to left neck, shoulder and left UE to decrease pain and tightness   Sidelying L scapular mobilizations to improve mobility and positioning- NOT TODAY      MODALITIES: (minutes):        None today      HEP: As above; handouts given to patient for all exercises. Treatment/Session Summary:    Treatment Assessment    Patient making good progress with exercises per MD guidelines. Able to tolerace increased strengthening today with fatigue, but no pain reported.  Patient given theraband and exercises for home program. Progressing well overall. Communication/Consultation:   Reviewed HEP and progressed exercises  Equipment provided today:  Patient given updated HEP handout with red and yellow theraband  Recommendations/Intent for next treatment session: Next visit will focus on pain and edema control, manual therapy and modalities as needed, progression of ROM, postural exercises, and strengthening per MD guidelines.     Total Treatment Billable Duration:  53 minutes 6392}  Time In: 1005  Time Out: 1100    Lilli Barrow, PT       Charge Capture  }Post Session Pain  PT Visit Info  [facility] Portal  MD Guidelines  Scanned Media  Benefits  MyChart    Future Appointments   Date Time Provider Ramone Andrade   9/1/2022 10:00 AM Lilli Barrow, PT [facility]   9/6/2022 10:00 AM Lilli Barrow, PT [facility]   9/8/2022 10:00 AM Felicity Thomas, PTA [facility]   9/13/2022 10:00 AM Lilli Barrow, PT [facility]   9/15/2022 10:00 AM Lilli Barrow, PT [facility]   9/20/2022  8:00 PM Chava Aguilar, PTA [facility]   9/23/2022 10:00 AM Felicity Pancvanessa, PTA [facility]   9/27/2022 10:00 AM Felicity Pancvanessa, PTA [facility]   9/29/2022 10:00 AM Lilli Danial, PT [facility]   10/18/2022 10:00 AM Pegge Goodell, MD [facility]   8/21/2023  8:30 AM FPVIOLET MISCELLANEOUS [facility]   8/28/2023  9:30 AM Ole Coppola MD [facility]

## 2022-09-01 ENCOUNTER — HOSPITAL ENCOUNTER (OUTPATIENT)
Dept: PHYSICAL THERAPY | Age: 77
Setting detail: RECURRING SERIES
Discharge: HOME | End: 2022-09-04
Payer: MEDICARE

## 2022-09-01 PROCEDURE — 97110 THERAPEUTIC EXERCISES: CPT

## 2022-09-01 PROCEDURE — 97140 MANUAL THERAPY 1/> REGIONS: CPT

## 2022-09-01 ASSESSMENT — PAIN SCALES - GENERAL: PAINLEVEL_OUTOF10: 1

## 2022-09-01 NOTE — PROGRESS NOTES
Alethea Moss  : 1945  Primary:   Secondary:  [address] @ [address] [facility] [address]  Phone: [phone]  Fax: [phone] Plan Frequency: 2 times a week for 12 weeks    Plan of Care/Certification Expiration Date: 22 (start of plan of care 2022)      PT Visit Info:   No data recorded    OUTPATIENT PHYSICAL THERAPY:OP NOTE TYPE: Treatment Note 2022       Episode  }Appt Desk              Treatment Diagnosis: Pain in left shoulder (M25.512)  Incomplete rotator cuff tear or rupture of left shoulder, not specified as traumatic (M75.112)  Bicipital tendinitis, left shoulder (M75.22)  Impingement syndrome of left shoulder (M75.42)  Medical/Referring Diagnosis:  Incomplete rotator cuff tear or rupture of left shoulder, not specified as traumatic [M75.112]  Bicipital tendinitis, left shoulder [M75.22]  Impingement syndrome of left shoulder [M75.42]  Referring Physician:  Leonor Mosher MD MD Orders:  PT Eval and Treat   Physician Guidelines:  Weeks 0 - 1 (2022 - 2022): Sling with abduction pillow at all times except while showering and completing exercises; Full wrist and elbow ROM; Scapular exercises   Weeks 2 - 6 (2022 - 8/10/2022): D/C abduction pillow at 4 weeks. May begin weaning out of sling completely between 4 and 6 weeks unless otherwise specified; Forward flexion: 0-90°, external rotation: 0-30° -All ROM is passive; Progress to full by week 6; May begin isometric strengthening at week 6   Weeks 7 - 12 (2022- 2022): Full PROM in all directions; Progression of AAROM and AROM as tolerated ; May progress cuff strengthening advancing to dynamic strengthening; Scapular stabilization and proprioceptive exercises beginning week 10   Months 3+ (after 2022):  Light plyometrics may begin at month 3 or 4; Sport specific/functional exercises at month 4   Return MD Appt:  10/18/2022  Date of Onset:  Onset Date: 07/06/22 (post op)   s/p L shoulder rotator cuff repair (supraspinatus), distal clavicle resection, subacromial decompression, debridement (labrum, glenohumeral capsule, and biceps tenotomy)  Allergies:   Pravastatin  Restrictions/Precautions:  Restrictions/Precautions: Surgical protocol  No data recorded   Interventions Planned (Treatment may consist of any combination of the following):    Current Treatment Recommendations: Strengthening; ROM; Functional mobility training; Transfer training; ADL/Self-care training; Neuromuscular re-education; Manual Therapy - Soft Tissue Mobilization; Pain management; Return to work related activity; Home exercise program; Safety education & training; Modalities; Positioning; Integrated dry needling; Therapeutic activities     Subjective Comments:   Patient reports feeling good overall and doing the exercises at home without problems. States he had a little bit of elbow soreness last night, but nothing today. Initial:}    1/10Post Session:       1/10  Medications Last Reviewed:  9/1/2022  Updated Objective Findings:   Fatigue with band exercises    Treatment   THERAPEUTIC EXERCISE: (38 minutes):    Exercises per grid below to improve mobility, strength, coordination and dynamic movement of left shoulder to improve functional lifting, carrying, reaching and overhead activites. Required moderate visual, verbal, manual and tactile cues to promote proper body alignment, promote proper body posture and promote proper body mechanics. Progressed resistance, range, repetitions and complexity of movement as indicated.    Date:  9/1/2022 Date:  8/25/2022 Date:  8/30/2022   Activity/Exercise Parameters Parameters Parameters   Pendulums  --- X 20 reps each direction ---   Wand external rotation Supine, 10 x 10 second hold Supine, GENTLE, 20 x 10 seconds ---   Seated flexion --- Big blue ball, x 20 ---   Standing flexion Wand, 2 x 10  Wand, 2 x 10   Standing abduction Wand, 2 x 10 --- Wand, 2 x 10   Pulley --- Flexion & acaption x 6 mins total ---   Isometric- flexion, abduction, internal rotation, external rotation --- Gentle x 10 reps each with a 5 second hold  ---   Wall slide Flexion and scaption, 2 x 10 each  Flexion and scaption, x 10 each Flexion and scaption, x 10 each   Band row Green, 2 x 10 Red 2x10 Red, 3 x 10   Band low row Green, 2 x 10 Red 2x10  Red, 3 x 1o0   Band internal rotation Yellow, 3 x 10 Walk outs, yellow band 2x10 reps  Yellow, 2 x 10   Band external rotation Yellow, 3 x 10 Walk outs, yellow, 2x10  Yellow, 2 x 10   Band shoulder flexion Yellow, 2 x 10     TRX stretching Flexion/ extension, 2 x 10 each    Abduction, x 10 --- Flexion/ extension, 2 x 10 each   Bilateral external rotation Yellow, bolster on wall, 2 x 10 --- Please add next session, light   UBE Level 2, 3 minutes forward, 3 minutes backwards  Level 1, 2 minutes forward, 2 minutes backwards     Time spent with patient reviewing proper muscle recruitment and technique with exercises. MANUAL THERAPY: (15 minutes):   Joint mobilization and Soft tissue mobilization was utilized and necessary because of the patient's restricted joint motion, painful spasm, loss of articular motion and restricted motion of soft tissue. Supine PROM all directions L shoulder within physician guidelines  Gentle glenohumeral oscillations in neutral to promote relaxation and decrease pain  Soft tissue mobilization to left neck, shoulder and left UE to decrease pain and tightness   Sidelying L scapular mobilizations to improve mobility and positioning- NOT TODAY      MODALITIES: (minutes):        None today      HEP: As above; handouts given to patient for all exercises. Treatment/Session Summary:    Treatment Assessment    Patient tolerating increased repetitions and resistance well with minor fatigue, but no increased pain.   Communication/Consultation:   Reviewed HEP and progressed exercises  Equipment provided today:  None today  Recommendations/Intent for next treatment session: Next visit will focus on pain and edema control, manual therapy and modalities as needed, progression of ROM, postural exercises, and strengthening per MD guidelines.     Total Treatment Billable Duration:  53 minutes 6392}  Time In: 1007  Time Out: 1101    Arceliaderella Talala, PT       Charge Capture  }Post Session Pain  PT Visit Info  [employer] Portal  MD Guidelines  Scanned Media  Benefits  MyChart    Future Appointments   Date Time Provider Ramone Andrade   9/6/2022 10:00 AM Melissaella Talala, PT [facility]   9/8/2022 10:00 AM Aaron Espino, PTA [facility]   9/13/2022 10:00 AM Cinderella Talala, PT [facility]   9/15/2022 10:00 AM Cinderella Talala, PT [facility]   9/20/2022  8:00 PM Mar An, PTA [facility]   9/23/2022 10:00 AM Aaron Espino, PTA [facility]   9/27/2022 10:00 AM Aaron Espino, PTA [facility]   9/29/2022 10:00 AM Cinderella Talala, PT [facility]   10/18/2022 10:00 AM Lawanda Bence, MD [facility]   8/21/2023  8:30 AM VICTOR MANUEL MISCELLANEOUS [facility]   8/28/2023  9:30 AM Matthieu Bashir MD [facility]

## 2022-09-06 ENCOUNTER — HOSPITAL ENCOUNTER (OUTPATIENT)
Dept: PHYSICAL THERAPY | Age: 77
Setting detail: RECURRING SERIES
Discharge: HOME | End: 2022-09-09
Payer: MEDICARE

## 2022-09-06 PROCEDURE — 97140 MANUAL THERAPY 1/> REGIONS: CPT

## 2022-09-06 PROCEDURE — 97110 THERAPEUTIC EXERCISES: CPT

## 2022-09-06 ASSESSMENT — PAIN SCALES - GENERAL: PAINLEVEL_OUTOF10: 1

## 2022-09-06 NOTE — PROGRESS NOTES
Emiliana Sinha  : 1945  Primary:   Secondary:  [address] @ [address] [facility] [address]  Phone: [phone]  Fax: [phone] Plan Frequency: 2 times a week for 12 weeks    Plan of Care/Certification Expiration Date: 22 (start of plan of care 2022)      PT Visit Info:   No data recorded    OUTPATIENT PHYSICAL THERAPY:OP NOTE TYPE: Treatment Note 2022       Episode  }Appt Desk              Treatment Diagnosis: Pain in left shoulder (M25.512)  Incomplete rotator cuff tear or rupture of left shoulder, not specified as traumatic (M75.112)  Bicipital tendinitis, left shoulder (M75.22)  Impingement syndrome of left shoulder (M75.42)  Medical/Referring Diagnosis:  Incomplete rotator cuff tear or rupture of left shoulder, not specified as traumatic [M75.112]  Bicipital tendinitis, left shoulder [M75.22]  Impingement syndrome of left shoulder [M75.42]  Referring Physician:  Benjamin Davis MD MD Orders:  PT Eval and Treat   Physician Guidelines:  Weeks 0 - 1 (2022 - 2022): Sling with abduction pillow at all times except while showering and completing exercises; Full wrist and elbow ROM; Scapular exercises   Weeks 2 - 6 (2022 - 8/10/2022): D/C abduction pillow at 4 weeks. May begin weaning out of sling completely between 4 and 6 weeks unless otherwise specified; Forward flexion: 0-90°, external rotation: 0-30° -All ROM is passive; Progress to full by week 6; May begin isometric strengthening at week 6   Weeks 7 - 12 (2022- 2022): Full PROM in all directions; Progression of AAROM and AROM as tolerated ; May progress cuff strengthening advancing to dynamic strengthening; Scapular stabilization and proprioceptive exercises beginning week 10   Months 3+ (after 2022):  Light plyometrics may begin at month 3 or 4; Sport specific/functional exercises at month 4   Return MD Appt:  10/18/2022  Date of Onset:  Onset Date: 07/06/22 (post op)   s/p L shoulder rotator cuff repair (supraspinatus), distal clavicle resection, subacromial decompression, debridement (labrum, glenohumeral capsule, and biceps tenotomy)  Allergies:   Pravastatin  Restrictions/Precautions:  Restrictions/Precautions: Surgical protocol  No data recorded   Interventions Planned (Treatment may consist of any combination of the following):    Current Treatment Recommendations: Strengthening; ROM; Functional mobility training; Transfer training; ADL/Self-care training; Neuromuscular re-education; Manual Therapy - Soft Tissue Mobilization; Pain management; Return to work related activity; Home exercise program; Safety education & training; Modalities; Positioning; Integrated dry needling; Therapeutic activities     Subjective Comments:   Patient reports feeling good overall, but his arm being sore from getting his flu shot yesterday. Initial:}    1/10Post Session:       1/10   Medications Last Reviewed:  9/6/2022  Updated Objective Findings:   See Progress Note from today    Treatment   THERAPEUTIC EXERCISE: (38 minutes):    Exercises per grid below to improve mobility, strength, coordination and dynamic movement of left shoulder to improve functional lifting, carrying, reaching and overhead activites. Required moderate visual, verbal, manual and tactile cues to promote proper body alignment, promote proper body posture and promote proper body mechanics. Progressed resistance, range, repetitions and complexity of movement as indicated.    Date:  9/1/2022 Date:  9/6//2022 Date:  8/30/2022   Activity/Exercise Parameters Parameters Parameters   Wand external rotation Supine, 10 x 10 second hold Supine, GENTLE, 10 x 10 seconds ---   Shoulder flexion Wand, 2 x 10 Supine, wand, x 10    Supine, active, 2 x 10    Seated, active, x 10 Wand, 2 x 10   Shoulder abduction Wand, 2 x 10 Wand, 2 x 10 Wand, 2 x 10   Wall slide Flexion and scaption, 2 x 10 each  --- Flexion and scaption, x 10 each   Band row Green, 2 x 10 Green, 3 x 10 Red, 3 x 10   Band low row Green, 2 x 10 Green, 3 x 10 Red, 3 x 1o0   Band internal rotation Yellow, 3 x 10 Red, 3 x 10 Yellow, 2 x 10   Band external rotation Yellow, 3 x 10 Red, 3 x 10 Yellow, 2 x 10   Band shoulder flexion Yellow, 2 x 10 Yellow, 2 x 10    TRX stretching Flexion/ extension, 2 x 10 each    Abduction, x 10 Flexion/ extension, 2 x 10 each    Abduction, x 10 Flexion/ extension, 2 x 10 each   Bilateral external rotation Yellow, bolster on wall, 2 x 10 Yellow, 3 x 10 Please add next session, light   UBE Level 2, 3 minutes forward, 3 minutes backwards --- Level 1, 2 minutes forward, 2 minutes backwards     Time spent with patient reviewing proper muscle recruitment and technique with exercises. MANUAL THERAPY: (15 minutes):   Joint mobilization and Soft tissue mobilization was utilized and necessary because of the patient's restricted joint motion, painful spasm, loss of articular motion and restricted motion of soft tissue. Supine PROM all directions L shoulder within physician guidelines  Gentle glenohumeral oscillations in neutral to promote relaxation and decrease pain  Soft tissue mobilization to left neck, shoulder and left UE to decrease pain and tightness   Sidelying L scapular mobilizations to improve mobility and positioning- NOT TODAY      MODALITIES: (minutes):        None today      HEP: As above; handouts given to patient for all exercises. Treatment/Session Summary:    Treatment Assessment    Patient able to tolerate increased exercise today without increased pain. Good ROM today. Communication/Consultation:   Reviewed progression and safety  Equipment provided today:  None today  Recommendations/Intent for next treatment session: Next visit will focus on pain and edema control, manual therapy and modalities as needed, progression of ROM, postural exercises, and strengthening per MD guidelines.     Total Treatment Billable Duration:  53 minutes 6392}  Time In: 1005  Time Out: 1100    Everrett Toledo, PT       Charge Capture  }Post Session Pain  PT Visit Info  [employer] Portal  MD Guidelines  Scanned Media  Benefits  MyChart    Future Appointments   Date Time Provider Ramone Lupe   9/8/2022 10:00 AM Colton Mendez, PTA [facility]   9/13/2022 10:00 AM Cinthia Toledo, PT [facility]   9/15/2022 10:00 AM Cinthia Toledo, PT [facility]   9/20/2022  8:00 PM Oscar Davison, PTA [facility]   9/23/2022 10:00 AM Colton Mendez, PTA [facility]   9/27/2022 10:00 AM Colton Mendez, PTA [facility]   9/29/2022 10:00 AM Cinthia Toledo, PT [facility]   10/18/2022 10:00 AM Daniele Cheek MD [facility]   8/21/2023  8:30 AM [facility]PA MISCELLANEOUS [facility]   8/28/2023  9:30 AM Naomi Houser MD [facility]

## 2022-09-06 NOTE — PROGRESS NOTES
Eloy Duke  : 1945  Primary: [facility]  Secondary:  [address] @ [address] [facility] [address]  Phone: [phone]  Fax: [phone] Plan Frequency: 2 times a week for 12 weeks    Plan of Care/Certification Expiration Date: 22 (start of plan of care 2022)      PT Visit Info:    No data recorded    OUTPATIENT PHYSICAL THERAPY:OP NOTE TYPE: Progress Report 2022               Episode  Appt Desk         Treatment Diagnosis: Pain in left shoulder (M25.512)  Incomplete rotator cuff tear or rupture of left shoulder, not specified as traumatic (M75.112)  Bicipital tendinitis, left shoulder (M75.22)  Impingement syndrome of left shoulder (M75.42)  Medical/Referring Diagnosis:  Incomplete rotator cuff tear or rupture of left shoulder, not specified as traumatic [M75.112]  Bicipital tendinitis, left shoulder [M75.22]  Impingement syndrome of left shoulder [M75.42]  Referring Physician:  Junie Rivas MD MD Orders:  PT Eval and Treat   Physician Guidelines:  Weeks 0 - 1 (2022 - 2022): Sling with abduction pillow at all times except while showering and completing exercises; Full wrist and elbow ROM; Scapular exercises   Weeks 2 - 6 (2022 - 8/10/2022): D/C abduction pillow at 4 weeks. May begin weaning out of sling completely between 4 and 6 weeks unless otherwise specified; Forward flexion: 0-90°, external rotation: 0-30° -All ROM is passive; Progress to full by week 6; May begin isometric strengthening at week 6   Weeks 7 - 12 (2022- 2022): Full PROM in all directions; Progression of AAROM and AROM as tolerated ; May progress cuff strengthening advancing to dynamic strengthening; Scapular stabilization and proprioceptive exercises beginning week 10   Months 3+ (after 2022):  Light plyometrics may begin at month 3 or 4; Sport specific/functional exercises at month 4   Return MD Appt: Shoulder Abduction 4 (from NT) 5   Shoulder Internal Rotation  4- (from NT) 5   Shoulder External Rotation 4- (from NT) 5   Elbow Flexion 4+ (from NT) 5   Elbow Extension 4+ (from NT) 5     Passive Accessory Motion:         None performed due to acuity of surgery- will assess as needed. Neurological Screen:              Myotomes: Key muscle strength testing through R UE is [facility]. Dermatomes: Sensation to light touch for bilateral UE is intact from C4 to T2. Reflexes: not tested    Functional Mobility:         Patient requires assistance for dressing/ bathing. Unable to return to driving. Difficulty sleeping, but has returned to his bed instead of the recliner due to comfort. Unable to return to hobbies including golf and wood working. 9/6/2022: patient able to dress and bathe with no assistance. Driving OK. Reports sleeping better overall and not waking due to L shoulder. Unable to return to hobbies of golf or wood working. Balance:          Sitting and standing balance grossly intact. ASSESSMENT   Initial Assessment:  Mr. Tiffany Bundy is a 77 y.o. male presenting to physical therapy with complaints of L shoulder pain and stiffness s/p L shoulder rotator cuff repair (supraspinatus), distal clavicle resection, subacromial decompression, and extensive debridement (labrum, glenohumeral capsule, and biceps tenotomy) on 7/6/2022. Patient rates his pain about 3/10 currently describing a dull/ aching sensation gross L shoulder. He reports pain up to 6/10 with motion and was advised to try not to actively move his arm. He has been taking pain medication as needed and using ice at home. Patient reports being eager to sleep better and trying to sleep in his bed due to improved comfort compared to a recliner. Spoke with patient about positioning and propping with pillows and advised to continue wearing sling at all times except for exercise and bathing.  Patient seems motivated and eager to return to his prior level of function and independence. Patient presents with increased pain, decreased strength, decreased ROM, decreased flexibility, impaired gait, impaired posture, impaired overhead reach, impaired transfer ability, decreased activity tolerance, and overall impaired functional mobility. Patient is a good candidate for skilled physical therapy interventions to include manual therapy, therapeutic exercise, transfer training, postural re-education, body mechanics training, and pain modalities as needed . Progress Note 9/6/2022: Patient has been seen for 16 sessions of physical therapy from 7/8/2022 to 9/6/2022. He reports feeling about 70% better overall with improved ROM, gradual increase in strength, and being able to get back to some activity. He needs to continue working on full ROM, functional mobility, and strength. He is making good progress with ROM and progressing well per MD guidelines. He has met some of his goals and doing well. Patient should benefit from continued skilled therapy to address remaining goals and deficits. Problem List: (Impacting functional limitations): Body Structures, Functions, Activity Limitations Requiring Skilled Therapeutic Intervention: Decreased functional mobility ; Decreased ADL status; Decreased ROM; Decreased body mechanics; Decreased tolerance to work activity; Decreased strength; Decreased endurance; Decreased coordination; Increased pain; Decreased posture     Therapy Prognosis:   Therapy Prognosis: Good     PLAN   Effective Dates: 7/8/2022 TO Plan of Care/Certification Expiration Date: 09/30/22 (start of plan of care 7/8/2022)     Frequency/Duration: Plan Frequency: 2 times a week for 12 weeks     Interventions Planned (Treatment may consist of any combination of the following):    Current Treatment Recommendations: Strengthening; ROM;  Functional mobility training; Transfer training; ADL/Self-care training; Neuromuscular re-education; Manual Therapy - Soft Tissue Mobilization; Pain management; Return to work related activity; Home exercise program; Safety education & training; Modalities; Positioning; Integrated dry needling; Therapeutic activities     GOALS: (Goals have been discussed and agreed upon with patient.)  Short-Term Functional Goals: Time Frame: 7/82022  to 8/15/2022  Patient demonstrates independence with home exercise program without verbal cueing provided by therapist. -GOAL MET  Patient will report no more than 1/10 L shoulder pain at rest in order to demonstrate improved self pain control and tolerance. -GOAL MET  Patient will be educated in and demonstrate improved upright posture including decreased forward head and scapular protraction to improve clearance for overhead activities. -GOAL MET  Patient will be educated in use of pillows and safe sleeping positions in order to improve sleeping pattern for health and wellness. -GOAL MET  Discharge Goals: Time Frame: 7/8/2022  to 9/30/2022  Patient will improve gross L shoulder elevation ROM to at least 155 degrees in order to improve overhead reach. -ONGOING  Patient will improve L shoulder functional internal and external ROM to at least L5 and C7, respectively, in order to improve ability to dress and bathe. -ONGOING  Patient will improve gross L shoulder strength to at least 4/5 in order to demonstrate progress towards full ROM and prior hobbies. -ONGOING  Patient will be able to gradually return to golf and wood working with no complaints of pain in order to progress back to prior level of activities. -ONGOING  Patient will improve Disability of the Arm, Shoulder, and Hand score to 17/55 from 34/55. -ONGOING         Outcome Measure: Tool Used: Disabilities of the Arm, Shoulder and Hand (DASH) Questionnaire - Quick Version  Score:  Initial: 34/55  Most Recent: 26/55 (Date: 8/2/2022 )                      21/55 (Date: 9/6/2022)   Interpretation of Score:  The DASH is designed to measure the activities of daily living in person's with upper extremity dysfunction or pain. Each section is scored on a 1-5 scale, 5 representing the greatest disability. The scores of each section are added together for a total score of 55. Medical Necessity:   Patient is expected to demonstrate progress in strength, range of motion, coordination and functional technique to increase independence with dressing and bathing and improve safety during reaching, lifting, and overhead motions. Skilled intervention continues to be required due to L shoulder pain and stiffness s/p surgery. Reason For Services/Other Comments:  Patient continues to require skilled intervention due to decreased strength, ROM, and functional moblity of L shoulder hindering return to prior level of activities and independence. Total Duration:   Time In: 1005  Time Out: 1100    Regarding Thompson Victor's therapy, I certify that the treatment plan above will be carried out by a therapist or under their direction. Thank you for this referral,  Janay Orosco, PT     Referring Physician Signature: Enid Leblanc MD No Signature is Required for this note.         Post Session Pain  Charge Capture  PT Visit Info  POC Link  Treatment Note Link  MD Guidelines  [facility]

## 2022-09-08 ENCOUNTER — HOSPITAL ENCOUNTER (OUTPATIENT)
Dept: PHYSICAL THERAPY | Age: 77
Setting detail: RECURRING SERIES
Discharge: HOME | End: 2022-09-11
Payer: MEDICARE

## 2022-09-08 PROCEDURE — 97110 THERAPEUTIC EXERCISES: CPT

## 2022-09-08 PROCEDURE — 97140 MANUAL THERAPY 1/> REGIONS: CPT

## 2022-09-08 ASSESSMENT — PAIN SCALES - GENERAL: PAINLEVEL_OUTOF10: 3

## 2022-09-08 NOTE — PROGRESS NOTES
Meliza Parkinson  : 1945  Primary:   Secondary:  [address] @ [address] [facility] [place] [address]  Phone: [phone]  Fax: [phone] Plan Frequency: 2 times a week for 12 weeks    Plan of Care/Certification Expiration Date: 22 (start of plan of care 2022)      PT Visit Info:   No data recorded    OUTPATIENT PHYSICAL THERAPY:OP NOTE TYPE: Treatment Note 2022       Episode  }Appt Desk              Treatment Diagnosis: Pain in left shoulder (M25.512)  Incomplete rotator cuff tear or rupture of left shoulder, not specified as traumatic (M75.112)  Bicipital tendinitis, left shoulder (M75.22)  Impingement syndrome of left shoulder (M75.42)  Medical/Referring Diagnosis:  Incomplete rotator cuff tear or rupture of left shoulder, not specified as traumatic [M75.112]  Bicipital tendinitis, left shoulder [M75.22]  Impingement syndrome of left shoulder [M75.42]  Referring Physician:  Maicol Lainez MD MD Orders:  PT Eval and Treat   Physician Guidelines:  Weeks 0 - 1 (2022 - 2022): Sling with abduction pillow at all times except while showering and completing exercises; Full wrist and elbow ROM; Scapular exercises   Weeks 2 - 6 (2022 - 8/10/2022): D/C abduction pillow at 4 weeks. May begin weaning out of sling completely between 4 and 6 weeks unless otherwise specified; Forward flexion: 0-90°, external rotation: 0-30° -All ROM is passive; Progress to full by week 6; May begin isometric strengthening at week 6   Weeks 7 - 12 (2022- 2022): Full PROM in all directions; Progression of AAROM and AROM as tolerated ; May progress cuff strengthening advancing to dynamic strengthening; Scapular stabilization and proprioceptive exercises beginning week 10   Months 3+ (after 2022):  Light plyometrics may begin at month 3 or 4; Sport specific/functional exercises at month 4   Return MD Appt:  10/18/2022  Date of Onset:  Onset Date: 07/06/22 (post op)   s/p L shoulder rotator cuff repair (supraspinatus), distal clavicle resection, subacromial decompression, debridement (labrum, glenohumeral capsule, and biceps tenotomy)  Allergies:   Pravastatin  Restrictions/Precautions:  Restrictions/Precautions: Surgical protocol  No data recorded   Interventions Planned (Treatment may consist of any combination of the following):    Current Treatment Recommendations: Strengthening; ROM; Functional mobility training; Transfer training; ADL/Self-care training; Neuromuscular re-education; Manual Therapy - Soft Tissue Mobilization; Pain management; Return to work related activity; Home exercise program; Safety education & training; Modalities; Positioning; Integrated dry needling; Therapeutic activities     Subjective Comments:   Pt. reported slow steady progress with increased functional moblity and decreased pain  Initial:}    3/10Post Session:       1/10   Medications Last Reviewed:  9/8/2022  Updated Objective Findings:   Improved shoulder range and compliant with all exercises    Treatment   THERAPEUTIC EXERCISE: (40 minutes):    Exercises per grid below to improve mobility, strength, coordination and dynamic movement of left shoulder to improve functional lifting, carrying, reaching and overhead activites. Required moderate visual, verbal, manual and tactile cues to promote proper body alignment, promote proper body posture and promote proper body mechanics. Progressed resistance, range, repetitions and complexity of movement as indicated.    Date:  9/1/2022 Date:  9/6//2022 Date:  9/8/22   Activity/Exercise Parameters Parameters Parameters   Wand external rotation Supine, 10 x 10 second hold Supine, GENTLE, 10 x 10 seconds ---   Shoulder flexion Wand, 2 x 10 Supine, wand, x 10    Supine, active, 2 x 10    Seated, active, x 10 Standing scaption and flexion to 90 degrees    Shoulder abduction Wand, 2 x 10 Wand, 2 x 10 Wand, 2 x 10   Wall slide Flexion and scaption, 2 x 10 each  Flexion & scaption x 10 reps each  Flexion and scaption, x 10 each   Band row Green, 2 x 10 Green, 3 x 10 Green  3 x 10   Band low row Green, 2 x 10 Green, 3 x 10 Green 3 x 10   Band internal rotation Yellow, 3 x 10 Red, 3 x 10 Red ,3  x 10   Band external rotation Yellow, 3 x 10 Red, 3 x 10 red 3 x 10   Band shoulder flexion Yellow, 2 x 10 Yellow, 2 x 10 Yellow 3x10    TRX stretching Flexion/ extension, 2 x 10 each    Abduction, x 10 Flexion/ extension, 2 x 10 each    Abduction, x 10 Flexion/ extension, 3 x 10 each    Abduction x 10 reps    Bilateral external rotation Yellow, bolster on wall, 2 x 10 Yellow, 3 x 10 Yellow band 3x10 reps    UBE Level 2, 3 minutes forward, 3 minutes backwards  Level 3, 3 minutes forward, 3 minutes backwards     Time spent with patient reviewing proper muscle recruitment and technique with exercises. MANUAL THERAPY: (15 minutes):   Joint mobilization and Soft tissue mobilization was utilized and necessary because of the patient's restricted joint motion, painful spasm, loss of articular motion and restricted motion of soft tissue. Supine PROM all directions L shoulder within physician guidelines  Gentle glenohumeral oscillations in neutral to promote relaxation and decrease pain  Soft tissue mobilization left shoulder and UE to decrease pain and tightness        MODALITIES: (8 minutes):        Pt. Received ice pack to left shoulder in supine to decrease pain and tightness at the end of session      HEP: As above; handouts given to patient for all exercises.     Treatment/Session Summary:    Treatment Assessment    Pt. continues to be compliant with all exercises and imprved functional mobilty within MD guidelines  Communication/Consultation:   Reviewed progression and safety  Equipment provided today:  None today  Recommendations/Intent for next treatment session: Next visit will focus on pain and edema control, manual therapy and modalities as needed,

## 2022-09-13 ENCOUNTER — HOSPITAL ENCOUNTER (OUTPATIENT)
Dept: PHYSICAL THERAPY | Age: 77
Setting detail: RECURRING SERIES
Discharge: HOME | End: 2022-09-16
Payer: MEDICARE

## 2022-09-13 PROCEDURE — 97140 MANUAL THERAPY 1/> REGIONS: CPT

## 2022-09-13 PROCEDURE — 97110 THERAPEUTIC EXERCISES: CPT

## 2022-09-13 ASSESSMENT — PAIN SCALES - GENERAL: PAINLEVEL_OUTOF10: 1

## 2022-09-13 NOTE — PROGRESS NOTES
Michael Ann  : 1945  Primary:   Secondary:  Atul De La Paz @ [address] [facility] [address]  Phone: [phone]  Fax: [phone] Plan Frequency: 2 times a week for 12 weeks    Plan of Care/Certification Expiration Date: 22 (start of plan of care 2022)      PT Visit Info:   No data recorded    OUTPATIENT PHYSICAL THERAPY:OP NOTE TYPE: Treatment Note 2022       Episode  }Appt Desk              Treatment Diagnosis: Pain in left shoulder (M25.512)  Incomplete rotator cuff tear or rupture of left shoulder, not specified as traumatic (M75.112)  Bicipital tendinitis, left shoulder (M75.22)  Impingement syndrome of left shoulder (M75.42)  Medical/Referring Diagnosis:  Incomplete rotator cuff tear or rupture of left shoulder, not specified as traumatic [M75.112]  Bicipital tendinitis, left shoulder [M75.22]  Impingement syndrome of left shoulder [M75.42]  Referring Physician:  Tiffani Steward MD MD Orders:  PT Eval and Treat   Physician Guidelines:  Weeks 0 - 1 (2022 - 2022): Sling with abduction pillow at all times except while showering and completing exercises; Full wrist and elbow ROM; Scapular exercises   Weeks 2 - 6 (2022 - 8/10/2022): D/C abduction pillow at 4 weeks. May begin weaning out of sling completely between 4 and 6 weeks unless otherwise specified; Forward flexion: 0-90°, external rotation: 0-30° -All ROM is passive; Progress to full by week 6; May begin isometric strengthening at week 6   Weeks 7 - 12 (2022- 2022): Full PROM in all directions; Progression of AAROM and AROM as tolerated ; May progress cuff strengthening advancing to dynamic strengthening; Scapular stabilization and proprioceptive exercises beginning week 10   Months 3+ (after 2022):  Light plyometrics may begin at month 3 or 4; Sport specific/functional exercises at month 4   Return MD Appt:  10/18/2022  Date of Onset:  Onset Date: 07/06/22 (post op)   s/p L shoulder rotator cuff repair (supraspinatus), distal clavicle resection, subacromial decompression, debridement (labrum, glenohumeral capsule, and biceps tenotomy)  Allergies:   Pravastatin  Restrictions/Precautions:  Restrictions/Precautions: Surgical protocol  No data recorded   Interventions Planned (Treatment may consist of any combination of the following):    Current Treatment Recommendations: Strengthening; ROM; Functional mobility training; Transfer training; ADL/Self-care training; Neuromuscular re-education; Manual Therapy - Soft Tissue Mobilization; Pain management; Return to work related activity; Home exercise program; Safety education & training; Modalities; Positioning; Integrated dry needling; Therapeutic activities     Subjective Comments:   Patient with no complaints and feels like he is doing well overall. Initial:}    1/10Post Session:       1/10   Medications Last Reviewed:  9/13/2022  Updated Objective Findings:   Fatigued with rhythmic stabilization exercises    Treatment   THERAPEUTIC EXERCISE: (40 minutes):    Exercises per grid below to improve mobility, strength, coordination and dynamic movement of left shoulder to improve functional lifting, carrying, reaching and overhead activites. Required moderate visual, verbal, manual and tactile cues to promote proper body alignment, promote proper body posture and promote proper body mechanics. Progressed resistance, range, repetitions and complexity of movement as indicated.    Date:  9/13/2022 Date:  9/6//2022 Date:  9/8/22   Activity/Exercise Parameters Parameters Parameters   Wand external rotation --- Supine, GENTLE, 10 x 10 seconds ---   Shoulder flexion Wand, 2 x 10 Supine, wand, x 10    Supine, active, 2 x 10    Seated, active, x 10 Standing scaption and flexion to 90 degrees    Shoulder abduction Wand, 2 x 10 Wand, 2 x 10 Wand, 2 x 10   Wall slide Flexion and scaption, 2 x 10 each  Flexion & scaption x 10 reps each  Flexion and scaption, x 10 each   Band row Blue, 3 x 10 Green, 3 x 10 Green  3 x 10   Band low row Blue, 3 x 10 Green, 3 x 10 Green 3 x 10   Band internal rotation Green, 3 x 10 Red, 3 x 10 Red ,3  x 10   Band external rotation Green, 3 x 10 Red, 3 x 10 red 3 x 10   Band shoulder flexion Red, 2 x 10 Yellow, 2 x 10 Yellow 3x10    TRX stretching Flexion/ extension, 2 x 10 each    Abduction, x 10 Flexion/ extension, 2 x 10 each    Abduction, x 10 Flexion/ extension, 3 x 10 each    Abduction x 10 reps    Bilateral external rotation Bolster on wall, red, 3 x 10 Yellow, 3 x 10 Yellow band 3x10 reps    Shoulder flexion, scaption, abduction Bolster on wall, 2 x 10 each     PNF- D2 flexion/ extension Supine, AAROM, 2 x 10     UBE Level 2, 3 minutes forward, 3 minutes backwards  Level 3, 3 minutes forward, 3 minutes backwards     Time spent with patient reviewing proper muscle recruitment and technique with exercises. MANUAL THERAPY: (15 minutes):   Joint mobilization and Soft tissue mobilization was utilized and necessary because of the patient's restricted joint motion, painful spasm, loss of articular motion and restricted motion of soft tissue. Gentle glenohumeral oscillations in neutral to promote relaxation and decrease pain  Soft tissue mobilization left shoulder and UE to decrease pain and tightness  Supine rhythmic stabilizations with L shoulder at 90 degrees flexion to improve stability  Sidelying scapular mobilizations to improve mobility and positioning        MODALITIES: ( minutes):        None today      HEP: As above; handouts given to patient for all exercises. Treatment/Session Summary:    Treatment Assessment    Patient with increased fatigue at end of session, but no increased pain. improving with overall activity tolerance and scapular positioning with exercises. Needs to continue with progression of strengthening as able.   Communication/Consultation:   Reviewed progression and safety  Equipment provided today:  None today  Recommendations/Intent for next treatment session: Next visit will focus on pain and edema control, manual therapy and modalities as needed, progression of ROM, postural exercises, and strengthening per MD guidelines.     Total Treatment Billable Duration:  55 minutes 6392}  Time In: 1004  Time Out: 1100    Tiffanie Dudley, PT       Charge Capture  }Post Session Pain  PT Visit Info  [employer] Portal  MD Guidelines  Scanned Media  Benefits  MyChart    Future Appointments   Date Time Provider Ramone Andrade   9/15/2022 10:00 AM Tiffanie Dudley PT [facility]   9/20/2022  8:00 PM Mary Lou Benavidez [facility]   9/23/2022 10:00 AM Rad Marin [facility]   9/27/2022 10:00 AM Rad Marin PTA [facility]   9/29/2022 10:00 AM Tiffanie Dudley PT [facility]   10/18/2022 10:00 AM Guevara Jenkins MD [facility]   8/21/2023  8:30 AM FPA MISCELLANEOUS [facility]   8/28/2023  9:30 AM Stephanie Mckeon MD [facility]

## 2022-09-15 ENCOUNTER — HOSPITAL ENCOUNTER (OUTPATIENT)
Dept: PHYSICAL THERAPY | Age: 77
Setting detail: RECURRING SERIES
Discharge: HOME | End: 2022-09-18
Payer: MEDICARE

## 2022-09-15 PROCEDURE — 97140 MANUAL THERAPY 1/> REGIONS: CPT

## 2022-09-15 PROCEDURE — 97110 THERAPEUTIC EXERCISES: CPT

## 2022-09-15 ASSESSMENT — PAIN SCALES - GENERAL: PAINLEVEL_OUTOF10: 1

## 2022-09-15 NOTE — PROGRESS NOTES
Tawanna Bill  : 1945  Primary:   Secondary:  [address] @ [address] [facility] [address]  Phone: [phone]  Fax: [phone] Plan Frequency: 2 times a week for 12 weeks    Plan of Care/Certification Expiration Date: 22 (start of plan of care 2022)      PT Visit Info:   No data recorded    OUTPATIENT PHYSICAL THERAPY:OP NOTE TYPE: Treatment Note 9/15/2022       Episode  }Appt Desk              Treatment Diagnosis: Pain in left shoulder (M25.512)  Incomplete rotator cuff tear or rupture of left shoulder, not specified as traumatic (M75.112)  Bicipital tendinitis, left shoulder (M75.22)  Impingement syndrome of left shoulder (M75.42)  Medical/Referring Diagnosis:  Incomplete rotator cuff tear or rupture of left shoulder, not specified as traumatic [M75.112]  Bicipital tendinitis, left shoulder [M75.22]  Impingement syndrome of left shoulder [M75.42]  Referring Physician:  Maicol Lainez MD MD Orders:  PT Eval and Treat   Physician Guidelines:  Weeks 0 - 1 (2022 - 2022): Sling with abduction pillow at all times except while showering and completing exercises; Full wrist and elbow ROM; Scapular exercises   Weeks 2 - 6 (2022 - 8/10/2022): D/C abduction pillow at 4 weeks. May begin weaning out of sling completely between 4 and 6 weeks unless otherwise specified; Forward flexion: 0-90°, external rotation: 0-30° -All ROM is passive; Progress to full by week 6; May begin isometric strengthening at week 6   Weeks 7 - 12 (2022- 2022): Full PROM in all directions; Progression of AAROM and AROM as tolerated ; May progress cuff strengthening advancing to dynamic strengthening; Scapular stabilization and proprioceptive exercises beginning week 10   Months 3+ (after 2022):  Light plyometrics may begin at month 3 or 4; Sport specific/functional exercises at month 4   Return MD Appt:  10/18/2022  Date of Onset:  Onset Date: 07/06/22 (post op)   s/p L shoulder rotator cuff repair (supraspinatus), distal clavicle resection, subacromial decompression, debridement (labrum, glenohumeral capsule, and biceps tenotomy)  Allergies:   Pravastatin  Restrictions/Precautions:  Restrictions/Precautions: Surgical protocol  No data recorded   Interventions Planned (Treatment may consist of any combination of the following):    Current Treatment Recommendations: Strengthening; ROM; Functional mobility training; Transfer training; ADL/Self-care training; Neuromuscular re-education; Manual Therapy - Soft Tissue Mobilization; Pain management; Return to work related activity; Home exercise program; Safety education & training; Modalities; Positioning; Integrated dry needling; Therapeutic activities     Subjective Comments:   Patient with a little soreness today, but no complaints. Feels like he is doing well overall. Initial:}    1/10Post Session:       1/10   Medications Last Reviewed:  9/15/2022  Updated Objective Findings:   Fatigue/ soreness with exercises    Treatment   THERAPEUTIC EXERCISE: (45 minutes):    Exercises per grid below to improve mobility, strength, coordination and dynamic movement of left shoulder to improve functional lifting, carrying, reaching and overhead activites. Required moderate visual, verbal, manual and tactile cues to promote proper body alignment, promote proper body posture and promote proper body mechanics. Progressed resistance, range, repetitions and complexity of movement as indicated.    Date:  9/13/2022 Date:  9/15/2022 Date:  9/8/22   Activity/Exercise Parameters Parameters Parameters   Wand external rotation --- --- ---   Shoulder flexion Wand, 2 x 10 Seated, active, x 10 Standing scaption and flexion to 90 degrees    Shoulder abduction Wand, 2 x 10 --- Wand, 2 x 10   Wall slide Flexion and scaption, 2 x 10 each  With lift, x 10 Flexion and scaption, x 10 each   Band row Blue, 3 x 10 Blue, 3 x 10 Green  3 x 10   Band low row Blue, 3 x 10 Blue, 3 x 10 Green 3 x 10   Band internal rotation Green, 3 x 10 Green, 3 x 10 Red ,3  x 10   Band external rotation Green, 3 x 10 Green, 3 x 10 red 3 x 10   Band shoulder flexion Red, 2 x 10 Red, 2 x 10 Yellow 3x10    TRX stretching Flexion/ extension, 2 x 10 each    Abduction, x 10 Flexion/ extension, 2 x 10 each    Abduction, x 10 Flexion/ extension, 3 x 10 each    Abduction x 10 reps    Bilateral external rotation Bolster on wall, red, 3 x 10 Bolster on wall, red, 2 x 10 Yellow band 3x10 reps    Shoulder flexion, scaption, abduction Bolster on wall, 2 x 10 each Bolster on wall, 2 x 10 each    PNF- D2 flexion/ extension Supine, AAROM, 2 x 10 Supine, AAROM, 2 x 10    Functional internal rotation stretch --- Strap, 10 x 10 seconds    UBE Level 2, 3 minutes forward, 3 minutes backwards Level 3, 4 minutes forward, 4 minutes backwards Level 3, 3 minutes forward, 3 minutes backwards     Time spent with patient reviewing proper muscle recruitment and technique with exercises. MANUAL THERAPY: (10 minutes):   Joint mobilization and Soft tissue mobilization was utilized and necessary because of the patient's restricted joint motion, painful spasm, loss of articular motion and restricted motion of soft tissue. Gentle glenohumeral oscillations in neutral to promote relaxation and decrease pain  Soft tissue mobilization left shoulder and UE to decrease pain and tightness  Supine rhythmic stabilizations with L shoulder at 90 degrees flexion to improve stability  Sidelying scapular mobilizations to improve mobility and positioning- NOT TODAY        MODALITIES: ( minutes):        None today      HEP: As above; handouts given to patient for all exercises. Treatment/Session Summary:    Treatment Assessment    Patient making good progress per guidelines. Plan to continue with strength progression as tolerated. Some tightness end range elevation.  Soreness with functional internal rotation stretching- advised to perform very gently at home  Communication/Consultation:   Reviewed progression and safety  Equipment provided today:  None today  Recommendations/Intent for next treatment session: Next visit will focus on pain and edema control, manual therapy and modalities as needed, progression of ROM, postural exercises, and strengthening per MD guidelines.     Total Treatment Billable Duration:  55 minutes 6392}  Time In: 1004  Time Out: 1100    Alivia Helton, PT       Charge Capture  }Post Session Pain  PT Visit Info  [employer] Portal  MD Guidelines  Scanned Media  Benefits  MyChart    Future Appointments   Date Time Provider Ramone Andrade   9/20/2022  8:00 PM Allison Dong [facility]   9/23/2022 10:00 AM Ambreen Kim [facility]   9/27/2022 10:00 AM Ambreen Kim [facility]   9/29/2022 10:00 AM Alivia Helton, PT [facility]   10/18/2022 10:00 AM Natalee Mcgill MD [facility]   8/21/2023  8:30 AM FPA MISCELLANEOUS [facility]   8/28/2023  9:30 AM Chari Crawley MD [facility]

## 2022-09-20 ENCOUNTER — HOSPITAL ENCOUNTER (OUTPATIENT)
Dept: PHYSICAL THERAPY | Age: 77
Setting detail: RECURRING SERIES
End: 2022-09-20
Payer: MEDICARE

## 2022-09-23 ENCOUNTER — HOSPITAL ENCOUNTER (OUTPATIENT)
Dept: PHYSICAL THERAPY | Age: 77
Setting detail: RECURRING SERIES
End: 2022-09-23
Payer: MEDICARE

## 2022-09-27 ENCOUNTER — HOSPITAL ENCOUNTER (OUTPATIENT)
Dept: PHYSICAL THERAPY | Age: 77
Setting detail: RECURRING SERIES
Discharge: HOME | End: 2022-09-30
Payer: MEDICARE

## 2022-09-27 PROCEDURE — 97110 THERAPEUTIC EXERCISES: CPT

## 2022-09-27 PROCEDURE — 97140 MANUAL THERAPY 1/> REGIONS: CPT

## 2022-09-27 ASSESSMENT — PAIN SCALES - GENERAL: PAINLEVEL_OUTOF10: 1

## 2022-09-27 NOTE — PROGRESS NOTES
Lilian Dolan  : 1945  Primary:   Secondary:  [address] @ [address]  [address]  Phone: [phone]  Fax: [phone] Plan Frequency: 2 times a week for 12 weeks    Plan of Care/Certification Expiration Date: 22 (start of plan of care 2022)      PT Visit Info:   No data recorded    OUTPATIENT PHYSICAL THERAPY:OP NOTE TYPE: Treatment Note 2022       Episode  }Appt Desk              Treatment Diagnosis: Pain in left shoulder (M25.512)  Incomplete rotator cuff tear or rupture of left shoulder, not specified as traumatic (M75.112)  Bicipital tendinitis, left shoulder (M75.22)  Impingement syndrome of left shoulder (M75.42)  Medical/Referring Diagnosis:  Incomplete rotator cuff tear or rupture of left shoulder, not specified as traumatic [M75.112]  Bicipital tendinitis, left shoulder [M75.22]  Impingement syndrome of left shoulder [M75.42]  Referring Physician:  Dustin Martinez MD MD Orders:  PT Eval and Treat   Physician Guidelines:  Weeks 0 - 1 (2022 - 2022): Sling with abduction pillow at all times except while showering and completing exercises; Full wrist and elbow ROM; Scapular exercises   Weeks 2 - 6 (2022 - 8/10/2022): D/C abduction pillow at 4 weeks. May begin weaning out of sling completely between 4 and 6 weeks unless otherwise specified; Forward flexion: 0-90°, external rotation: 0-30° -All ROM is passive; Progress to full by week 6; May begin isometric strengthening at week 6   Weeks 7 - 12 (2022- 2022): Full PROM in all directions; Progression of AAROM and AROM as tolerated ; May progress cuff strengthening advancing to dynamic strengthening; Scapular stabilization and proprioceptive exercises beginning week 10   Months 3+ (after 2022):  Light plyometrics may begin at month 3 or 4; Sport specific/functional exercises at month 4   Return MD Appt:  10/18/2022  Date of Onset:  Onset Date: 07/06/22 (post op)   s/p L shoulder rotator cuff repair (supraspinatus), distal clavicle resection, subacromial decompression, debridement (labrum, glenohumeral capsule, and biceps tenotomy)  Allergies:   Pravastatin  Restrictions/Precautions:  Restrictions/Precautions: Surgical protocol  No data recorded   Interventions Planned (Treatment may consist of any combination of the following):    Current Treatment Recommendations: Strengthening; ROM; Functional mobility training; Transfer training; ADL/Self-care training; Neuromuscular re-education; Manual Therapy - Soft Tissue Mobilization; Pain management; Return to work related activity; Home exercise program; Safety education & training; Modalities; Positioning; Integrated dry needling; Therapeutic activities     Subjective Comments:      Initial:}    1/10Post Session:       1/10   Medications Last Reviewed:  9/27/2022  Updated Objective Findings:   Pt. Was compliant with all exercises with no complaints of increased pain    Treatment   THERAPEUTIC EXERCISE: (45 minutes):    Exercises per grid below to improve mobility, strength, coordination and dynamic movement of left shoulder to improve functional lifting, carrying, reaching and overhead activites. Required moderate visual, verbal, manual and tactile cues to promote proper body alignment, promote proper body posture and promote proper body mechanics. Progressed resistance, range, repetitions and complexity of movement as indicated.    Date:  9/13/2022 Date:  9/15/2022 Date:  9/27/22   Activity/Exercise Parameters Parameters Parameters   Wand external rotation --- --- ---   Shoulder flexion Wand, 2 x 10 Seated, active, x 10 Standing scaption and flexion to 90 degrees    Shoulder abduction Wand, 2 x 10 --- Wand, 2 x 10   Wall slide Flexion and scaption, 2 x 10 each  With lift, x 10 Flexion and scaption, x 10 each   Band row Blue, 3 x 10 Blue, 3 x 10 Blue, 3 x 10   Band low row Blue, 3 x 10 Blue, 3 x 10 Blue,  3 x 10   Band internal rotation Green, 3 x 10 Green, 3 x 10 green 3  x 10   Band external rotation Green, 3 x 10 Green, 3 x 10 green 3 x 10   Band shoulder flexion Red, 2 x 10 Red, 2 x 10 Green 3x10    TRX stretching Flexion/ extension, 2 x 10 each    Abduction, x 10 Flexion/ extension, 2 x 10 each    Abduction, x 10 Flexion/ extension, 3 x 10 each    Abduction x 10 reps    Bilateral external rotation Bolster on wall, red, 3 x 10 Bolster on wall, red, 2 x 10 Bolster on wall with red band 3x10 reps    Shoulder flexion, scaption, abduction Bolster on wall, 2 x 10 each Bolster on wall, 2 x 10 each Bolster on wall 2x10 each    PNF- D2 flexion/ extension Supine, AAROM, 2 x 10 Supine, AAROM, 2 x 10 Supine AAROM, 2x10    Functional internal rotation stretch --- Strap, 10 x 10 seconds 4x30 sec hold strap    UBE Level 2, 3 minutes forward, 3 minutes backwards Level 3, 4 minutes forward, 4 minutes backwards Level 3, 4 minutes forward, 4 minutes backwards     Time spent with patient reviewing proper muscle recruitment and technique with exercises. MANUAL THERAPY: (10 minutes):   Joint mobilization and Soft tissue mobilization was utilized and necessary because of the patient's restricted joint motion, painful spasm, loss of articular motion and restricted motion of soft tissue. Gentle glenohumeral oscillations in neutral to promote relaxation and decrease pain  Soft tissue mobilization left shoulder and UE to decrease pain and tightness          MODALITIES: ( 8 minutes):        Pt. Received ice pack to shoulder in supine at the end of session today to decrease pain and tightness in shoulder after exercises. HEP: As above; handouts given to patient for all exercises. Treatment/Session Summary:    Treatment Assessment    Pt. was compliant with all exercises and continues to show improvements with range of motion and strength.   Communication/Consultation:   Reviewed progression and safety  Equipment provided today:  None today  Recommendations/Intent for next treatment session: Next visit will focus on pain and edema control, manual therapy and modalities as needed, progression of ROM, postural exercises, and strengthening per MD guidelines.     Total Treatment Billable Duration:  55 minutes 6392}  Time In: 1000  Time Out: 1105    JAMEY DELEON PTA       Charge Capture  }Post Session Pain  PT Visit Info  [employer] Portal  MD Guidelines  Scanned Media  Benefits  MyChart    Future Appointments   Date Time Provider Ramone Andrade   9/29/2022 10:00 AM Abel Farrell PT [facility]   10/18/2022 10:00 AM Ilda Slaughter MD [facility]   8/21/2023  8:30 AM [facility]PA M[facility]CELL[facility]OUS [facility]   8/28/2023  9:30 AM Lui Veliz MD [facility]

## 2022-09-29 ENCOUNTER — HOSPITAL ENCOUNTER (OUTPATIENT)
Dept: PHYSICAL THERAPY | Age: 77
Setting detail: RECURRING SERIES
Discharge: HOME | End: 2022-10-02
Payer: MEDICARE

## 2022-09-29 PROCEDURE — 97140 MANUAL THERAPY 1/> REGIONS: CPT

## 2022-09-29 PROCEDURE — 97110 THERAPEUTIC EXERCISES: CPT

## 2022-09-29 ASSESSMENT — PAIN SCALES - GENERAL: PAINLEVEL_OUTOF10: 1

## 2022-09-29 NOTE — PROGRESS NOTES
Zakia Hernandez  : 1945  Primary:   Secondary:  [address] @ [address] [facility] [address]  Phone: [phone]  Fax: [phone] Plan Frequency: 2 times a week for 12 weeks    Plan of Care/Certification Expiration Date: 22 (start of plan of care 2022)      PT Visit Info:   No data recorded    OUTPATIENT PHYSICAL THERAPY:OP NOTE TYPE: Treatment Note 2022       Episode  }Appt Desk              Treatment Diagnosis: Pain in left shoulder (M25.512)  Incomplete rotator cuff tear or rupture of left shoulder, not specified as traumatic (M75.112)  Bicipital tendinitis, left shoulder (M75.22)  Impingement syndrome of left shoulder (M75.42)  Medical/Referring Diagnosis:  Incomplete rotator cuff tear or rupture of left shoulder, not specified as traumatic [M75.112]  Bicipital tendinitis, left shoulder [M75.22]  Impingement syndrome of left shoulder [M75.42]  Referring Physician:  Isamar Morataya MD MD Orders:  PT Eval and Treat   Physician Guidelines:  Weeks 0 - 1 (2022 - 2022): Sling with abduction pillow at all times except while showering and completing exercises; Full wrist and elbow ROM; Scapular exercises   Weeks 2 - 6 (2022 - 8/10/2022): D/C abduction pillow at 4 weeks. May begin weaning out of sling completely between 4 and 6 weeks unless otherwise specified; Forward flexion: 0-90°, external rotation: 0-30° -All ROM is passive; Progress to full by week 6; May begin isometric strengthening at week 6   Weeks 7 - 12 (2022- 2022): Full PROM in all directions; Progression of AAROM and AROM as tolerated ; May progress cuff strengthening advancing to dynamic strengthening; Scapular stabilization and proprioceptive exercises beginning week 10   Months 3+ (after 2022):  Light plyometrics may begin at month 3 or 4; Sport specific/functional exercises at month 4   Return MD Appt:  10/18/2022  Date of Onset:  Onset Date: 07/06/22 (post op)   s/p L shoulder rotator cuff repair (supraspinatus), distal clavicle resection, subacromial decompression, debridement (labrum, glenohumeral capsule, and biceps tenotomy)  Allergies:   Pravastatin  Restrictions/Precautions:  Restrictions/Precautions: Surgical protocol  No data recorded   Interventions Planned (Treatment may consist of any combination of the following):    Current Treatment Recommendations: Strengthening; ROM; Functional mobility training; Transfer training; ADL/Self-care training; Neuromuscular re-education; Manual Therapy - Soft Tissue Mobilization; Pain management; Return to work related activity; Home exercise program; Safety education & training; Modalities; Positioning; Integrated dry needling; Therapeutic activities     Subjective Comments:   Patient reports feeling good overall and being ready to continue on his own at home. States he still feels a little tight with overhead motions, but feeling good overall and back to his regular activities without problems. Initial:}    1/10Post Session:       1/10   Medications Last Reviewed:  9/29/2022  Updated Objective Findings:   See Discharge Note from today    Treatment   THERAPEUTIC EXERCISE: (45 minutes):    Exercises per grid below to improve mobility, strength, coordination and dynamic movement of left shoulder to improve functional lifting, carrying, reaching and overhead activites. Required moderate visual, verbal, manual and tactile cues to promote proper body alignment, promote proper body posture and promote proper body mechanics. Progressed resistance, range, repetitions and complexity of movement as indicated.    Date:  9/29/2022 Date:  9/15/2022 Date:  9/27/22   Activity/Exercise Parameters Parameters Parameters   Wand external rotation --- --- ---   Shoulder flexion --- Seated, active, x 10 Standing scaption and flexion to 90 degrees    Shoulder abduction --- --- Wand, 2 x 10   Wall slide Flexion and scaption, 2 x 10 each  With lift, x 10 Flexion and scaption, x 10 each   Band row Blue, 3 x 10 Blue, 3 x 10 Blue, 3 x 10   Band low row Blue, 3 x 10 Blue, 3 x 10 Blue,  3 x 10   Band internal rotation Blue, 3 x 10 Green, 3 x 10 green 3  x 10   Band external rotation Blue, 3 x 10 Green, 3 x 10 green 3 x 10   Band shoulder flexion Green, 2 x 10 Red, 2 x 10 Green 3x10    TRX stretching Flexion/ extension, 2 x 10 each    Abduction, x 10 Flexion/ extension, 2 x 10 each    Abduction, x 10 Flexion/ extension, 3 x 10 each    Abduction x 10 reps    Bilateral external rotation Bolster on wall, red, 3 x 10 Bolster on wall, red, 2 x 10 Bolster on wall with red band 3x10 reps    Shoulder flexion, scaption, abduction --- Bolster on wall, 2 x 10 each Bolster on wall 2x10 each    PNF- D2 flexion/ extension Supine, AAROM, 2 x 10 Supine, AAROM, 2 x 10 Supine AAROM, 2x10    Functional internal rotation stretch reviewed Strap, 10 x 10 seconds 4x30 sec hold strap    UBE Level 5, 3 minutes forward, 3 minutes backwards Level 3, 4 minutes forward, 4 minutes backwards Level 3, 4 minutes forward, 4 minutes backwards   PROM L shoulder All directions, 8 minutes       Time spent with patient reviewing proper muscle recruitment and technique with exercises. MANUAL THERAPY: (10 minutes):   Joint mobilization and Soft tissue mobilization was utilized and necessary because of the patient's restricted joint motion, painful spasm, loss of articular motion and restricted motion of soft tissue. Gentle glenohumeral oscillations in neutral to promote relaxation and decrease pain  Soft tissue mobilization left shoulder and UE to decrease pain and tightness      MODALITIES: ( minutes):        None today      HEP: As above; handouts given to patient for all exercises. Treatment/Session Summary:    Treatment Assessment    Patient with good understanding of HEP and progression at home.  Advised him to continue working on strengthening with gradual

## 2022-09-29 NOTE — THERAPY DISCHARGE
Gurinder Milan  : 1945  Primary: [facility]  Secondary:  [address] @ [address] [place] [address]  Phone: [phone]  Fax: [phone] Plan Frequency: Patient discharged to [facility]    Plan of Care/Certification Expiration Date: 22 (start of plan of care 2022)      PT Visit Info:    No data recorded    OUTPATIENT PHYSICAL THERAPY:OP NOTE TYPE: Progress Report and Discharge  2022               Episode  Appt Desk         Treatment Diagnosis: Pain in left shoulder (M25.512)  Incomplete rotator cuff tear or rupture of left shoulder, not specified as traumatic (M75.112)  Bicipital tendinitis, left shoulder (M75.22)  Impingement syndrome of left shoulder (M75.42)  Medical/Referring Diagnosis:  Incomplete rotator cuff tear or rupture of left shoulder, not specified as traumatic [M75.112]  Bicipital tendinitis, left shoulder [M75.22]  Impingement syndrome of left shoulder [M75.42]  Referring Physician:  Anat Thompson MD MD Orders:  PT Eval and Treat   Physician Guidelines:  Weeks 0 - 1 (2022 - 2022): Sling with abduction pillow at all times except while showering and completing exercises; Full wrist and elbow ROM; Scapular exercises   Weeks 2 - 6 (2022 - 8/10/2022): D/C abduction pillow at 4 weeks. May begin weaning out of sling completely between 4 and 6 weeks unless otherwise specified; Forward flexion: 0-90°, external rotation: 0-30° -All ROM is passive; Progress to full by week 6; May begin isometric strengthening at week 6   Weeks 7 - 12 (2022- 2022): Full PROM in all directions; Progression of AAROM and AROM as tolerated ; May progress cuff strengthening advancing to dynamic strengthening; Scapular stabilization and proprioceptive exercises beginning week 10   Months 3+ (after 2022):  Light plyometrics may begin at month 3 or 4; Sport specific/functional exercises at month 4   Return MD Appt:  10/18/2022  Date of Onset:  Onset Date: 07/06/22 (post op)   s/p L shoulder rotator cuff repair (supraspinatus), distal clavicle resection, subacromial decompression, debridement (labrum, glenohumeral capsule, and biceps tenotomy)  Allergies:  Pravastatin  Restrictions/Precautions:    Restrictions/Precautions: Surgical protocol  No data recorded   Medications Last Reviewed:  9/29/2022      OBJECTIVE     Observation/Orthostatic Postural Assessment:          Patient with abduction sling don L UE- adjusted for appropriate fit. Forward head, rounded shoulders, L UE held in adduction with scapular elevation and protraction noted. Post op dressing removed and steri- strips intact. Surrounding skin was wiped with alcohol wipe to remove ink and adhesive as best as possible. Skin intact and incisions sites healing well, no signs/ symptoms of infection/ DVT noted. Steri-strips covered with tegaderm and patient advised to keep covering intact until next MD visit. 9/6/2022: No sling. Good arm swing with gait. Incision sites well healed. Palpation:          Minimal (from moderate) tenderness palpation of gross L shoulder as expected following above listed surgical procedure. Mild warmth and edema noted- no signs/ symptoms of infection or DVT noted. ROM:          NT = not tested  AROM/ PROM Left (degrees) Right (degrees)   Shoulder Flexion 155 (from 125) 165   Shoulder Abduction 115 (from 80) 165   Shoulder Internal Rotation  80 (from 65) 80   Functional Internal Rotation L5  (from NT) L1   (from NT)   Shoulder External Rotation 70 (from 50) 85   Functional External Rotation C3  (from NT) T2   (from NT)     Strength:     Motion Tested Left   (*/5) Right  (*/5)   Shoulder Flexion 4+ (from NT) 5   Scaption 4 (from NT) 5   Shoulder Abduction 4+ (from NT) 5   Shoulder Internal Rotation  4+ (from NT) 5   Shoulder External Rotation 4+ (from NT) 5   Elbow Flexion 5 (from NT) 5   Elbow Extension 5 (from NT) 5 Functional Mobility:         Patient requires assistance for dressing/ bathing. Unable to return to driving. Difficulty sleeping, but has returned to his bed instead of the recliner due to comfort. Unable to return to hobbies including golf and wood working.  9/29/2022: Patient independent with daily activities. Sleeping well with no L shoulder problems. Able to do yard work. Has not yet returned to wood working or golfing. Balance:          Sitting and standing balance grossly intact. ASSESSMENT   Initial Assessment:  Mr. Goldie Hodgson is a 69 y.o. male presenting to physical therapy with complaints of L shoulder pain and stiffness s/p L shoulder rotator cuff repair (supraspinatus), distal clavicle resection, subacromial decompression, and extensive debridement (labrum, glenohumeral capsule, and biceps tenotomy) on 7/6/2022. Patient rates his pain about 3/10 currently describing a dull/ aching sensation gross L shoulder. He reports pain up to 6/10 with motion and was advised to try not to actively move his arm. He has been taking pain medication as needed and using ice at home. Patient reports being eager to sleep better and trying to sleep in his bed due to improved comfort compared to a recliner. Spoke with patient about positioning and propping with pillows and advised to continue wearing sling at all times except for exercise and bathing. Patient seems motivated and eager to return to his prior level of function and independence. Patient presents with increased pain, decreased strength, decreased ROM, decreased flexibility, impaired gait, impaired posture, impaired overhead reach, impaired transfer ability, decreased activity tolerance, and overall impaired functional mobility. Patient is a good candidate for skilled physical therapy interventions to include manual therapy, therapeutic exercise, transfer training, postural re-education, body mechanics training, and pain modalities as needed .     Progress Note/ Discharge 9/292022: Patient has been seen for 20 sessions of physical therapy from 7/8/2022 to 9/29/2022. He reports feeling about 80% better overall with improved ROM, strength, and overall activities/ mobility. Patient with some limitations with full L shoulder flexion, but continuing to stretch at home. Spoke with him regarding progression of strengthening and gradual return to activities such as golf and wood working. Patient would like to continue therapy on his own at home. Reviewed HEP and progression and advised patient to contact our office with any questions or concerns. Patient safe for discharge to home program at this time. Problem List: (Impacting functional limitations): Body Structures, Functions, Activity Limitations Requiring Skilled Therapeutic Intervention: Decreased functional mobility ; Decreased ADL status; Decreased ROM; Decreased body mechanics; Decreased tolerance to work activity; Decreased strength; Decreased endurance; Decreased coordination; Increased pain; Decreased posture     Therapy Prognosis:   Therapy Prognosis: Good     PLAN   Effective Dates: 7/8/2022 TO Plan of Care/Certification Expiration Date: 09/30/22 (start of plan of care 7/8/2022)     Frequency/Duration: Plan Frequency: Patient discharged to [facility]     Interventions Planned (Treatment may consist of any combination of the following):    Current Treatment Recommendations: Strengthening; ROM; Functional mobility training; Transfer training; ADL/Self-care training; Neuromuscular re-education; Manual Therapy - Soft Tissue Mobilization; Pain management; Return to work related activity; Home exercise program; Safety education & training; Modalities; Positioning; Integrated dry needling;  Therapeutic activities     GOALS: (Goals have been discussed and agreed upon with patient.)  Short-Term Functional Goals: Time Frame: 7/82022  to 8/15/2022  Patient demonstrates independence with home exercise program without verbal cueing provided by therapist. -GOAL MET  Patient will report no more than 1/10 L shoulder pain at rest in order to demonstrate improved self pain control and tolerance. -GOAL MET  Patient will be educated in and demonstrate improved upright posture including decreased forward head and scapular protraction to improve clearance for overhead activities. -GOAL MET  Patient will be educated in use of pillows and safe sleeping positions in order to improve sleeping pattern for health and wellness. -GOAL MET  Discharge Goals: Time Frame: 7/8/2022  to 9/30/2022  Patient will improve gross L shoulder elevation ROM to at least 155 degrees in order to improve overhead reach. -GOAL MET  Patient will improve L shoulder functional internal and external ROM to at least L5 and C7, respectively, in order to improve ability to dress and bathe. -NOT MET  Patient will improve gross L shoulder strength to at least 4/5 in order to demonstrate progress towards full ROM and prior hobbies. -GOAL MET  Patient will be able to gradually return to golf and wood working with no complaints of pain in order to progress back to prior level of activities. -NOT MET  Patient will improve Disability of the Arm, Shoulder, and Hand score to 17/55 from 34/55. -GOAL MET         Outcome Measure: Tool Used: Disabilities of the Arm, Shoulder and Hand (DASH) Questionnaire - Quick Version  Score:  Initial: 34/55  Most Recent: 26/55 (Date: 8/2/2022 )                      21/55 (Date: 9/6/2022)                      16/55 (Date: 9/29/2022)   Interpretation of Score: The DASH is designed to measure the activities of daily living in person's with upper extremity dysfunction or pain. Each section is scored on a 1-5 scale, 5 representing the greatest disability. The scores of each section are added together for a total score of 55.         Reason For Services/Other Comments:  > Patient discharged to [facility]      Total Duration:   Time In: 1007  Time Out: [facility]'s therapy, I certify that the treatment plan above will be carried out by a therapist or under their direction. Thank you for this referral,  Saúl Estrada, PT     Referring Physician Signature: Ioana Lyman MD No Signature is Required for this note.         Post Session Pain  Charge Capture  PT Visit Info  POC Link  Treatment Note Link  MD Guidelines  MyChart

## 2022-10-18 ENCOUNTER — OFFICE VISIT (OUTPATIENT)
Dept: ORTHOPEDIC SURGERY | Age: 77
End: 2022-10-18
Payer: MEDICARE

## 2022-10-18 DIAGNOSIS — M75.42 IMPINGEMENT SYNDROME OF LEFT SHOULDER: ICD-10-CM

## 2022-10-18 DIAGNOSIS — M75.112 NONTRAUMATIC INCOMPLETE TEAR OF LEFT ROTATOR CUFF: ICD-10-CM

## 2022-10-18 DIAGNOSIS — Z09 S/P ORTHOPEDIC SURGERY, FOLLOW-UP EXAM: Primary | ICD-10-CM

## 2022-10-18 DIAGNOSIS — M75.22 BICEPS TENDINITIS OF LEFT SHOULDER: ICD-10-CM

## 2022-10-18 PROCEDURE — 1123F ACP DISCUSS/DSCN MKR DOCD: CPT | Performed by: ORTHOPAEDIC SURGERY

## 2022-10-18 PROCEDURE — 99213 OFFICE O/P EST LOW 20 MIN: CPT | Performed by: ORTHOPAEDIC SURGERY

## 2022-10-18 NOTE — PROGRESS NOTES
Name: Lakshmi Scott  YOB: 1945  Gender: male  MRN: [id number]      CC: Shoulder Pain (L shoulder)       HPI: Lakshmi Scott is a 69 y.o. male who returns for follow up on his left shoulder. He is 3.5 months s/p Left Shoulder Arthroscopic Rotator Cuff Repair, distal clavicle resection, subacromial decompression with extensive debridement including labrum, glenohumeral capsule, and biceps tenotomy. He is doing well overall but still struggles with some end range of motion and reports pain in the end range of shoulder flexion as well. He has been discharged from formal physical therapy and home exercise program which he notes compliance with. Physical Examination:  General: no acute distress  Lungs: breathing easily  CV: regular rhythm by pulse  Left Shoulder: No obvious deformity the biceps. No tenderness to palpation. Active forward flexion to 170 degrees with minimal pain in the extreme. External rotation with elbows at side equal bilaterally. Active external rotation to the back of the head, internal rotation to L1. External rotation with elbows at side resisted range of motion 5/5 strength. Minimal pain with empty can testing but 5/5 strength in the empty can position. No pain with Polo's, negative speeds. Assessment:     ICD-10-CM    1. S/P orthopedic surgery, follow-up exam  Z09       2. Biceps tendinitis of left shoulder  M75.22       3. Impingement syndrome of left shoulder  M75.42       4. Nontraumatic incomplete tear of left rotator cuff  M75.112           Plan:   Is progressing well status post the above procedure. He has been discharged from formal physical therapy and has a home exercise program that he notes compliance with. I encouraged him to continue with his home exercise program and encouraged him that his internal rotation should return with the exercises he has been provided.        Jermaine Montalvo NP dictating as a scribe for Alena Blinks, MD Jeanmarie Mayer.  Aurora Rodriguez MD, [facility]

## 2023-01-06 ENCOUNTER — OFFICE VISIT (OUTPATIENT)
Dept: FAMILY MEDICINE CLINIC | Facility: CLINIC | Age: 78
End: 2023-01-06
Payer: MEDICARE

## 2023-01-06 VITALS
BODY MASS INDEX: 33 KG/M2 | DIASTOLIC BLOOD PRESSURE: 68 MMHG | SYSTOLIC BLOOD PRESSURE: 132 MMHG | OXYGEN SATURATION: 95 % | WEIGHT: 222.8 LBS | RESPIRATION RATE: 16 BRPM | TEMPERATURE: 97.2 F | HEIGHT: 69 IN | HEART RATE: 65 BPM

## 2023-01-06 DIAGNOSIS — E66.09 CLASS 1 OBESITY DUE TO EXCESS CALORIES WITH SERIOUS COMORBIDITY AND BODY MASS INDEX (BMI) OF 32.0 TO 32.9 IN ADULT: ICD-10-CM

## 2023-01-06 DIAGNOSIS — H92.03 OTALGIA OF BOTH EARS: Primary | ICD-10-CM

## 2023-01-06 PROBLEM — E66.811 CLASS 1 OBESITY DUE TO EXCESS CALORIES WITH SERIOUS COMORBIDITY AND BODY MASS INDEX (BMI) OF 32.0 TO 32.9 IN ADULT: Status: ACTIVE | Noted: 2023-01-06

## 2023-01-06 PROCEDURE — 99213 OFFICE O/P EST LOW 20 MIN: CPT | Performed by: FAMILY MEDICINE

## 2023-01-06 PROCEDURE — 3078F DIAST BP <80 MM HG: CPT | Performed by: FAMILY MEDICINE

## 2023-01-06 PROCEDURE — 3075F SYST BP GE 130 - 139MM HG: CPT | Performed by: FAMILY MEDICINE

## 2023-01-06 PROCEDURE — 1123F ACP DISCUSS/DSCN MKR DOCD: CPT | Performed by: FAMILY MEDICINE

## 2023-01-06 ASSESSMENT — PATIENT HEALTH QUESTIONNAIRE - PHQ9
1. LITTLE INTEREST OR PLEASURE IN DOING THINGS: 0
SUM OF ALL RESPONSES TO PHQ QUESTIONS 1-9: 0
SUM OF ALL RESPONSES TO PHQ QUESTIONS 1-9: 0
SUM OF ALL RESPONSES TO PHQ9 QUESTIONS 1 & 2: 0
2. FEELING DOWN, DEPRESSED OR HOPELESS: 0
SUM OF ALL RESPONSES TO PHQ QUESTIONS 1-9: 0
SUM OF ALL RESPONSES TO PHQ QUESTIONS 1-9: 0

## 2023-01-06 NOTE — PROGRESS NOTES
[address] [facility] [address]  Phone: [phone] Fax [phone]  Dedra Lewis MD  2023           Mr. Deepak Mccarthy  is a 69y.o.  year old  male patient who comes in complaining of his ears feeling plugged. He does wear hearing aids but they are not working as well. His hearing evaluator said he had ear wax. Also would like to do something that was safe that would help him lose weight. Mr. Deepak Mccarthy  has  has a past medical history of Arthritis, Hyperlipidemia, Hypertension, and Type II or unspecified type diabetes mellitus without mention of complication, not stated as uncontrolled. Mr. Deepak Mccarthy  has  has a past surgical history that includes orthopedic surgery; pr unlisted procedure abdomen peritoneum & omentum; orthopedic surgery (2013); heent (2014); heent; Limbal stem cell transplant (Left); Colonoscopy; and Shoulder arthroscopy (Left, 2022). Mr. Deepak Mccarthy   Current Outpatient Medications   Medication Sig Dispense Refill    losartan-hydroCHLOROthiazide (HYZAAR) 100-25 MG per tablet TAKE 1 TABLET DAILY 90 tablet 3    metoprolol tartrate (LOPRESSOR) 25 MG tablet TAKE 1 TABLET TWICE A  tablet 3    Coenzyme Q10 (COQ10 PO) Take by mouth daily       Turmeric 400 MG CAPS Take by mouth      Multiple Vitamin (MULTIVITAMIN ADULT PO) Take by mouth daily      atorvastatin (LIPITOR) 20 MG tablet TAKE 1 TABLET DAILY       No current facility-administered medications for this visit.        Mr. Beckie Arnold History     Socioeconomic History    Marital status:      Spouse name: None    Number of children: None    Years of education: None    Highest education level: None   Tobacco Use    Smoking status: Former     Types: Cigarettes     Quit date: 3/12/1980     Years since quittin.8    Smokeless tobacco: Never   Vaping Use    Vaping Use: Never used   Substance and Sexual Activity    Alcohol use: No    Drug use: Never     Social Determinants of Health     Physical Activity: Insufficiently Active    Days of Exercise per Week: 1 day    Minutes of Exercise per Session: 20 min       Mr. Alie Becerril   Family History   Problem Relation Age of Onset    Dementia Mother     Hypertension Father     Cancer Father         prostate    Heart Disease Brother     Osteoarthritis Mother             Mr. Alie Becerril  has the following allergies: Allergies   Allergen Reactions    Pravastatin Diarrhea       /68   Pulse 65   Temp 97.2 °F (36.2 °C)   Resp 16   Ht 5' 9\" (1.753 m)   Wt 222 lb 12.8 oz (101.1 kg)   SpO2 95%   BMI 32.90 kg/m²     HEENT: Normocephalic, atraumatic, pupils equal and reactive to light. Tympanic membranes obscured by wax but washed out with good results, TM\"s intact without erythema or edema. Anjelica Aguila was seen today for ear fullness. Diagnoses and all orders for this visit:    Otalgia of both ears    Class 1 obesity due to excess calories with serious comorbidity and body mass index (BMI) of 32.0 to 32.9 in adult      Hydrogen peroxide to both ears once weekly to keep them clean. Did recommend Plenity to him and he will research about it and see if he wants me to prescribe it.   Luz Maria Castañeda MD

## 2023-03-21 DIAGNOSIS — I10 PRIMARY HYPERTENSION: ICD-10-CM

## 2023-03-21 RX ORDER — LOSARTAN POTASSIUM AND HYDROCHLOROTHIAZIDE 25; 100 MG/1; MG/1
TABLET ORAL
Qty: 90 TABLET | Refills: 1 | Status: SHIPPED | OUTPATIENT
Start: 2023-03-21

## 2023-03-21 RX ORDER — ATORVASTATIN CALCIUM 20 MG/1
TABLET, FILM COATED ORAL
Qty: 90 TABLET | Refills: 1 | Status: SHIPPED | OUTPATIENT
Start: 2023-03-21

## 2023-03-21 NOTE — TELEPHONE ENCOUNTER
Last OV 1/6/23.  Next OV 8/28/23 Hypercholesterolemia Monitoring: I explained this is common when taking isotretinoin. We will monitor closely.

## 2023-04-17 RX ORDER — ATORVASTATIN CALCIUM 20 MG/1
20 TABLET, FILM COATED ORAL DAILY
Qty: 90 TABLET | Refills: 3 | Status: SHIPPED | OUTPATIENT
Start: 2023-04-17

## 2023-06-12 DIAGNOSIS — I10 PRIMARY HYPERTENSION: ICD-10-CM

## 2023-08-21 ENCOUNTER — NURSE ONLY (OUTPATIENT)
Dept: FAMILY MEDICINE CLINIC | Facility: CLINIC | Age: 78
End: 2023-08-21

## 2023-08-21 DIAGNOSIS — I10 PRIMARY HYPERTENSION: Primary | ICD-10-CM

## 2023-08-21 DIAGNOSIS — E78.00 PURE HYPERCHOLESTEROLEMIA: ICD-10-CM

## 2023-08-21 DIAGNOSIS — Z00.00 MEDICARE ANNUAL WELLNESS VISIT, SUBSEQUENT: ICD-10-CM

## 2023-08-21 DIAGNOSIS — E11.9 TYPE 2 DIABETES MELLITUS WITHOUT COMPLICATION, WITHOUT LONG-TERM CURRENT USE OF INSULIN (HCC): ICD-10-CM

## 2023-08-21 LAB
ALBUMIN SERPL-MCNC: 3.5 G/DL (ref 3.2–4.6)
ALBUMIN/GLOB SERPL: 1 (ref 0.4–1.6)
ALP SERPL-CCNC: 80 U/L (ref 50–136)
ALT SERPL-CCNC: 39 U/L (ref 12–65)
ANION GAP SERPL CALC-SCNC: 6 MMOL/L (ref 2–11)
AST SERPL-CCNC: 22 U/L (ref 15–37)
BILIRUB SERPL-MCNC: 0.6 MG/DL (ref 0.2–1.1)
BUN SERPL-MCNC: 16 MG/DL (ref 8–23)
CALCIUM SERPL-MCNC: 9.3 MG/DL (ref 8.3–10.4)
CHLORIDE SERPL-SCNC: 107 MMOL/L (ref 101–110)
CHOLEST SERPL-MCNC: 139 MG/DL
CO2 SERPL-SCNC: 28 MMOL/L (ref 21–32)
CREAT SERPL-MCNC: 1.3 MG/DL (ref 0.8–1.5)
EST. AVERAGE GLUCOSE BLD GHB EST-MCNC: 134 MG/DL
GLOBULIN SER CALC-MCNC: 3.6 G/DL (ref 2.8–4.5)
GLUCOSE SERPL-MCNC: 112 MG/DL (ref 65–100)
HBA1C MFR BLD: 6.3 % (ref 4.8–5.6)
HDLC SERPL-MCNC: 38 MG/DL (ref 40–60)
HDLC SERPL: 3.7
LDLC SERPL CALC-MCNC: 66.2 MG/DL
POTASSIUM SERPL-SCNC: 3.9 MMOL/L (ref 3.5–5.1)
PROT SERPL-MCNC: 7.1 G/DL (ref 6.3–8.2)
SODIUM SERPL-SCNC: 141 MMOL/L (ref 133–143)
TRIGL SERPL-MCNC: 174 MG/DL (ref 35–150)
VLDLC SERPL CALC-MCNC: 34.8 MG/DL (ref 6–23)

## 2023-08-23 ENCOUNTER — OFFICE VISIT (OUTPATIENT)
Dept: ORTHOPEDIC SURGERY | Age: 78
End: 2023-08-23
Payer: MEDICARE

## 2023-08-23 DIAGNOSIS — M25.551 BILATERAL HIP PAIN: Primary | ICD-10-CM

## 2023-08-23 DIAGNOSIS — M25.552 BILATERAL HIP PAIN: Primary | ICD-10-CM

## 2023-08-23 PROCEDURE — 99204 OFFICE O/P NEW MOD 45 MIN: CPT | Performed by: ORTHOPAEDIC SURGERY

## 2023-08-23 PROCEDURE — 1123F ACP DISCUSS/DSCN MKR DOCD: CPT | Performed by: ORTHOPAEDIC SURGERY

## 2023-08-23 NOTE — PROGRESS NOTES
Name: Tony Lamar  YOB: 1945  Gender: male  MRN: [id number]    CC:   Chief Complaint   Patient presents with    Hip Pain     Bilateral hip pain-getting xrays today         HPI:   The pain has been present for a few months. He has seen a chiropractor in the past and had some benefit and relief  It hurts to at night when sleeping. There was not an acute injury to the hip. The pain is located over the buttock and thigh  It hurts to walk and pain worsens with increased distance. The pain does not radiate down the leg. Numbness and tingling are not noted. The patient is now having difficulty putting socks and shoes on. Treatment so far has been anti-inflammatory medications but just on occasion  Partial knee replacement by Dr. Marquis Seay in 2013 is fared well. Review of Systems  As per HPI. Pertinent positives and negatives are addressed with the patient, particularly those related to musculoskeletal concerns. Non-orthopaedic concerns were referred back to the primary care physician.       [id number] [facility]:    Current Outpatient Medications:     metoprolol tartrate (LOPRESSOR) 25 MG tablet, Take 1 tablet by mouth 2 times daily, Disp: 180 tablet, Rfl: 1    atorvastatin (LIPITOR) 20 MG tablet, Take 1 tablet by mouth daily, Disp: 90 tablet, Rfl: 3    losartan-hydroCHLOROthiazide (HYZAAR) 100-25 MG per tablet, TAKE 1 TABLET DAILY, Disp: 90 tablet, Rfl: 1    Coenzyme Q10 (COQ10 PO), Take by mouth daily , Disp: , Rfl:     Turmeric 400 MG CAPS, Take by mouth, Disp: , Rfl:     Multiple Vitamin (MULTIVITAMIN ADULT PO), Take by mouth daily, Disp: , Rfl:   Allergies   Allergen Reactions    Pravastatin Diarrhea     Past Medical History:   Diagnosis Date    Arthritis     Hyperlipidemia     Hypertension     medication    Type II or unspecified type diabetes mellitus without mention of complication, not stated as uncontrolled 03/09/2015    diet controlled and prediabetes per patient; avg fasting  - 120

## 2023-08-28 ENCOUNTER — OFFICE VISIT (OUTPATIENT)
Dept: FAMILY MEDICINE CLINIC | Facility: CLINIC | Age: 78
End: 2023-08-28
Payer: MEDICARE

## 2023-08-28 VITALS
RESPIRATION RATE: 16 BRPM | OXYGEN SATURATION: 97 % | DIASTOLIC BLOOD PRESSURE: 73 MMHG | SYSTOLIC BLOOD PRESSURE: 157 MMHG | HEIGHT: 69 IN | WEIGHT: 221 LBS | BODY MASS INDEX: 32.73 KG/M2 | HEART RATE: 98 BPM | TEMPERATURE: 99.5 F

## 2023-08-28 DIAGNOSIS — I10 PRIMARY HYPERTENSION: ICD-10-CM

## 2023-08-28 DIAGNOSIS — E78.00 PURE HYPERCHOLESTEROLEMIA: ICD-10-CM

## 2023-08-28 DIAGNOSIS — E66.09 CLASS 1 OBESITY DUE TO EXCESS CALORIES WITH SERIOUS COMORBIDITY AND BODY MASS INDEX (BMI) OF 32.0 TO 32.9 IN ADULT: ICD-10-CM

## 2023-08-28 DIAGNOSIS — Z00.00 MEDICARE ANNUAL WELLNESS VISIT, SUBSEQUENT: Primary | ICD-10-CM

## 2023-08-28 DIAGNOSIS — E11.9 TYPE 2 DIABETES MELLITUS WITHOUT COMPLICATION, WITHOUT LONG-TERM CURRENT USE OF INSULIN (HCC): ICD-10-CM

## 2023-08-28 DIAGNOSIS — Z94.84 HX OF STEM CELL TRANSPLANT (HCC): ICD-10-CM

## 2023-08-28 DIAGNOSIS — G89.29 CHRONIC LEFT-SIDED LOW BACK PAIN WITHOUT SCIATICA: ICD-10-CM

## 2023-08-28 DIAGNOSIS — M54.50 CHRONIC LEFT-SIDED LOW BACK PAIN WITHOUT SCIATICA: ICD-10-CM

## 2023-08-28 PROBLEM — M75.112 NONTRAUMATIC INCOMPLETE TEAR OF LEFT ROTATOR CUFF: Status: RESOLVED | Noted: 2022-03-22 | Resolved: 2023-08-28

## 2023-08-28 PROBLEM — M75.22 BICEPS TENDINITIS OF LEFT SHOULDER: Status: RESOLVED | Noted: 2022-03-22 | Resolved: 2023-08-28

## 2023-08-28 PROCEDURE — 99213 OFFICE O/P EST LOW 20 MIN: CPT | Performed by: FAMILY MEDICINE

## 2023-08-28 PROCEDURE — 3044F HG A1C LEVEL LT 7.0%: CPT | Performed by: FAMILY MEDICINE

## 2023-08-28 PROCEDURE — 3077F SYST BP >= 140 MM HG: CPT | Performed by: FAMILY MEDICINE

## 2023-08-28 PROCEDURE — 3078F DIAST BP <80 MM HG: CPT | Performed by: FAMILY MEDICINE

## 2023-08-28 PROCEDURE — G0439 PPPS, SUBSEQ VISIT: HCPCS | Performed by: FAMILY MEDICINE

## 2023-08-28 PROCEDURE — 1123F ACP DISCUSS/DSCN MKR DOCD: CPT | Performed by: FAMILY MEDICINE

## 2023-08-28 RX ORDER — LOSARTAN POTASSIUM AND HYDROCHLOROTHIAZIDE 25; 100 MG/1; MG/1
1 TABLET ORAL DAILY
Qty: 90 TABLET | Refills: 3 | Status: SHIPPED | OUTPATIENT
Start: 2023-08-28

## 2023-08-28 RX ORDER — MELOXICAM 7.5 MG/1
7.5 TABLET ORAL 2 TIMES DAILY
Qty: 60 TABLET | Refills: 3 | Status: SHIPPED | OUTPATIENT
Start: 2023-08-28

## 2023-08-28 SDOH — ECONOMIC STABILITY: HOUSING INSECURITY
IN THE LAST 12 MONTHS, WAS THERE A TIME WHEN YOU DID NOT HAVE A STEADY PLACE TO SLEEP OR SLEPT IN A SHELTER (INCLUDING NOW)?: NO

## 2023-08-28 SDOH — ECONOMIC STABILITY: INCOME INSECURITY: HOW HARD IS IT FOR YOU TO PAY FOR THE VERY BASICS LIKE FOOD, HOUSING, MEDICAL CARE, AND HEATING?: NOT HARD AT ALL

## 2023-08-28 SDOH — ECONOMIC STABILITY: FOOD INSECURITY: WITHIN THE PAST 12 MONTHS, THE FOOD YOU BOUGHT JUST DIDN'T LAST AND YOU DIDN'T HAVE MONEY TO GET MORE.: NEVER TRUE

## 2023-08-28 SDOH — ECONOMIC STABILITY: FOOD INSECURITY: WITHIN THE PAST 12 MONTHS, YOU WORRIED THAT YOUR FOOD WOULD RUN OUT BEFORE YOU GOT MONEY TO BUY MORE.: NEVER TRUE

## 2023-08-28 ASSESSMENT — PATIENT HEALTH QUESTIONNAIRE - PHQ9
1. LITTLE INTEREST OR PLEASURE IN DOING THINGS: 0
SUM OF ALL RESPONSES TO PHQ QUESTIONS 1-9: 0
2. FEELING DOWN, DEPRESSED OR HOPELESS: 0
SUM OF ALL RESPONSES TO PHQ QUESTIONS 1-9: 0
SUM OF ALL RESPONSES TO PHQ QUESTIONS 1-9: 0
SUM OF ALL RESPONSES TO PHQ9 QUESTIONS 1 & 2: 0
SUM OF ALL RESPONSES TO PHQ QUESTIONS 1-9: 0

## 2023-08-28 NOTE — PATIENT INSTRUCTIONS
The [employer] Data on Aging online. You need 8024-1784 mg of calcium and 0991-1427 IU of vitamin D per day. It is possible to meet your calcium requirement with diet alone, but a vitamin D supplement is usually necessary to meet this goal.  When exposed to the sun, use a sunscreen that protects against both UVA and UVB radiation with an SPF of 30 or greater. Reapply every 2 to 3 hours or after sweating, drying off with a towel, or swimming. Always wear a seat belt when traveling in a car. Always wear a helmet when riding a bicycle or motorcycle.

## 2023-08-29 LAB
CREAT UR-MCNC: 175 MG/DL
MICROALBUMIN UR-MCNC: 1.63 MG/DL
MICROALBUMIN/CREAT UR-RTO: 9 MG/G (ref 0–30)

## 2024-02-19 ENCOUNTER — TELEPHONE (OUTPATIENT)
Dept: FAMILY MEDICINE CLINIC | Facility: CLINIC | Age: 79
End: 2024-02-19

## 2024-02-19 DIAGNOSIS — E78.00 PURE HYPERCHOLESTEROLEMIA: Primary | ICD-10-CM

## 2024-02-19 DIAGNOSIS — E11.9 TYPE 2 DIABETES MELLITUS WITHOUT COMPLICATION, WITHOUT LONG-TERM CURRENT USE OF INSULIN (HCC): ICD-10-CM

## 2024-02-21 ENCOUNTER — NURSE ONLY (OUTPATIENT)
Dept: FAMILY MEDICINE CLINIC | Facility: CLINIC | Age: 79
End: 2024-02-21

## 2024-02-21 DIAGNOSIS — E78.00 PURE HYPERCHOLESTEROLEMIA: Primary | ICD-10-CM

## 2024-02-21 DIAGNOSIS — E11.9 TYPE 2 DIABETES MELLITUS WITHOUT COMPLICATION, WITHOUT LONG-TERM CURRENT USE OF INSULIN (HCC): ICD-10-CM

## 2024-02-21 LAB
ALBUMIN SERPL-MCNC: 4.1 G/DL (ref 3.2–4.6)
ALBUMIN/GLOB SERPL: 1.2 (ref 0.4–1.6)
ALP SERPL-CCNC: 72 U/L (ref 50–136)
ALT SERPL-CCNC: 45 U/L (ref 12–65)
ANION GAP SERPL CALC-SCNC: 5 MMOL/L (ref 2–11)
AST SERPL-CCNC: 29 U/L (ref 15–37)
BILIRUB SERPL-MCNC: 0.8 MG/DL (ref 0.2–1.1)
BUN SERPL-MCNC: 21 MG/DL (ref 8–23)
CALCIUM SERPL-MCNC: 10 MG/DL (ref 8.3–10.4)
CHLORIDE SERPL-SCNC: 106 MMOL/L (ref 103–113)
CHOLEST SERPL-MCNC: 132 MG/DL
CO2 SERPL-SCNC: 30 MMOL/L (ref 21–32)
CREAT SERPL-MCNC: 1.3 MG/DL (ref 0.8–1.5)
CREAT UR-MCNC: 243 MG/DL
GLOBULIN SER CALC-MCNC: 3.5 G/DL (ref 2.8–4.5)
GLUCOSE SERPL-MCNC: 113 MG/DL (ref 65–100)
HDLC SERPL-MCNC: 34 MG/DL (ref 40–60)
HDLC SERPL: 3.9
LDLC SERPL CALC-MCNC: 64 MG/DL
MICROALBUMIN UR-MCNC: 3.23 MG/DL
MICROALBUMIN/CREAT UR-RTO: 13 MG/G (ref 0–30)
POTASSIUM SERPL-SCNC: 4 MMOL/L (ref 3.5–5.1)
PROT SERPL-MCNC: 7.6 G/DL (ref 6.3–8.2)
SODIUM SERPL-SCNC: 141 MMOL/L (ref 136–146)
TRIGL SERPL-MCNC: 170 MG/DL (ref 35–150)
VLDLC SERPL CALC-MCNC: 34 MG/DL (ref 6–23)

## 2024-02-22 LAB
EST. AVERAGE GLUCOSE BLD GHB EST-MCNC: 131 MG/DL
HBA1C MFR BLD: 6.2 % (ref 4.8–5.6)

## 2024-02-28 ENCOUNTER — OFFICE VISIT (OUTPATIENT)
Dept: FAMILY MEDICINE CLINIC | Facility: CLINIC | Age: 79
End: 2024-02-28
Payer: MEDICARE

## 2024-02-28 VITALS
BODY MASS INDEX: 32.54 KG/M2 | WEIGHT: 219.7 LBS | RESPIRATION RATE: 16 BRPM | HEIGHT: 69 IN | DIASTOLIC BLOOD PRESSURE: 78 MMHG | HEART RATE: 53 BPM | SYSTOLIC BLOOD PRESSURE: 160 MMHG | OXYGEN SATURATION: 95 % | TEMPERATURE: 97.5 F

## 2024-02-28 DIAGNOSIS — E11.9 TYPE 2 DIABETES MELLITUS WITHOUT COMPLICATION, WITHOUT LONG-TERM CURRENT USE OF INSULIN (HCC): ICD-10-CM

## 2024-02-28 DIAGNOSIS — I10 PRIMARY HYPERTENSION: Primary | ICD-10-CM

## 2024-02-28 DIAGNOSIS — Z12.5 PROSTATE CANCER SCREENING: ICD-10-CM

## 2024-02-28 DIAGNOSIS — M19.90 OSTEOARTHRITIS, UNSPECIFIED OSTEOARTHRITIS TYPE, UNSPECIFIED SITE: ICD-10-CM

## 2024-02-28 DIAGNOSIS — E78.00 PURE HYPERCHOLESTEROLEMIA: ICD-10-CM

## 2024-02-28 DIAGNOSIS — N18.31 STAGE 3A CHRONIC KIDNEY DISEASE (HCC): ICD-10-CM

## 2024-02-28 PROBLEM — N18.30 CHRONIC RENAL DISEASE, STAGE III (HCC): Status: ACTIVE | Noted: 2024-02-28

## 2024-02-28 PROCEDURE — 3077F SYST BP >= 140 MM HG: CPT | Performed by: PHYSICIAN ASSISTANT

## 2024-02-28 PROCEDURE — 3044F HG A1C LEVEL LT 7.0%: CPT | Performed by: PHYSICIAN ASSISTANT

## 2024-02-28 PROCEDURE — 99214 OFFICE O/P EST MOD 30 MIN: CPT | Performed by: PHYSICIAN ASSISTANT

## 2024-02-28 PROCEDURE — 1123F ACP DISCUSS/DSCN MKR DOCD: CPT | Performed by: PHYSICIAN ASSISTANT

## 2024-02-28 PROCEDURE — 3078F DIAST BP <80 MM HG: CPT | Performed by: PHYSICIAN ASSISTANT

## 2024-02-28 RX ORDER — VALSARTAN AND HYDROCHLOROTHIAZIDE 320; 25 MG/1; MG/1
1 TABLET, FILM COATED ORAL DAILY
Qty: 90 TABLET | Refills: 1 | Status: SHIPPED | OUTPATIENT
Start: 2024-02-28

## 2024-02-28 RX ORDER — MELOXICAM 7.5 MG/1
7.5 TABLET ORAL 2 TIMES DAILY
Qty: 60 TABLET | Refills: 3 | Status: SHIPPED | OUTPATIENT
Start: 2024-02-28

## 2024-02-28 RX ORDER — LOSARTAN POTASSIUM AND HYDROCHLOROTHIAZIDE 25; 100 MG/1; MG/1
1 TABLET ORAL DAILY
Qty: 90 TABLET | Refills: 3 | Status: CANCELLED | OUTPATIENT
Start: 2024-02-28

## 2024-02-28 RX ORDER — ATORVASTATIN CALCIUM 20 MG/1
20 TABLET, FILM COATED ORAL DAILY
Qty: 90 TABLET | Refills: 1 | Status: SHIPPED | OUTPATIENT
Start: 2024-02-28

## 2024-02-28 ASSESSMENT — ENCOUNTER SYMPTOMS
SHORTNESS OF BREATH: 0
GASTROINTESTINAL NEGATIVE: 1

## 2024-02-28 ASSESSMENT — PATIENT HEALTH QUESTIONNAIRE - PHQ9
SUM OF ALL RESPONSES TO PHQ QUESTIONS 1-9: 0
SUM OF ALL RESPONSES TO PHQ QUESTIONS 1-9: 0
2. FEELING DOWN, DEPRESSED OR HOPELESS: 0
SUM OF ALL RESPONSES TO PHQ QUESTIONS 1-9: 0
1. LITTLE INTEREST OR PLEASURE IN DOING THINGS: 0
SUM OF ALL RESPONSES TO PHQ QUESTIONS 1-9: 0
SUM OF ALL RESPONSES TO PHQ9 QUESTIONS 1 & 2: 0

## 2024-02-28 NOTE — PROGRESS NOTES
plan and follow up and given patient instructions.  Patient's questions are answered and we will follow up as indicated.    Dictated using voice recognition software.  Proof read but unrecognized errors may exist.    Follow-up and Dispositions    Return in about 6 months (around 8/28/2024) for AWV.         Yuval Jorgensen PA-C

## 2024-02-28 NOTE — PATIENT INSTRUCTIONS
*Discontinue the losartan-hydrochlorothiazide and replace with valsartan-hydrochlorothiazide 320-25 mg once daily.  A new prescription has been sent to your mail away pharmacy.  *Resume checking your blood pressure at home at least 3 to 4 days a week.  Record all readings bring these with you to next visit for review.  Let us know if readings are not consistently less than 140/90 after 3 to 4 weeks.  *You have Chronic Kidney Disease, Stage III. This is typical wear and tear on the kidneys. It requires us to monitor your kidney function closely, so that we do not see any worsening. Taking good care of your kidneys includes not smoking, keeping your blood pressure controlled, keeping you on your current blood pressure medication, and keeping your cholesterol under control. It is very important to generally avoid antiinflammatory drugs such as Ibuprofen (Motrin, Advil), Naproxen (Aleve), Celebrex, Mobic, etc. If we are able to do this well, the chances you will have kidney complications (like needing dialysis or even having to see a kidney doctor) is very low.  *Continue your efforts for low sugar, low-carb diet and exercise.  *Continue follow-up with ophthalmology regarding yearly diabetic eye exams.

## 2024-06-19 ENCOUNTER — OFFICE VISIT (OUTPATIENT)
Dept: ORTHOPEDIC SURGERY | Age: 79
End: 2024-06-19
Payer: MEDICARE

## 2024-06-19 DIAGNOSIS — M25.551 BILATERAL HIP PAIN: Primary | ICD-10-CM

## 2024-06-19 DIAGNOSIS — M25.552 BILATERAL HIP PAIN: Primary | ICD-10-CM

## 2024-06-19 DIAGNOSIS — M47.816 LUMBAR SPONDYLOSIS: ICD-10-CM

## 2024-06-19 PROCEDURE — 99214 OFFICE O/P EST MOD 30 MIN: CPT | Performed by: PHYSICIAN ASSISTANT

## 2024-06-19 PROCEDURE — 1123F ACP DISCUSS/DSCN MKR DOCD: CPT | Performed by: PHYSICIAN ASSISTANT

## 2024-06-19 NOTE — PROGRESS NOTES
Name: Thompson Victor  YOB: 1945  Gender: male  MRN: [id number]    CC:   Chief Complaint   Patient presents with    Knee Pain     Bilateral hip pain/l-spine-will xray today         HPI:   The pain has been present for over a year, is mild to moderate in severity, and is unchanged.  It hurts at night when sleeping.  There was not an acute injury to the hip.  The pain is located over the bilateral buttocks and low back.  Worse on left side compared to right.  It hurts to walk and pain worsens with increased distance.  He reports symptoms are alleviated by sitting down or leaning forward at the waist such as when pushing a buggy or leaning over a countertop.  The pain does not radiate down the leg.  Numbness and tingling are not noted.  The patient is not having difficulty putting socks and shoes on.        Treatment so far has been Tylenol, Mobic, and chiropractic care.    Review of Systems  As per HPI.  Pertinent positives and negatives are addressed with the patient, particularly those related to musculoskeletal concerns.  Non-orthopaedic concerns were referred back to the primary care physician.      PMFSH:    Current Outpatient Medications:     atorvastatin (LIPITOR) 20 MG tablet, Take 1 tablet by mouth daily, Disp: 90 tablet, Rfl: 1    meloxicam (MOBIC) 7.5 MG tablet, Take 1 tablet by mouth in the morning and at bedtime, Disp: 60 tablet, Rfl: 3    valsartan-hydroCHLOROthiazide (DIOVAN-HCT) 320-25 MG per tablet, Take 1 tablet by mouth daily, Disp: 90 tablet, Rfl: 1    losartan-hydroCHLOROthiazide (HYZAAR) 100-25 MG per tablet, Take 1 tablet by mouth daily, Disp: 90 tablet, Rfl: 3    metoprolol tartrate (LOPRESSOR) 25 MG tablet, Take 1 tablet by mouth 2 times daily, Disp: 180 tablet, Rfl: 3    Coenzyme Q10 (COQ10 PO), Take by mouth daily , Disp: , Rfl:     Turmeric 400 MG CAPS, Take by mouth, Disp: , Rfl:     Multiple Vitamin (MULTIVITAMIN ADULT PO), Take by mouth daily, Disp: , Rfl:   Allergies

## 2024-06-23 DIAGNOSIS — M19.90 OSTEOARTHRITIS, UNSPECIFIED OSTEOARTHRITIS TYPE, UNSPECIFIED SITE: ICD-10-CM

## 2024-06-24 RX ORDER — MELOXICAM 7.5 MG/1
TABLET ORAL
Qty: 60 TABLET | Refills: 3 | OUTPATIENT
Start: 2024-06-24

## 2024-07-11 ENCOUNTER — OFFICE VISIT (OUTPATIENT)
Age: 79
End: 2024-07-11

## 2024-07-11 DIAGNOSIS — M43.16 SPONDYLOLISTHESIS OF LUMBAR REGION: ICD-10-CM

## 2024-07-11 DIAGNOSIS — M51.36 DDD (DEGENERATIVE DISC DISEASE), LUMBAR: Primary | ICD-10-CM

## 2024-07-11 DIAGNOSIS — M47.816 FACET ARTHROPATHY, LUMBAR: ICD-10-CM

## 2024-07-11 RX ORDER — CEPHALEXIN 500 MG/1
CAPSULE ORAL
COMMUNITY
Start: 2024-05-08

## 2024-07-11 NOTE — PROGRESS NOTES
Name: Thompson Victor  YOB: 1945  Gender: male  MRN: [id number]    CC: Back Pain (Low back pain/buttock)       HPI: This is a 78 y.o. year old male who presents with greater than 1 year history low back pain into bilateral buttocks.  The pain is bilateral.  He describes the pain as a dull ache.  It is exacerbated after about 5 minutes of walking, or standing up straight.  If he extends the spine, pain is worse..  Sitting can alleviate the symptoms. There is no numbness or tingling noted. He recalls the symptoms have been present for more than a year.  He did see Kei Branch to check his hips without significant degenerative changes.  It was felt that the symptoms were coming from the lumbar spine.    This patient  has not had lumbar surgery in the past.     Prior treatment: Chiropractor 2 days/week for 1 year, Tylenol, Meloxicam             7/11/2024     8:55 AM   AMB PAIN ASSESSMENT   Location of Pain Back    Location Modifiers Left;Right   Severity of Pain 5   Quality of Pain Dull   Duration of Pain Persistent   Frequency of Pain Intermittent   Date Pain First Started 6/4/2023   Aggravating Factors Walking   Limiting Behavior Some   Relieving Factors Other (Comment)    Result of Injury No   Work-Related Injury No   Are there other pain locations you wish to document? No       Significant value              ROS/Meds/PSH/PMH/FH/SH: I personally reviewed the patient's collected intake data.  Below are the pertinents:    No Known Allergies        Current Outpatient Medications:     cephALEXin (KEFLEX) 500 MG capsule, TAKE 1 CAPSULE BY MOUTH THREE TIMES A DAY WITH FOOD X5 DAYS, Disp: , Rfl:     atorvastatin (LIPITOR) 20 MG tablet, Take 1 tablet by mouth daily, Disp: 90 tablet, Rfl: 1    meloxicam (MOBIC) 7.5 MG tablet, Take 1 tablet by mouth in the morning and at bedtime, Disp: 60 tablet, Rfl: 3    valsartan-hydroCHLOROthiazide (DIOVAN-HCT) 320-25 MG per tablet, Take 1 tablet by mouth daily, Disp: 90

## 2024-07-11 NOTE — PATIENT INSTRUCTIONS
Lumbar Stenosis    What is “lumbar stenosis”?  Stenosis is defined as the narrowing of the spinal canal. The term lumbar simply refers to the lowest 5 vertebrae in the spine.  Externally this corresponds to the “small of the back” just above your buttocks. Each lumbar vertebra has 3 tunnels which can be affected by stenosis.  The large tunnel in the middle of the vertebra is where the spinal cord and all of the spinal nerves are contained.  Also, a much smaller tunnel is on each side of the vertebra.  This is where the individual nerves exit the spine.  Narrowing of any of these tunnels can result in pressure on the spinal cord or spinal nerves.  Spinal stenosis may involve multiple levels of the spine or may be localized to a single level.                               What causes spinal stenosis?  Typically the tunnels in vertebrae are quite spacious and much larger than the spinal cord and nerves that pass through them.  However, some patients are genetically programmed to have smaller size tunnels in the spine, predisposing them to develop symptoms from spinal stenosis.    There are several other potential causes of spinal stenosis.  A single event, such as a disc herniation or a fracture can cause symptoms of stenosis.    But more often, it is caused by arthritic changes, such as bone spurs, thickened ligaments, joint laxity, and disc bulges.  This results in pinched spinal nerves which gives symptoms of buttock and leg pain and weakness.     What are the symptoms of spinal stenosis?  Patients will typically have low back pain along with pain in the buttocks and legs.  This usually affects patients more when they are standing or walking, and their pain is often relieved with sitting or lying. Many patients report that the decrease in their ability to walk is the most bothersome part of the condition.  And, some have found that leaning forward (such as using a cart while shopping) has enabled them to go

## 2024-07-21 DIAGNOSIS — I10 PRIMARY HYPERTENSION: ICD-10-CM

## 2024-07-22 RX ORDER — VALSARTAN AND HYDROCHLOROTHIAZIDE 320; 25 MG/1; MG/1
1 TABLET, FILM COATED ORAL DAILY
Qty: 90 TABLET | Refills: 3 | Status: SHIPPED | OUTPATIENT
Start: 2024-07-22

## 2024-08-02 ENCOUNTER — OFFICE VISIT (OUTPATIENT)
Age: 79
End: 2024-08-02
Payer: MEDICARE

## 2024-08-02 DIAGNOSIS — M51.36 DDD (DEGENERATIVE DISC DISEASE), LUMBAR: ICD-10-CM

## 2024-08-02 DIAGNOSIS — M43.16 SPONDYLOLISTHESIS OF LUMBAR REGION: Primary | ICD-10-CM

## 2024-08-02 DIAGNOSIS — M47.816 FACET ARTHROPATHY, LUMBAR: ICD-10-CM

## 2024-08-02 DIAGNOSIS — M48.062 LUMBAR STENOSIS WITH NEUROGENIC CLAUDICATION: ICD-10-CM

## 2024-08-02 PROCEDURE — 1123F ACP DISCUSS/DSCN MKR DOCD: CPT | Performed by: PHYSICIAN ASSISTANT

## 2024-08-02 PROCEDURE — 99214 OFFICE O/P EST MOD 30 MIN: CPT | Performed by: PHYSICIAN ASSISTANT

## 2024-08-02 PROCEDURE — G2211 COMPLEX E/M VISIT ADD ON: HCPCS | Performed by: PHYSICIAN ASSISTANT

## 2024-08-02 NOTE — PROGRESS NOTES
Name: Thompson Victor  YOB: 1945  Gender: male  MRN: [id number]    CC: Back Pain (MRI RESULTS)       HPI: This is a 78 y.o. year old male who presents with greater than 1 year history low back pain into bilateral buttocks.  The pain is bilateral.  He describes the pain as a dull ache.  It is exacerbated after about 5 minutes of walking, or standing up straight.  If he extends the spine, pain is worse..  Sitting can alleviate the symptoms. There is no numbness or tingling noted. He recalls the symptoms have been present for more than a year.  He did see Kei Branch to check his hips without significant degenerative changes.  It was felt that the symptoms were coming from the lumbar spine.    This patient  has not had lumbar surgery in the past.     Prior treatment: Chiropractor 2 days/week for 1 year, Tylenol, Meloxicam  He recently discontinued meloxicam because he was told he had stage III kidney disease.  We ordered MRI scan of the lumbar spine to evaluate potential stenosis as his x-rays did show spondylolisthesis.  He will begin physical therapy this week.           7/11/2024     8:55 AM   AMB PAIN ASSESSMENT   Location of Pain Back    Location Modifiers Left;Right   Severity of Pain 5   Quality of Pain Dull   Duration of Pain Persistent   Frequency of Pain Intermittent   Date Pain First Started 6/4/2023   Aggravating Factors Walking   Limiting Behavior Some   Relieving Factors Other (Comment)    Result of Injury No   Work-Related Injury No   Are there other pain locations you wish to document? No       Significant value              ROS/Meds/PSH/PMH/FH/SH: I personally reviewed the patient's collected intake data.  Below are the pertinents:    No Known Allergies        Current Outpatient Medications:     valsartan-hydroCHLOROthiazide (DIOVAN-HCT) 320-25 MG per tablet, TAKE 1 TABLET BY MOUTH DAILY, Disp: 90 tablet, Rfl: 3    cephALEXin (KEFLEX) 500 MG capsule, TAKE 1 CAPSULE BY MOUTH THREE TIMES A

## 2024-08-02 NOTE — PATIENT INSTRUCTIONS
in their ability to walk is the most bothersome part of the condition.  And, some have found that leaning forward (such as using a cart while shopping) has enabled them to go further with less pain.      Are there other conditions that can cause similar symptoms?  The condition which most closely mimics spinal stenosis is poor blood circulation to the legs (vascular claudication).   Other conditions such as diabetic neuropathy and hip or knee arthritis also have very similar symptoms to spinal stenosis.     What is the prognosis?  The natural history of degenerative spinal stenosis is usually a slow progression, gradually reducing the ability to stand or walk for extended periods.      What treatments are available?  The use of anti-inflammatory medications may give partial relief of symptoms.  Physical therapy is often prescribed initially, which may improve lumbar range of motion and strength.  Chiropractic care may help ease early symptoms in some patients.  A series of steroid injections into the spine may calm the inflammation of the nerves and give temporary relief of the buttock and leg symptoms.  Though, these treatments may help the patient cope with the symptoms for several years, they have little effect on the natural history of the disease which is slow progression.    When should I consider consulting a spine surgeon?  When you are no longer able to tolerate the symptoms or it is interfering with your everyday activities despite the use of conservative treatments, you may be a good candidate for a decompression type of spine surgery. The procedure typically performed is called a laminectomy.  Some patients may require a fusion in addition to the laminectomy if there is too much joint laxity from the arthritic changes in the spine.   A decompression operation for spinal stenosis is about 80% effective in reducing your buttock and leg symptoms, including your ability to stand and walk.  Though there may

## 2024-08-06 DIAGNOSIS — E78.00 PURE HYPERCHOLESTEROLEMIA: ICD-10-CM

## 2024-08-06 RX ORDER — ATORVASTATIN CALCIUM 20 MG/1
20 TABLET, FILM COATED ORAL DAILY
Qty: 90 TABLET | Refills: 1 | Status: SHIPPED | OUTPATIENT
Start: 2024-08-06

## 2024-08-12 ENCOUNTER — OFFICE VISIT (OUTPATIENT)
Dept: ORTHOPEDIC SURGERY | Age: 79
End: 2024-08-12
Payer: MEDICARE

## 2024-08-12 DIAGNOSIS — M54.17 LUMBOSACRAL RADICULOPATHY: Primary | ICD-10-CM

## 2024-08-12 PROCEDURE — 62323 NJX INTERLAMINAR LMBR/SAC: CPT | Performed by: PHYSICAL MEDICINE & REHABILITATION

## 2024-08-12 RX ORDER — TRIAMCINOLONE ACETONIDE 40 MG/ML
100 INJECTION, SUSPENSION INTRA-ARTICULAR; INTRAMUSCULAR ONCE
Status: COMPLETED | OUTPATIENT
Start: 2024-08-12 | End: 2024-08-12

## 2024-08-12 RX ADMIN — TRIAMCINOLONE ACETONIDE 100 MG: 40 INJECTION, SUSPENSION INTRA-ARTICULAR; INTRAMUSCULAR at 13:23

## 2024-08-12 NOTE — PROGRESS NOTES
Date: 08/12/24   Name: Thompson Victor    Pre-Procedural Diagnosis:    Diagnosis Orders   1. Lumbosacral radiculopathy  XR INJ SPINE THER SUBST LUM/SAC W IMG    triamcinolone acetonide (KENALOG-40) injection 100 mg          Procedure: Lumbar Epidural Steroid Injection (ENMANUEL)    I have reviewed prior lumbar spine radiographs that reveal 5 non rib-bearing lumbar vertebrae., I have reviewed clinician's notes and orders placed., and I have personally reviewed with patient the informed consent for operation/procedure per [facility] protocol.  This involves risks and benefits of procedure, potential for success/improvement of injections,qualifications of physician performing procedure.  Consent form addressed appropriate local anesthesia, emergent blood transfusion, clarification of DNR status.  This form was signed by all appropriate parties and scanned into the medical record. Note that is not appropriate for me to discuss spine surgical issues or other treatment options if I am not the primary clinician.  If I am the ordering clinician, those issues would have been discussed at the appropriate office visit or at upcoming encounter.     Precautions: [facility] Precautions spine injections: None.  Patient denies any prior sensitivity to steroid, local anesthetic, contrast dye, iodine or shellfish.    The procedure was discussed at length with the patient and informed consent was signed. The patient was placed in a prone position on the fluoroscopy table and the skin was prepped and draped in a routine sterile fashion. The area to be injected was anesthetized with approximately 5 cc of 1% Lidocaine. Initially a 22-gauge 3.5 inch spinal needle was carefully advanced under fluoroscopic guidance to the left L4-L5 interlaminar epidural space. At this time 0.25 cc of omnipaque administered.. Once proper placement was confirmed, 1.5 cc of sterile water and 100 mg of Kenalog were injected through the spinal needle.     Fluoroscopic

## 2024-08-14 ENCOUNTER — HOSPITAL ENCOUNTER (OUTPATIENT)
Dept: PHYSICAL THERAPY | Age: 79
Setting detail: RECURRING SERIES
Discharge: HOME | End: 2024-08-17
Payer: MEDICARE

## 2024-08-14 DIAGNOSIS — M54.59 OTHER LOW BACK PAIN: Primary | ICD-10-CM

## 2024-08-14 DIAGNOSIS — R29.3 ABNORMAL POSTURE: ICD-10-CM

## 2024-08-14 DIAGNOSIS — M51.36 DDD (DEGENERATIVE DISC DISEASE), LUMBAR: ICD-10-CM

## 2024-08-14 PROCEDURE — 97161 PT EVAL LOW COMPLEX 20 MIN: CPT

## 2024-08-14 PROCEDURE — 97140 MANUAL THERAPY 1/> REGIONS: CPT

## 2024-08-14 PROCEDURE — 97110 THERAPEUTIC EXERCISES: CPT

## 2024-08-14 ASSESSMENT — PAIN SCALES - GENERAL: PAINLEVEL_OUTOF10: 0

## 2024-08-14 NOTE — THERAPY EVALUATION
and decrease pain to improve functional mobility during ADLs.     Discharge Goals: Time Frame: 6-8 weeks  Thompson Victor  will demonstrate an 10 point improvement on the Oswestry to show improvement in function.  Thompson Victor  will demonstrate >=4+/5 LE strength on manual muscle testing to improve functional mobility.  Thompson Victor  will be able to perform SLS >10 seconds bilaterally to help with gait and improve balance.  Thompson Victor  will be able to demonstrate safe lifting and transfer mechanics without cueing for improved safety with home, childcare, and community activities.  Thompson Victor will participate in [employer] service and report <2/10 pain at end of service to demonstrate tolerable participation in community events  Thompson Victor will play 9 holes of gold without complaints to demonstrate tolerance to moderate-high intensity exercise activities.            Medical Necessity:   Patient is expected to demonstrate progress in strength, range of motion, balance, coordination and functional technique to return to prior level of function.  Skilled intervention continues to be required due to decreased ROM, muscle weakness, decreased functional mobility, decreased posture, and increased pain.    Reason for Services/other comments:  Patient continues to require skilled intervention due to above deficits affecting participation in basic ADLs and overall functional tolerance.       Regarding Thompson Victor's therapy, I certify that the treatment plan above will be carried out by a therapist or under their direction.  Thank you for this referral,  Sean Vela PT     Referring Physician Signature: Ying Moreno PA-C _______________________________ Date _____________        Charge Capture  Events  Appt Desk  Attendance Report

## 2024-08-14 NOTE — PROGRESS NOTES
Thompson RIDER Valente  : 1945  Primary: [facility] Medicare Advantage (Medicare Managed)  Secondary:  [facility]  [address]  [address]  [facility] [address]  Phone: [phone]  Fax: [phone] Plan Frequency: 1-2 sessions per week for 90 days    Plan of Care/Certification Expiration Date: 24        Plan of Care/Certification Expiration Date:  Plan of Care/Certification Expiration Date: 24    Frequency/Duration: Plan Frequency: 1-2 sessions per week for 90 days      Time In/Out:   Time In: 0859  Time Out: 1000      PT Visit Info:         Visit Count:  1    OUTPATIENT PHYSICAL THERAPY:   Treatment Note 2024       Episode  (Low Back Pain)               Treatment Diagnosis:    Other low back pain  Abnormal posture  DDD (degenerative disc disease), lumbar  Medical/Referring Diagnosis:    DDD (degenerative disc disease), lumbar [M51.36]  Spondylolisthesis of lumbar region [M43.16]  Facet arthropathy, lumbar [M47.816]      Referring Physician:  Ying Moreno PA-C MD Orders:  PT Eval and Treat   Return MD Appt:  TBD   Date of Onset:  Onset Date: 20 (Chronic Pain)     Allergies:   Patient has no known allergies.  Restrictions/Precautions:   None      Interventions Planned (Treatment may consist of any combination of the following):     See Assessment Note    Subjective Comments:   Patient reports chronic low back pain  Initial Pain Level::     0/10  Post Session Pain Level:       0/10  Medications Last Reviewed:  2024  Updated Objective Findings:  See Evaluation Note from today  Treatment   THERAPEUTIC EXERCISE: (30 minutes):    Exercises per grid below to improve mobility, strength, and balance.  Required moderate visual, verbal, manual, and tactile cues to promote proper body alignment, promote proper body posture, promote proper body mechanics, and promote proper body breathing techniques.  Progressed resistance, range, repetitions, and

## 2024-09-03 ENCOUNTER — LAB (OUTPATIENT)
Dept: FAMILY MEDICINE CLINIC | Facility: CLINIC | Age: 79
End: 2024-09-03

## 2024-09-03 DIAGNOSIS — E11.9 TYPE 2 DIABETES MELLITUS WITHOUT COMPLICATION, WITHOUT LONG-TERM CURRENT USE OF INSULIN (HCC): ICD-10-CM

## 2024-09-03 DIAGNOSIS — Z12.5 PROSTATE CANCER SCREENING: ICD-10-CM

## 2024-09-03 DIAGNOSIS — I10 PRIMARY HYPERTENSION: ICD-10-CM

## 2024-09-03 DIAGNOSIS — N18.31 STAGE 3A CHRONIC KIDNEY DISEASE (HCC): ICD-10-CM

## 2024-09-03 DIAGNOSIS — E78.00 PURE HYPERCHOLESTEROLEMIA: ICD-10-CM

## 2024-09-03 LAB
ALBUMIN SERPL-MCNC: 3.7 G/DL (ref 3.2–4.6)
ALBUMIN/GLOB SERPL: 1.2 (ref 1–1.9)
ALP SERPL-CCNC: 84 U/L (ref 40–129)
ALT SERPL-CCNC: 32 U/L (ref 12–65)
ANION GAP SERPL CALC-SCNC: 11 MMOL/L (ref 9–18)
AST SERPL-CCNC: 22 U/L (ref 15–37)
BASOPHILS # BLD: 0.1 K/UL (ref 0–0.2)
BASOPHILS NFR BLD: 1 % (ref 0–2)
BILIRUB SERPL-MCNC: 0.7 MG/DL (ref 0–1.2)
BUN SERPL-MCNC: 24 MG/DL (ref 8–23)
CALCIUM SERPL-MCNC: 9.4 MG/DL (ref 8.8–10.2)
CHLORIDE SERPL-SCNC: 102 MMOL/L (ref 98–107)
CHOLEST SERPL-MCNC: 170 MG/DL (ref 0–200)
CO2 SERPL-SCNC: 26 MMOL/L (ref 20–28)
CREAT SERPL-MCNC: 1.35 MG/DL (ref 0.8–1.3)
DIFFERENTIAL METHOD BLD: NORMAL
EOSINOPHIL # BLD: 0.5 K/UL (ref 0–0.8)
EOSINOPHIL NFR BLD: 5 % (ref 0.5–7.8)
ERYTHROCYTE [DISTWIDTH] IN BLOOD BY AUTOMATED COUNT: 13.4 % (ref 11.9–14.6)
EST. AVERAGE GLUCOSE BLD GHB EST-MCNC: 150 MG/DL
GLOBULIN SER CALC-MCNC: 3.1 G/DL (ref 2.3–3.5)
GLUCOSE SERPL-MCNC: 112 MG/DL (ref 70–99)
HBA1C MFR BLD: 6.9 % (ref 0–5.6)
HCT VFR BLD AUTO: 44.3 % (ref 41.1–50.3)
HDLC SERPL-MCNC: 46 MG/DL (ref 40–60)
HDLC SERPL: 3.7 (ref 0–5)
HGB BLD-MCNC: 14.3 G/DL (ref 13.6–17.2)
IMM GRANULOCYTES # BLD AUTO: 0 K/UL (ref 0–0.5)
IMM GRANULOCYTES NFR BLD AUTO: 0 % (ref 0–5)
LDLC SERPL CALC-MCNC: 99 MG/DL (ref 0–100)
LYMPHOCYTES # BLD: 2.1 K/UL (ref 0.5–4.6)
LYMPHOCYTES NFR BLD: 22 % (ref 13–44)
MCH RBC QN AUTO: 29.2 PG (ref 26.1–32.9)
MCHC RBC AUTO-ENTMCNC: 32.3 G/DL (ref 31.4–35)
MCV RBC AUTO: 90.6 FL (ref 82–102)
MONOCYTES # BLD: 0.8 K/UL (ref 0.1–1.3)
MONOCYTES NFR BLD: 9 % (ref 4–12)
NEUTS SEG # BLD: 6.1 K/UL (ref 1.7–8.2)
NEUTS SEG NFR BLD: 63 % (ref 43–78)
NRBC # BLD: 0 K/UL (ref 0–0.2)
PLATELET # BLD AUTO: 242 K/UL (ref 150–450)
PMV BLD AUTO: 10.3 FL (ref 9.4–12.3)
POTASSIUM SERPL-SCNC: 4.3 MMOL/L (ref 3.5–5.1)
PROT SERPL-MCNC: 6.8 G/DL (ref 6.3–8.2)
PSA SERPL-MCNC: 1.5 NG/ML (ref 0–4)
RBC # BLD AUTO: 4.89 M/UL (ref 4.23–5.6)
SODIUM SERPL-SCNC: 138 MMOL/L (ref 136–145)
TRIGL SERPL-MCNC: 121 MG/DL (ref 0–150)
VLDLC SERPL CALC-MCNC: 24 MG/DL (ref 6–23)
WBC # BLD AUTO: 9.6 K/UL (ref 4.3–11.1)

## 2024-09-08 SDOH — HEALTH STABILITY: PHYSICAL HEALTH: ON AVERAGE, HOW MANY DAYS PER WEEK DO YOU ENGAGE IN MODERATE TO STRENUOUS EXERCISE (LIKE A BRISK WALK)?: 3 DAYS

## 2024-09-08 SDOH — HEALTH STABILITY: PHYSICAL HEALTH: ON AVERAGE, HOW MANY MINUTES DO YOU ENGAGE IN EXERCISE AT THIS LEVEL?: 60 MIN

## 2024-09-08 ASSESSMENT — LIFESTYLE VARIABLES
HOW MANY STANDARD DRINKS CONTAINING ALCOHOL DO YOU HAVE ON A TYPICAL DAY: 0
HOW OFTEN DO YOU HAVE SIX OR MORE DRINKS ON ONE OCCASION: 1
HOW MANY STANDARD DRINKS CONTAINING ALCOHOL DO YOU HAVE ON A TYPICAL DAY: PATIENT DOES NOT DRINK
HOW OFTEN DO YOU HAVE A DRINK CONTAINING ALCOHOL: NEVER
HOW OFTEN DO YOU HAVE A DRINK CONTAINING ALCOHOL: 1

## 2024-09-08 ASSESSMENT — PATIENT HEALTH QUESTIONNAIRE - PHQ9
1. LITTLE INTEREST OR PLEASURE IN DOING THINGS: NOT AT ALL
SUM OF ALL RESPONSES TO PHQ QUESTIONS 1-9: 0
2. FEELING DOWN, DEPRESSED OR HOPELESS: NOT AT ALL
SUM OF ALL RESPONSES TO PHQ QUESTIONS 1-9: 0
SUM OF ALL RESPONSES TO PHQ9 QUESTIONS 1 & 2: 0

## 2024-09-09 ENCOUNTER — OFFICE VISIT (OUTPATIENT)
Dept: FAMILY MEDICINE CLINIC | Facility: CLINIC | Age: 79
End: 2024-09-09
Payer: MEDICARE

## 2024-09-09 VITALS
BODY MASS INDEX: 32.44 KG/M2 | WEIGHT: 219 LBS | HEIGHT: 69 IN | DIASTOLIC BLOOD PRESSURE: 68 MMHG | SYSTOLIC BLOOD PRESSURE: 136 MMHG | TEMPERATURE: 97.6 F | RESPIRATION RATE: 16 BRPM | OXYGEN SATURATION: 94 % | HEART RATE: 80 BPM

## 2024-09-09 DIAGNOSIS — Z00.00 MEDICARE ANNUAL WELLNESS VISIT, SUBSEQUENT: Primary | ICD-10-CM

## 2024-09-09 DIAGNOSIS — N13.8 BPH WITH OBSTRUCTION/LOWER URINARY TRACT SYMPTOMS: ICD-10-CM

## 2024-09-09 DIAGNOSIS — E78.00 PURE HYPERCHOLESTEROLEMIA: ICD-10-CM

## 2024-09-09 DIAGNOSIS — M48.061 SPINAL STENOSIS OF LUMBAR REGION WITHOUT NEUROGENIC CLAUDICATION: ICD-10-CM

## 2024-09-09 DIAGNOSIS — N40.1 BPH WITH OBSTRUCTION/LOWER URINARY TRACT SYMPTOMS: ICD-10-CM

## 2024-09-09 DIAGNOSIS — N18.31 STAGE 3A CHRONIC KIDNEY DISEASE (HCC): ICD-10-CM

## 2024-09-09 DIAGNOSIS — E66.09 CLASS 1 OBESITY DUE TO EXCESS CALORIES WITH SERIOUS COMORBIDITY AND BODY MASS INDEX (BMI) OF 32.0 TO 32.9 IN ADULT: ICD-10-CM

## 2024-09-09 DIAGNOSIS — Z85.828 HISTORY OF SKIN CANCER: ICD-10-CM

## 2024-09-09 PROCEDURE — 99214 OFFICE O/P EST MOD 30 MIN: CPT | Performed by: FAMILY MEDICINE

## 2024-09-09 PROCEDURE — 1123F ACP DISCUSS/DSCN MKR DOCD: CPT | Performed by: FAMILY MEDICINE

## 2024-09-09 PROCEDURE — 3075F SYST BP GE 130 - 139MM HG: CPT | Performed by: FAMILY MEDICINE

## 2024-09-09 PROCEDURE — 3078F DIAST BP <80 MM HG: CPT | Performed by: FAMILY MEDICINE

## 2024-09-09 PROCEDURE — G0439 PPPS, SUBSEQ VISIT: HCPCS | Performed by: FAMILY MEDICINE

## 2024-09-09 SDOH — ECONOMIC STABILITY: FOOD INSECURITY: WITHIN THE PAST 12 MONTHS, YOU WORRIED THAT YOUR FOOD WOULD RUN OUT BEFORE YOU GOT MONEY TO BUY MORE.: NEVER TRUE

## 2024-09-09 SDOH — ECONOMIC STABILITY: FOOD INSECURITY: WITHIN THE PAST 12 MONTHS, THE FOOD YOU BOUGHT JUST DIDN'T LAST AND YOU DIDN'T HAVE MONEY TO GET MORE.: NEVER TRUE

## 2024-09-09 SDOH — ECONOMIC STABILITY: INCOME INSECURITY: HOW HARD IS IT FOR YOU TO PAY FOR THE VERY BASICS LIKE FOOD, HOUSING, MEDICAL CARE, AND HEATING?: NOT VERY HARD

## 2024-09-25 ENCOUNTER — OFFICE VISIT (OUTPATIENT)
Age: 79
End: 2024-09-25
Payer: MEDICARE

## 2024-09-25 DIAGNOSIS — M47.816 FACET ARTHROPATHY, LUMBAR: ICD-10-CM

## 2024-09-25 DIAGNOSIS — M43.16 SPONDYLOLISTHESIS OF LUMBAR REGION: ICD-10-CM

## 2024-09-25 DIAGNOSIS — M48.062 LUMBAR STENOSIS WITH NEUROGENIC CLAUDICATION: ICD-10-CM

## 2024-09-25 DIAGNOSIS — M51.36 DDD (DEGENERATIVE DISC DISEASE), LUMBAR: ICD-10-CM

## 2024-09-25 DIAGNOSIS — M54.17 LUMBOSACRAL RADICULOPATHY: Primary | ICD-10-CM

## 2024-09-25 PROCEDURE — 1123F ACP DISCUSS/DSCN MKR DOCD: CPT | Performed by: PHYSICIAN ASSISTANT

## 2024-09-25 PROCEDURE — 99214 OFFICE O/P EST MOD 30 MIN: CPT | Performed by: PHYSICIAN ASSISTANT

## 2024-10-07 ENCOUNTER — OFFICE VISIT (OUTPATIENT)
Dept: ORTHOPEDIC SURGERY | Age: 79
End: 2024-10-07
Payer: MEDICARE

## 2024-10-07 DIAGNOSIS — M47.816 LUMBAR SPONDYLOSIS: Primary | ICD-10-CM

## 2024-10-07 PROCEDURE — 64493 INJ PARAVERT F JNT L/S 1 LEV: CPT | Performed by: PHYSICAL MEDICINE & REHABILITATION

## 2024-10-07 PROCEDURE — 64494 INJ PARAVERT F JNT L/S 2 LEV: CPT | Performed by: PHYSICAL MEDICINE & REHABILITATION

## 2024-10-07 RX ORDER — TRIAMCINOLONE ACETONIDE 40 MG/ML
200 INJECTION, SUSPENSION INTRA-ARTICULAR; INTRAMUSCULAR ONCE
Status: COMPLETED | OUTPATIENT
Start: 2024-10-07 | End: 2024-10-07

## 2024-10-07 RX ADMIN — TRIAMCINOLONE ACETONIDE 200 MG: 40 INJECTION, SUSPENSION INTRA-ARTICULAR; INTRAMUSCULAR at 13:27

## 2024-10-07 NOTE — PROGRESS NOTES
Date: 10/07/24   Name: Thompson Victor    Pre-Procedural Diagnosis:    Diagnosis Orders   1. Lumbar spondylosis  FL INJ LUMB/SAC FACET SINGLE LEVEL    XR INJ FACET LUMB SACRAL 2ND LVL    triamcinolone acetonide (KENALOG-40) injection 200 mg          Procedure: Bilateral Facet Joint Injections (2 levels)    I have reviewed prior lumbar spine radiographs that reveal 5 non rib-bearing lumbar vertebrae., I have reviewed clinician's notes and orders placed., and I have personally reviewed with patient the informed consent for operation/procedure per [facility] protocol.  This involves risks and benefits of procedure, potential for success/improvement of injections,qualifications of physician performing procedure.  Consent form addressed appropriate local anesthesia, emergent blood transfusion, clarification of DNR status.  This form was signed by all appropriate parties and scanned into the medical record. Note that is not appropriate for me to discuss spine surgical issues or other treatment options if I am not the primary clinician.  If I am the ordering clinician, those issues would have been discussed at the appropriate office visit or at upcoming encounter.     Precautions:  [facility] Precautions spine injections: None.  Patient denies any prior sensitivity to steroid, local anesthetic, contrast dye, iodine or shellfish.    The procedure was discussed at length with the patient and informed consent was signed. The patient was placed in a prone position on the fluoroscopy table and the skin was prepped and draped in a routine sterile fashion. The areas to be injected were each anesthetized with approximately 2-3 cc of 1% Lidocaine. Initially a 22-gauge 3.5 inch spinal needle was carefully advanced under fluoroscopic guidance to the bilateral L3-L4 and L4-L5 facet joint spaces. 1 cc of 0.25% Marcaine and 50 mg of Kenalog were injected through the spinal needle at each site.     Fluoroscopic guidance was used

## 2024-11-04 ENCOUNTER — TELEPHONE (OUTPATIENT)
Dept: UROLOGY | Age: 79
End: 2024-11-04

## 2024-11-04 ENCOUNTER — OFFICE VISIT (OUTPATIENT)
Dept: UROLOGY | Age: 79
End: 2024-11-04
Payer: MEDICARE

## 2024-11-04 DIAGNOSIS — R35.0 URINE FREQUENCY: Primary | ICD-10-CM

## 2024-11-04 DIAGNOSIS — N13.8 BPH WITH OBSTRUCTION/LOWER URINARY TRACT SYMPTOMS: ICD-10-CM

## 2024-11-04 DIAGNOSIS — N40.1 BPH WITH OBSTRUCTION/LOWER URINARY TRACT SYMPTOMS: ICD-10-CM

## 2024-11-04 LAB
BILIRUBIN, URINE, POC: NEGATIVE
BLOOD URINE, POC: NEGATIVE
GLUCOSE URINE, POC: NEGATIVE MG/DL
KETONES, URINE, POC: NEGATIVE MG/DL
LEUKOCYTE ESTERASE, URINE, POC: NEGATIVE
NITRITE, URINE, POC: NEGATIVE
PH, URINE, POC: 5.5 (ref 4.6–8)
PROTEIN,URINE, POC: NEGATIVE MG/DL
SPECIFIC GRAVITY, URINE, POC: 1.02 (ref 1–1.03)
URINALYSIS CLARITY, POC: NORMAL
URINALYSIS COLOR, POC: NORMAL
UROBILINOGEN, POC: NORMAL MG/DL

## 2024-11-04 PROCEDURE — 99204 OFFICE O/P NEW MOD 45 MIN: CPT | Performed by: UROLOGY

## 2024-11-04 PROCEDURE — 81003 URINALYSIS AUTO W/O SCOPE: CPT | Performed by: UROLOGY

## 2024-11-04 PROCEDURE — 1123F ACP DISCUSS/DSCN MKR DOCD: CPT | Performed by: UROLOGY

## 2024-11-04 PROCEDURE — 1159F MED LIST DOCD IN RCRD: CPT | Performed by: UROLOGY

## 2024-11-04 ASSESSMENT — ENCOUNTER SYMPTOMS
BACK PAIN: 0
NAUSEA: 0

## 2024-11-04 NOTE — PROGRESS NOTES
[facility]  [address] [place] [address]  [phone]    Thompson Victor  : 1945    Chief Complaint   Patient presents with    New Patient        HPI     Thompson Victor is a 79 y.o. male   Pt was last seen in , excerpt from that note: Referred by Dr.Julie Baxtre for evaluation and treatment of BPH/LUTS. complaining of a several year history of urinary frequency and would like to be evaluated for the UroLift procedure.  He is not having any burning with urination.  He is otherwise healthy.  He gets up about 2-3 times a night to void and he also pees about every 2 hours.  has been having nocturia for about one year.   Nocturia is better when he takes his diuretic in the AM, instead of at night. Nocturia x 1 when he does that.   Has slow stream , post void dribbling, incomplete emptying.   Hx of HTN and type II diabetes.   His father had prostate cancer; had prostatectomy then had metastatic prostate cancer.   His brother had a Urolift and has done well.     Was started on tamsulosin in . PSA 0.9 in . Referred back by Dr. Iris Baxter because of urinary frequency. PSA 1.5 in . He c/o nocturia x 3-5. Stream is weak. No incontinence. Tamsulosin didn't help; he is no longer on it .    Past Medical History:   Diagnosis Date    Arthritis     Chronic renal disease, stage III (HCC) [782689] 2024    Hyperlipidemia     Hypertension     medication    Type II or unspecified type diabetes mellitus without mention of complication, not stated as uncontrolled 2015    diet controlled and prediabetes per patient; avg fasting  - 120     Past Surgical History:   Procedure Laterality Date    COLONOSCOPY      HEENT  2014    skin cancer removed    HEENT      Lasik    LIMBAL STEM CELL TRANSPLANT Left     left knee - stem cell injection left knee    ORTHOPEDIC SURGERY      left foot plantar fasciitis    ORTHOPEDIC SURGERY  2013    right knee partial knee

## 2024-11-05 ENCOUNTER — OFFICE VISIT (OUTPATIENT)
Age: 79
End: 2024-11-05
Payer: MEDICARE

## 2024-11-05 DIAGNOSIS — M48.062 LUMBAR STENOSIS WITH NEUROGENIC CLAUDICATION: ICD-10-CM

## 2024-11-05 DIAGNOSIS — M47.816 FACET ARTHROPATHY, LUMBAR: ICD-10-CM

## 2024-11-05 DIAGNOSIS — M43.16 SPONDYLOLISTHESIS OF LUMBAR REGION: Primary | ICD-10-CM

## 2024-11-05 PROCEDURE — 99213 OFFICE O/P EST LOW 20 MIN: CPT | Performed by: PHYSICIAN ASSISTANT

## 2024-11-05 PROCEDURE — 1123F ACP DISCUSS/DSCN MKR DOCD: CPT | Performed by: PHYSICIAN ASSISTANT

## 2024-11-05 NOTE — PROGRESS NOTES
Name: Thompson Victor  YOB: 1945  Gender: male  MRN: [id number]    CC: Back Pain (Follow up after injection with [facility] 10/7/24)       HPI: This is a 79 y.o. year old male who presents with greater than 1 year history low back pain into bilateral buttocks.  The pain is bilateral.  He describes the pain as a dull ache.  It is exacerbated after about 5 minutes of walking, or standing up straight.  If he extends the spine, pain is worse..  Sitting can alleviate the symptoms. There is no numbness or tingling noted. He recalls the symptoms have been present for more than a year.  He did see Kei Branch to check his hips without significant degenerative changes.  It was felt that the symptoms were coming from the lumbar spine.    This patient  has not had lumbar surgery in the past.     Prior treatment: Chiropractor 2 days/week for 1 year, Tylenol, Meloxicam  He recently discontinued meloxicam because he was told he had stage III kidney disease.  We ordered MRI scan of the lumbar spine to evaluate potential stenosis as his x-rays did show spondylolisthesis.  He will begin physical therapy this week.    X-rays of the lumbar spine revealed subtle anterior listhesis L3-4 bulky facet arthropathy.  The MRI scan revealed facet arthropathy L3-4 L4-5 subtle anterolisthesis L3-4.  Lateral recess stenosis mild central stenosis at L4-5.  We referred him for L4-5 epidural steroid injection and he reports over 50% relief of the lower back pain and pain into the buttocks for at least 4 weeks.  He was able to walk further.      He had more residual back pain in September and we felt this was likely from the large bulky facet he does have the spondylolisthesis at L3-4 and hypertrophied facets therefore we referred him for bilateral L3-4 and L4-5 facet injections.  He reports 75% relief of his back pain and that these injections were more effective than the first injection.  He has no pain today.  Still having adequate relief.

## 2024-11-21 ENCOUNTER — TELEPHONE (OUTPATIENT)
Dept: FAMILY MEDICINE CLINIC | Facility: CLINIC | Age: 79
End: 2024-11-21

## 2024-11-21 NOTE — TELEPHONE ENCOUNTER
Patient is having side affects and suspects it is from the valsartan: dizziness, lightheadedness, fatigue, and feels like he might faint.   
Ttc pt lvm for pt to r/c  
Oriented - self; Oriented - place; Oriented - time

## 2024-12-06 ENCOUNTER — PROCEDURE VISIT (OUTPATIENT)
Dept: UROLOGY | Age: 79
End: 2024-12-06

## 2024-12-06 DIAGNOSIS — N40.1 BPH WITH OBSTRUCTION/LOWER URINARY TRACT SYMPTOMS: Primary | ICD-10-CM

## 2024-12-06 DIAGNOSIS — N13.8 BPH WITH OBSTRUCTION/LOWER URINARY TRACT SYMPTOMS: Primary | ICD-10-CM

## 2024-12-06 LAB
BILIRUBIN, URINE, POC: NEGATIVE
BLOOD URINE, POC: NEGATIVE
GLUCOSE URINE, POC: NEGATIVE MG/DL
KETONES, URINE, POC: NORMAL MG/DL
LEUKOCYTE ESTERASE, URINE, POC: NEGATIVE
NITRITE, URINE, POC: NEGATIVE
PH, URINE, POC: 5.5 (ref 4.6–8)
PROTEIN,URINE, POC: NEGATIVE MG/DL
PVR, POC: 47 CC
SPECIFIC GRAVITY, URINE, POC: 1.02 (ref 1–1.03)
URINALYSIS CLARITY, POC: NORMAL
URINALYSIS COLOR, POC: NORMAL
UROBILINOGEN, POC: NORMAL MG/DL

## 2024-12-06 NOTE — PROGRESS NOTES
Micro  WBC - 0  RBC - 0  Bacteria - 0  Epith - 0          Cystoscopy Procedure:     Procedure Room # 3  Assistant: ca  Cystoscopy Procedure Note    Procedure Details   The risks, benefits, complications, treatment options, and expected outcomes were discussed with the patient. The patient concurred with the proposed plan, giving informed consent.    Cystoscopy was performed today under local anesthesia, using sterile technique. The patient was placed in the lithotomy position, prepped with Betadine, and draped in the usual sterile fashion. A  flexiblecystoscope was used to inspect both the urethra and bladder using the 30 and 70 degree lenses.    Findings:  Anterior urethra: normal without strictures.    Prostatic urethra:  moderate prostatic hyperplasia, without bladder neck contracture. Lateral lobe hypertrophy, fossa length 3 cm .    Bladder: Mild trabeculation, without lesions.    Ureteral orifice(s) was/were seen bilateral;                             Complications:  None; patient tolerated the procedure well.                    Condition: stable  PVR 46 ml   IPSS is 22, highest score of 4 for frequency, urgency and weak stream.   Assessment and Plan    ICD-10-CM    1. BPH with obstruction/lower urinary tract symptoms  N40.1 AMB POC URINALYSIS DIP STICK AUTO W/O MICRO    N13.8 AMB POC PVR, ISABELLE,POST-VOID RES,US,NON-IMAGING     CYSTOURETHROSCOPY      Separate E and M encounter to discuss findings and treatment options.   Discussed medical and surgical options for BPH. Discussed rezum and TURP.   Gave info about Rezum.   Return for Urocuff.

## 2024-12-10 ENCOUNTER — NURSE ONLY (OUTPATIENT)
Dept: UROLOGY | Age: 79
End: 2024-12-10
Payer: MEDICARE

## 2024-12-10 DIAGNOSIS — N13.8 BPH WITH OBSTRUCTION/LOWER URINARY TRACT SYMPTOMS: Primary | ICD-10-CM

## 2024-12-10 DIAGNOSIS — N40.1 BPH WITH OBSTRUCTION/LOWER URINARY TRACT SYMPTOMS: Primary | ICD-10-CM

## 2024-12-10 LAB — PVR, POC: 0 CC

## 2024-12-10 PROCEDURE — 51726 COMPLEX CYSTOMETROGRAM: CPT | Performed by: UROLOGY

## 2024-12-10 PROCEDURE — 51784 ANAL/URINARY MUSCLE STUDY: CPT | Performed by: UROLOGY

## 2024-12-10 PROCEDURE — 51798 US URINE CAPACITY MEASURE: CPT | Performed by: UROLOGY

## 2024-12-10 NOTE — PROGRESS NOTES
Patient came for cystometrogram and anal/bladder muscle study. Urocuff procedure completed.  PVR = 0

## 2025-01-22 ENCOUNTER — TELEPHONE (OUTPATIENT)
Dept: UROLOGY | Age: 80
End: 2025-01-22

## 2025-01-27 ENCOUNTER — OFFICE VISIT (OUTPATIENT)
Dept: UROLOGY | Age: 80
End: 2025-01-27
Payer: MEDICARE

## 2025-01-27 DIAGNOSIS — N32.81 OAB (OVERACTIVE BLADDER): ICD-10-CM

## 2025-01-27 DIAGNOSIS — R35.0 URINE FREQUENCY: ICD-10-CM

## 2025-01-27 DIAGNOSIS — N40.1 BPH WITH OBSTRUCTION/LOWER URINARY TRACT SYMPTOMS: Primary | ICD-10-CM

## 2025-01-27 DIAGNOSIS — N13.8 BPH WITH OBSTRUCTION/LOWER URINARY TRACT SYMPTOMS: Primary | ICD-10-CM

## 2025-01-27 PROCEDURE — G8427 DOCREV CUR MEDS BY ELIG CLIN: HCPCS | Performed by: UROLOGY

## 2025-01-27 PROCEDURE — 1036F TOBACCO NON-USER: CPT | Performed by: UROLOGY

## 2025-01-27 PROCEDURE — 81003 URINALYSIS AUTO W/O SCOPE: CPT | Performed by: UROLOGY

## 2025-01-27 PROCEDURE — G8417 CALC BMI ABV UP PARAM F/U: HCPCS | Performed by: UROLOGY

## 2025-01-27 PROCEDURE — 1123F ACP DISCUSS/DSCN MKR DOCD: CPT | Performed by: UROLOGY

## 2025-01-27 PROCEDURE — 1159F MED LIST DOCD IN RCRD: CPT | Performed by: UROLOGY

## 2025-01-27 PROCEDURE — 99214 OFFICE O/P EST MOD 30 MIN: CPT | Performed by: UROLOGY

## 2025-01-27 RX ORDER — VIBEGRON 75 MG/1
75 TABLET, FILM COATED ORAL DAILY
Qty: 30 TABLET | Refills: 11 | Status: SHIPPED | OUTPATIENT
Start: 2025-01-27 | End: 2025-01-27

## 2025-01-27 RX ORDER — VIBEGRON 75 MG/1
75 TABLET, FILM COATED ORAL DAILY
Qty: 90 TABLET | Refills: 3 | Status: SHIPPED | OUTPATIENT
Start: 2025-01-27

## 2025-01-27 ASSESSMENT — ENCOUNTER SYMPTOMS
BACK PAIN: 0
NAUSEA: 0

## 2025-01-27 NOTE — PROGRESS NOTES
[facility]  [address] [place] [address]  [phone]    Thompson Victor  : 1945    Chief Complaint   Patient presents with    Follow-up        HPI     Thompson Victor is a 79 y.o. male with hx of BPH/LUTS, family hx of prostate cancer.   Pt was last seen in , excerpt from that note: Referred by Dr.Julie Baxter for evaluation and treatment of BPH/LUTS. complaining of a several year history of urinary frequency and would like to be evaluated for the UroLift procedure.  He is not having any burning with urination.  He is otherwise healthy.  He gets up about 2-3 times a night to void and he also pees about every 2 hours.  has been having nocturia for about one year.   Nocturia is better when he takes his diuretic in the AM, instead of at night. Nocturia x 1 when he does that.   Has slow stream , post void dribbling, incomplete emptying.   Hx of HTN and type II diabetes.   His father had prostate cancer; had prostatectomy then had metastatic prostate cancer.   His brother had a Urolift and has done well.      Was started on tamsulosin in . PSA 0.9 in . Referred back by Dr. Iris Baxter because of urinary frequency. PSA 1.5 in . He c/o nocturia x 3-5. Stream is weak. No incontinence. Tamsulosin didn't help; he is no longer on it .   MIMI: prostate not enlarged, no nodules  Mild anal stenosis  Cysto in : Prostatic urethra:  moderate prostatic hyperplasia, without bladder neck contracture. Lateral lobe hypertrophy, fossa length 3 cm .   PVR 46 ml   IPSS is 22, highest score of 4 for frequency, urgency and weak stream.  Discussed medical and surgical options for BPH. Discussed rezum and TURP.   Gave info about Rezum.   Urocuff showed low pressure, low floe. Voided volume 46 ml, PVR 0.     Past Medical History:   Diagnosis Date    Arthritis     Chronic renal disease, stage III (HCC) [[id number]] 2024    Hyperlipidemia     Hypertension     medication

## 2025-02-03 ENCOUNTER — TELEPHONE (OUTPATIENT)
Dept: UROLOGY | Age: 80
End: 2025-02-03

## 2025-02-03 RX ORDER — TAMSULOSIN HYDROCHLORIDE 0.4 MG/1
0.4 CAPSULE ORAL DAILY
Qty: 90 CAPSULE | Refills: 3 | Status: SHIPPED | OUTPATIENT
Start: 2025-02-03

## 2025-02-10 ENCOUNTER — OFFICE VISIT (OUTPATIENT)
Dept: ORTHOPEDIC SURGERY | Age: 80
End: 2025-02-10
Payer: MEDICARE

## 2025-02-10 DIAGNOSIS — M17.12 OSTEOARTHRITIS OF LEFT KNEE, UNSPECIFIED OSTEOARTHRITIS TYPE: ICD-10-CM

## 2025-02-10 DIAGNOSIS — M25.562 LEFT KNEE PAIN, UNSPECIFIED CHRONICITY: Primary | ICD-10-CM

## 2025-02-10 PROCEDURE — 1123F ACP DISCUSS/DSCN MKR DOCD: CPT | Performed by: PHYSICIAN ASSISTANT

## 2025-02-10 PROCEDURE — G8417 CALC BMI ABV UP PARAM F/U: HCPCS | Performed by: PHYSICIAN ASSISTANT

## 2025-02-10 PROCEDURE — G8428 CUR MEDS NOT DOCUMENT: HCPCS | Performed by: PHYSICIAN ASSISTANT

## 2025-02-10 PROCEDURE — 1036F TOBACCO NON-USER: CPT | Performed by: PHYSICIAN ASSISTANT

## 2025-02-10 PROCEDURE — 20611 DRAIN/INJ JOINT/BURSA W/US: CPT | Performed by: PHYSICIAN ASSISTANT

## 2025-02-10 PROCEDURE — 99214 OFFICE O/P EST MOD 30 MIN: CPT | Performed by: PHYSICIAN ASSISTANT

## 2025-02-10 RX ORDER — TRIAMCINOLONE ACETONIDE 40 MG/ML
40 INJECTION, SUSPENSION INTRA-ARTICULAR; INTRAMUSCULAR ONCE
Status: COMPLETED | OUTPATIENT
Start: 2025-02-10 | End: 2025-02-10

## 2025-02-10 RX ADMIN — TRIAMCINOLONE ACETONIDE 40 MG: 40 INJECTION, SUSPENSION INTRA-ARTICULAR; INTRAMUSCULAR at 14:23

## 2025-02-10 NOTE — PROGRESS NOTES
(9/8/2024)    Exercise Vital Sign     Days of Exercise per Week: 3 days     Minutes of Exercise per Session: 60 min   Stress: Not on file   Social Connections: Unknown (3/20/2021)    Received from [employer]    Social Connections     Frequency of Communication with Friends and Family: Not asked     Frequency of Social Gatherings with Friends and Family: Not asked   Intimate Partner Violence: Unknown (3/20/2021)    Received from [employer]    Intimate Partner Violence     Fear of Current or Ex-Partner: Not asked     Emotionally Abused: Not asked     Physically Abused: Not asked     Sexually Abused: Not asked   Housing Stability: Unknown (9/9/2024)    Housing Stability Vital Sign     Unable to Pay for Housing in the Last Year: Not on file     Number of Times Moved in the Last Year: Not on file     Homeless in the Last Year: No       Review of Systems:  As per HPI.  Pertinent positives and negatives are addressed with the patient, particularly those related to musculoskeletal concerns.   Non-orthopaedic concerns were referred back to the primary care physician.    PHYSICAL EXAMINATION:   The patient appears their stated age and they are in no distress.  The lower extremities are as described below.  Circulation is normal with palpable pedal pulses bilaterally and no edema.  There is no lymph adenopathy in the popliteal or malleolar region.  The skin is without stasis disease distally bilaterally.  Hip ROM is smooth and painless.  Knee range of motion is 0-120 degrees on the right and 0-120 degrees on the left.  left knee: There is 2mm of anterior/posterior translation and 2mm of medial/lateral instability bilaterally.  There is 2 degrees of varus alignment in the left knee.  There is some pain to palpation over the medial joint line.  Limb lengths are equal.  The gait is noted to be with a slight trendelenburg and antalgia.  Straight leg test is negative.  Quadriceps strength is

## 2025-02-25 DIAGNOSIS — E78.00 PURE HYPERCHOLESTEROLEMIA: Primary | ICD-10-CM

## 2025-02-25 DIAGNOSIS — I10 PRIMARY HYPERTENSION: ICD-10-CM

## 2025-02-25 DIAGNOSIS — E11.9 TYPE 2 DIABETES MELLITUS WITHOUT COMPLICATION, WITHOUT LONG-TERM CURRENT USE OF INSULIN (HCC): ICD-10-CM

## 2025-03-04 DIAGNOSIS — E78.00 PURE HYPERCHOLESTEROLEMIA: ICD-10-CM

## 2025-03-04 DIAGNOSIS — E11.9 TYPE 2 DIABETES MELLITUS WITHOUT COMPLICATION, WITHOUT LONG-TERM CURRENT USE OF INSULIN (HCC): ICD-10-CM

## 2025-03-04 DIAGNOSIS — I10 PRIMARY HYPERTENSION: ICD-10-CM

## 2025-03-04 LAB
ANION GAP SERPL CALC-SCNC: 10 MMOL/L (ref 7–16)
BASOPHILS # BLD: 0.04 K/UL (ref 0–0.2)
BASOPHILS NFR BLD: 0.5 % (ref 0–2)
BUN SERPL-MCNC: 22 MG/DL (ref 8–23)
CALCIUM SERPL-MCNC: 9.6 MG/DL (ref 8.8–10.2)
CHLORIDE SERPL-SCNC: 104 MMOL/L (ref 98–107)
CHOLEST SERPL-MCNC: 157 MG/DL (ref 0–200)
CO2 SERPL-SCNC: 28 MMOL/L (ref 20–29)
CREAT SERPL-MCNC: 1.31 MG/DL (ref 0.8–1.3)
CREAT UR-MCNC: 195 MG/DL (ref 39–259)
DIFFERENTIAL METHOD BLD: NORMAL
EOSINOPHIL # BLD: 0.24 K/UL (ref 0–0.8)
EOSINOPHIL NFR BLD: 2.9 % (ref 0.5–7.8)
ERYTHROCYTE [DISTWIDTH] IN BLOOD BY AUTOMATED COUNT: 13.6 % (ref 11.9–14.6)
EST. AVERAGE GLUCOSE BLD GHB EST-MCNC: 136 MG/DL
GLUCOSE SERPL-MCNC: 104 MG/DL (ref 70–99)
HBA1C MFR BLD: 6.4 % (ref 0–5.6)
HCT VFR BLD AUTO: 43.9 % (ref 41.1–50.3)
HDLC SERPL-MCNC: 43 MG/DL (ref 40–60)
HDLC SERPL: 3.7 (ref 0–5)
HGB BLD-MCNC: 14.3 G/DL (ref 13.6–17.2)
IMM GRANULOCYTES # BLD AUTO: 0.02 K/UL (ref 0–0.5)
IMM GRANULOCYTES NFR BLD AUTO: 0.2 % (ref 0–5)
LDLC SERPL CALC-MCNC: 93 MG/DL (ref 0–100)
LYMPHOCYTES # BLD: 1.8 K/UL (ref 0.5–4.6)
LYMPHOCYTES NFR BLD: 21.4 % (ref 13–44)
MCH RBC QN AUTO: 29 PG (ref 26.1–32.9)
MCHC RBC AUTO-ENTMCNC: 32.6 G/DL (ref 31.4–35)
MCV RBC AUTO: 89 FL (ref 82–102)
MICROALBUMIN UR-MCNC: 1.36 MG/DL (ref 0–20)
MICROALBUMIN/CREAT UR-RTO: 7 MG/G (ref 0–30)
MONOCYTES # BLD: 0.67 K/UL (ref 0.1–1.3)
MONOCYTES NFR BLD: 8 % (ref 4–12)
NEUTS SEG # BLD: 5.64 K/UL (ref 1.7–8.2)
NEUTS SEG NFR BLD: 67 % (ref 43–78)
NRBC # BLD: 0 K/UL (ref 0–0.2)
PLATELET # BLD AUTO: 241 K/UL (ref 150–450)
PMV BLD AUTO: 10.1 FL (ref 9.4–12.3)
POTASSIUM SERPL-SCNC: 4.1 MMOL/L (ref 3.5–5.1)
RBC # BLD AUTO: 4.93 M/UL (ref 4.23–5.6)
SODIUM SERPL-SCNC: 142 MMOL/L (ref 136–145)
TRIGL SERPL-MCNC: 109 MG/DL (ref 0–150)
VLDLC SERPL CALC-MCNC: 22 MG/DL (ref 6–23)
WBC # BLD AUTO: 8.4 K/UL (ref 4.3–11.1)

## 2025-03-11 ENCOUNTER — OFFICE VISIT (OUTPATIENT)
Dept: FAMILY MEDICINE CLINIC | Facility: CLINIC | Age: 80
End: 2025-03-11
Payer: MEDICARE

## 2025-03-11 VITALS
TEMPERATURE: 98.7 F | HEART RATE: 73 BPM | DIASTOLIC BLOOD PRESSURE: 75 MMHG | HEIGHT: 69 IN | SYSTOLIC BLOOD PRESSURE: 152 MMHG | OXYGEN SATURATION: 97 % | WEIGHT: 221 LBS | BODY MASS INDEX: 32.73 KG/M2

## 2025-03-11 DIAGNOSIS — I10 PRIMARY HYPERTENSION: ICD-10-CM

## 2025-03-11 DIAGNOSIS — N18.32 TYPE 2 DIABETES MELLITUS WITH STAGE 3B CHRONIC KIDNEY DISEASE, WITHOUT LONG-TERM CURRENT USE OF INSULIN (HCC): ICD-10-CM

## 2025-03-11 DIAGNOSIS — N18.31 STAGE 3A CHRONIC KIDNEY DISEASE (HCC): ICD-10-CM

## 2025-03-11 DIAGNOSIS — E11.22 TYPE 2 DIABETES MELLITUS WITH STAGE 3B CHRONIC KIDNEY DISEASE, WITHOUT LONG-TERM CURRENT USE OF INSULIN (HCC): ICD-10-CM

## 2025-03-11 DIAGNOSIS — M48.061 SPINAL STENOSIS OF LUMBAR REGION WITHOUT NEUROGENIC CLAUDICATION: ICD-10-CM

## 2025-03-11 DIAGNOSIS — E78.00 PURE HYPERCHOLESTEROLEMIA: ICD-10-CM

## 2025-03-11 DIAGNOSIS — Z94.84 HX OF STEM CELL TRANSPLANT (HCC): ICD-10-CM

## 2025-03-11 DIAGNOSIS — E11.9 TYPE 2 DIABETES MELLITUS WITHOUT COMPLICATION, WITHOUT LONG-TERM CURRENT USE OF INSULIN (HCC): Primary | ICD-10-CM

## 2025-03-11 PROCEDURE — 3078F DIAST BP <80 MM HG: CPT | Performed by: FAMILY MEDICINE

## 2025-03-11 PROCEDURE — G8417 CALC BMI ABV UP PARAM F/U: HCPCS | Performed by: FAMILY MEDICINE

## 2025-03-11 PROCEDURE — 1123F ACP DISCUSS/DSCN MKR DOCD: CPT | Performed by: FAMILY MEDICINE

## 2025-03-11 PROCEDURE — 3077F SYST BP >= 140 MM HG: CPT | Performed by: FAMILY MEDICINE

## 2025-03-11 PROCEDURE — 99214 OFFICE O/P EST MOD 30 MIN: CPT | Performed by: FAMILY MEDICINE

## 2025-03-11 PROCEDURE — 3044F HG A1C LEVEL LT 7.0%: CPT | Performed by: FAMILY MEDICINE

## 2025-03-11 PROCEDURE — G8427 DOCREV CUR MEDS BY ELIG CLIN: HCPCS | Performed by: FAMILY MEDICINE

## 2025-03-11 PROCEDURE — 1036F TOBACCO NON-USER: CPT | Performed by: FAMILY MEDICINE

## 2025-03-11 PROCEDURE — G2211 COMPLEX E/M VISIT ADD ON: HCPCS | Performed by: FAMILY MEDICINE

## 2025-03-11 PROCEDURE — 1159F MED LIST DOCD IN RCRD: CPT | Performed by: FAMILY MEDICINE

## 2025-03-11 RX ORDER — VALSARTAN AND HYDROCHLOROTHIAZIDE 320; 25 MG/1; MG/1
1 TABLET, FILM COATED ORAL DAILY
Qty: 90 TABLET | Refills: 3 | Status: SHIPPED | OUTPATIENT
Start: 2025-03-11

## 2025-03-11 RX ORDER — ATORVASTATIN CALCIUM 20 MG/1
20 TABLET, FILM COATED ORAL DAILY
Qty: 90 TABLET | Refills: 1 | Status: SHIPPED | OUTPATIENT
Start: 2025-03-11

## 2025-03-11 NOTE — PROGRESS NOTES
[facility] [place] [address]  Phone: [phone] Fax [phone]  Iris Baxter MD  3/11/2025         Mr. Victor  is a 79 y.o.  year old  male patient who comes in to check on diabetes, hypertension, and high cholesterol.     History of Present Illness  The patient is a 79-year-old male who presents for a follow-up on his diabetes, spinal stenosis, and left knee osteoarthritis.    He reports no current issues with his diabetes. He maintains hydration primarily through water intake, occasionally consuming Sprite, but not on a regular basis. His diet includes water with every meal, except breakfast. He attempts to maintain an active lifestyle within the home environment. He does not experience any numbness or tingling in his feet.    He has been diagnosed with spinal stenosis. He can walk, but after walking for a few minutes, his hips start to hurt. Initially, he thought his hips were going bad, so he went to [facility] at the spine center. She informed him that his hips are fine and the pain is coming from his back. He prefers not to have surgery. He tries to stay active around the house, but he needs to go back to the gym and use the machines. It is hard with the spinal stenosis as it limits his activity. He gets out in the yard, but especially if he bends over, his back and hip start to hurt. He has a little stool that he sits on, which relieves the pain. Sometimes, he goes out and works, then goes back in the house to rest for a few minutes before going back out again. This limits what he can do. He does not want surgery because his brother had surgery and  while in the hospital due to a blood clot. He was complaining about his leg hurting, and they called him about 2:00 in the morning to inform him that his brother had passed away. He had injections in his back, which helped for a short while, maybe a month. He is probably due for more and will call to make an

## 2025-03-17 ENCOUNTER — OFFICE VISIT (OUTPATIENT)
Dept: ORTHOPEDIC SURGERY | Age: 80
End: 2025-03-17
Payer: MEDICARE

## 2025-03-17 DIAGNOSIS — M17.12 OSTEOARTHRITIS OF LEFT KNEE, UNSPECIFIED OSTEOARTHRITIS TYPE: Primary | ICD-10-CM

## 2025-03-17 PROCEDURE — 20611 DRAIN/INJ JOINT/BURSA W/US: CPT | Performed by: PHYSICIAN ASSISTANT

## 2025-03-17 NOTE — PROGRESS NOTES
Name: Thompson Victor  YOB: 1945  Gender: male  MRN: [id number]      CC: Left Knee Pain     HPI: Patient received left knee cortisone injection 4 weeks ago which provided a couple weeks of substantial relief before wearing off.  He has decided to schedule left TKA for September 2nd as he needs to be available to help care for his wife after her TKA with Dr. Giron in July.  He desires to start left knee HP today.  No other new complaints.    PROCEDURE: 1 of 3 HP    DIAGNOSIS:   Encounter Diagnosis   Name Primary?    Osteoarthritis of left knee, unspecified osteoarthritis type Yes        [employer] US unit with variable frequency (6.0-15.0 MHz) linear transducer was used to visualize the retropatellar fat pad, patella tendon, patella, tibia, and to ensure proper intra-articular needle placement.  Injection image was saved to patient's permanent chart.    Procedure Note: Time out was performed which included identifying the patient by name and date of birth.  The procedure site was identified with all present in agreement.   The patient was placed in upright position with knee hanging freely from exam table.  The left knee was prepped in sterile fashion using alcohol wipe.  Using [employer] ultrasound guidance, a 22 gauge needle was then introduced into the knee joint from an infrapatellar approach and 2 mL of Synvisc was injected freely.  The needle was then removed, pressure hemostasis achieved, injection site was cleansed with alcohol wipe and dressed with band aid.    The patient tolerated the procedure without complication and he will follow up as scheduled.

## 2025-03-24 ENCOUNTER — OFFICE VISIT (OUTPATIENT)
Dept: ORTHOPEDIC SURGERY | Age: 80
End: 2025-03-24
Payer: MEDICARE

## 2025-03-24 DIAGNOSIS — M17.12 OSTEOARTHRITIS OF LEFT KNEE, UNSPECIFIED OSTEOARTHRITIS TYPE: Primary | ICD-10-CM

## 2025-03-24 PROCEDURE — 20611 DRAIN/INJ JOINT/BURSA W/US: CPT | Performed by: PHYSICIAN ASSISTANT

## 2025-03-24 NOTE — PROGRESS NOTES
Name: Thompson Victor  YOB: 1945  Gender: male  MRN: [id number]      CC: Left Knee Pain     PROCEDURE: 2 of 3 HP    DIAGNOSIS:   Encounter Diagnosis   Name Primary?    Osteoarthritis of left knee, unspecified osteoarthritis type Yes        [employer] US unit with variable frequency (6.0-15.0 MHz) linear transducer was used to visualize the retropatellar fat pad, patella tendon, patella, tibia, and to ensure proper intra-articular needle placement.  Injection image was saved to patient's permanent chart.    Procedure Note: Time out was performed which included identifying the patient by name and date of birth.  The procedure site was identified with all present in agreement.   The patient was placed in upright position with knee hanging freely from exam table.  The left knee was prepped in sterile fashion using alcohol wipe.  Using [employer] ultrasound guidance, a 22 gauge needle was then introduced into the knee joint from an infrapatellar approach and 2 mL of Synvisc was injected freely.  The needle was then removed, pressure hemostasis achieved, injection site was cleansed with alcohol wipe and dressed with band aid.    The patient tolerated the procedure without complication.  The patient  will follow up as scheduled.

## 2025-03-31 ENCOUNTER — OFFICE VISIT (OUTPATIENT)
Dept: ORTHOPEDIC SURGERY | Age: 80
End: 2025-03-31

## 2025-03-31 DIAGNOSIS — M17.12 OSTEOARTHRITIS OF LEFT KNEE, UNSPECIFIED OSTEOARTHRITIS TYPE: Primary | ICD-10-CM

## 2025-03-31 NOTE — PROGRESS NOTES
Name: Thompson Victor  YOB: 1945  Gender: male  MRN: [id number]      CC: Left Knee Pain     PROCEDURE: 3 of 3 HP    DIAGNOSIS:   Encounter Diagnosis   Name Primary?    Osteoarthritis of left knee, unspecified osteoarthritis type Yes        [employer] US unit with variable frequency (6.0-15.0 MHz) linear transducer was used to visualize the retropatellar fat pad, patella tendon, patella, tibia, and to ensure proper intra-articular needle placement.  Injection image was saved to patient's permanent chart.    Procedure Note: Time out was performed which included identifying the patient by name and date of birth.  The procedure site was identified with all present in agreement.   The patient was placed in upright position with knee hanging freely from exam table.  The left knee was prepped in sterile fashion using alcohol wipe.  Using [employer] ultrasound guidance, a 22 gauge needle was then introduced into the knee joint from an infrapatellar approach and 2 mL of Synvisc was injected freely.  The needle was then removed, pressure hemostasis achieved, injection site was cleansed with alcohol wipe and dressed with band aid.    The patient tolerated the procedure without complication.  The patient  will follow up as scheduled.  He is scheduled for a TKA in December for medial compartment DJD.  His wife has a knee replacement set with my associate Dr. Giron in July

## 2025-04-08 ENCOUNTER — TELEPHONE (OUTPATIENT)
Dept: FAMILY MEDICINE CLINIC | Facility: CLINIC | Age: 80
End: 2025-04-08

## 2025-04-08 NOTE — TELEPHONE ENCOUNTER
Request received from [facility], Dr Marge Boyd DMD for notes to clear patient for oral surgery on 04/21/25. Placed in clinical staff tray in front office for Dr Baxter.

## 2025-04-28 ENCOUNTER — OFFICE VISIT (OUTPATIENT)
Dept: UROLOGY | Age: 80
End: 2025-04-28
Payer: MEDICARE

## 2025-04-28 DIAGNOSIS — N40.1 BPH WITH OBSTRUCTION/LOWER URINARY TRACT SYMPTOMS: Primary | ICD-10-CM

## 2025-04-28 DIAGNOSIS — N13.8 BPH WITH OBSTRUCTION/LOWER URINARY TRACT SYMPTOMS: Primary | ICD-10-CM

## 2025-04-28 LAB
BILIRUBIN, URINE, POC: NEGATIVE
BLOOD URINE, POC: NORMAL
GLUCOSE URINE, POC: NEGATIVE MG/DL
KETONES, URINE, POC: NEGATIVE MG/DL
LEUKOCYTE ESTERASE, URINE, POC: NEGATIVE
NITRITE, URINE, POC: NEGATIVE
PH, URINE, POC: 5.5 (ref 4.6–8)
PROTEIN,URINE, POC: NEGATIVE MG/DL
SPECIFIC GRAVITY, URINE, POC: 1.02 (ref 1–1.03)
URINALYSIS CLARITY, POC: NORMAL
URINALYSIS COLOR, POC: NORMAL
UROBILINOGEN, POC: NORMAL MG/DL

## 2025-04-28 PROCEDURE — 1123F ACP DISCUSS/DSCN MKR DOCD: CPT | Performed by: UROLOGY

## 2025-04-28 PROCEDURE — G8417 CALC BMI ABV UP PARAM F/U: HCPCS | Performed by: UROLOGY

## 2025-04-28 PROCEDURE — 1036F TOBACCO NON-USER: CPT | Performed by: UROLOGY

## 2025-04-28 PROCEDURE — 1159F MED LIST DOCD IN RCRD: CPT | Performed by: UROLOGY

## 2025-04-28 PROCEDURE — G8427 DOCREV CUR MEDS BY ELIG CLIN: HCPCS | Performed by: UROLOGY

## 2025-04-28 PROCEDURE — 81003 URINALYSIS AUTO W/O SCOPE: CPT | Performed by: UROLOGY

## 2025-04-28 PROCEDURE — 99212 OFFICE O/P EST SF 10 MIN: CPT | Performed by: UROLOGY

## 2025-04-28 ASSESSMENT — ENCOUNTER SYMPTOMS: WHEEZING: 0

## 2025-04-28 NOTE — PROGRESS NOTES
[facility]  [address] [place] [address]  [phone]    Thompson Victor  : 1945    Chief Complaint   Patient presents with    Follow-up        HPI     Thompson Victor is a 79 y.o. male  with hx of BPH/LUTS, family hx of prostate cancer.   Pt was last seen in , excerpt from that note: Referred by Dr.Julie Baxter for evaluation and treatment of BPH/LUTS. complaining of a several year history of urinary frequency and would like to be evaluated for the UroLift procedure.  He is not having any burning with urination.  He is otherwise healthy.  He gets up about 2-3 times a night to void and he also pees about every 2 hours.  has been having nocturia for about one year.   Nocturia is better when he takes his diuretic in the AM, instead of at night. Nocturia x 1 when he does that.   Has slow stream , post void dribbling, incomplete emptying.   Hx of HTN and type II diabetes.   His father had prostate cancer; had prostatectomy then had metastatic prostate cancer.   His brother had a Urolift and has done well.      Was started on tamsulosin in . PSA 0.9 in . Referred back by Dr. Iris Baxter because of urinary frequency. PSA 1.5 in . He c/o nocturia x 3-5. Stream is weak. No incontinence. Tamsulosin didn't help; he is no longer on it .   MIMI: prostate not enlarged, no nodules  Mild anal stenosis  Cysto in : Prostatic urethra:  moderate prostatic hyperplasia, without bladder neck contracture. Lateral lobe hypertrophy, fossa length 3 cm .   PVR 46 ml   IPSS is 22, highest score of 4 for frequency, urgency and weak stream.  Discussed medical and surgical options for BPH. Discussed rezum and TURP.   Gave info about Rezum.   Urocuff showed low pressure, low flow. Voided volume 46 ml, PVR 0.   May have OAB.tamsulosin didn't help sxs. Started Gemtesa in . He didn't get it because of the cost. Has nocturia x 2.   PVR 8 ml by US today.     Past Medical History:

## 2025-04-30 ENCOUNTER — OFFICE VISIT (OUTPATIENT)
Dept: FAMILY MEDICINE CLINIC | Facility: CLINIC | Age: 80
End: 2025-04-30
Payer: MEDICARE

## 2025-04-30 VITALS
HEART RATE: 69 BPM | TEMPERATURE: 98.1 F | BODY MASS INDEX: 31.96 KG/M2 | HEIGHT: 69 IN | SYSTOLIC BLOOD PRESSURE: 153 MMHG | DIASTOLIC BLOOD PRESSURE: 66 MMHG | WEIGHT: 215.8 LBS | OXYGEN SATURATION: 96 %

## 2025-04-30 DIAGNOSIS — I49.3 PVC (PREMATURE VENTRICULAR CONTRACTION): Primary | ICD-10-CM

## 2025-04-30 DIAGNOSIS — E11.9 TYPE 2 DIABETES MELLITUS WITHOUT COMPLICATION, WITHOUT LONG-TERM CURRENT USE OF INSULIN (HCC): ICD-10-CM

## 2025-04-30 LAB — TSH, 3RD GENERATION: 2.53 UIU/ML (ref 0.27–4.2)

## 2025-04-30 PROCEDURE — 1160F RVW MEDS BY RX/DR IN RCRD: CPT | Performed by: FAMILY MEDICINE

## 2025-04-30 PROCEDURE — 3044F HG A1C LEVEL LT 7.0%: CPT | Performed by: FAMILY MEDICINE

## 2025-04-30 PROCEDURE — 3077F SYST BP >= 140 MM HG: CPT | Performed by: FAMILY MEDICINE

## 2025-04-30 PROCEDURE — 3078F DIAST BP <80 MM HG: CPT | Performed by: FAMILY MEDICINE

## 2025-04-30 PROCEDURE — 1159F MED LIST DOCD IN RCRD: CPT | Performed by: FAMILY MEDICINE

## 2025-04-30 PROCEDURE — G8427 DOCREV CUR MEDS BY ELIG CLIN: HCPCS | Performed by: FAMILY MEDICINE

## 2025-04-30 PROCEDURE — G8417 CALC BMI ABV UP PARAM F/U: HCPCS | Performed by: FAMILY MEDICINE

## 2025-04-30 PROCEDURE — 1036F TOBACCO NON-USER: CPT | Performed by: FAMILY MEDICINE

## 2025-04-30 PROCEDURE — G2211 COMPLEX E/M VISIT ADD ON: HCPCS | Performed by: FAMILY MEDICINE

## 2025-04-30 PROCEDURE — 93000 ELECTROCARDIOGRAM COMPLETE: CPT | Performed by: FAMILY MEDICINE

## 2025-04-30 PROCEDURE — 99214 OFFICE O/P EST MOD 30 MIN: CPT | Performed by: FAMILY MEDICINE

## 2025-04-30 PROCEDURE — 1123F ACP DISCUSS/DSCN MKR DOCD: CPT | Performed by: FAMILY MEDICINE

## 2025-04-30 SDOH — ECONOMIC STABILITY: FOOD INSECURITY: WITHIN THE PAST 12 MONTHS, THE FOOD YOU BOUGHT JUST DIDN'T LAST AND YOU DIDN'T HAVE MONEY TO GET MORE.: NEVER TRUE

## 2025-04-30 SDOH — ECONOMIC STABILITY: FOOD INSECURITY: WITHIN THE PAST 12 MONTHS, YOU WORRIED THAT YOUR FOOD WOULD RUN OUT BEFORE YOU GOT MONEY TO BUY MORE.: NEVER TRUE

## 2025-04-30 ASSESSMENT — PATIENT HEALTH QUESTIONNAIRE - PHQ9
SUM OF ALL RESPONSES TO PHQ QUESTIONS 1-9: 0
SUM OF ALL RESPONSES TO PHQ QUESTIONS 1-9: 0
2. FEELING DOWN, DEPRESSED OR HOPELESS: NOT AT ALL
SUM OF ALL RESPONSES TO PHQ QUESTIONS 1-9: 0
1. LITTLE INTEREST OR PLEASURE IN DOING THINGS: NOT AT ALL
SUM OF ALL RESPONSES TO PHQ QUESTIONS 1-9: 0

## 2025-04-30 NOTE — Clinical Note
Thompson Victor                                                                [address] [place] [address] [place] [address]         4/30/25     Dear Mr. Victor:  MRN:[id number]    This message is regarding a referral that needs to be scheduled. We were unable to reach you by phone; however, your referral is available for review in [facility] under insurance in the drop down menu. We will be making further attempts to contact you to get this scheduled.       Thank you,  Joel [facility]

## 2025-04-30 NOTE — PROGRESS NOTES
[facility] [place] [address]  Phone: [phone] Fax [phone]  Iris Baxter MD  4/30/2025              History of Present Illness  The patient is a 79-year-old male who presents for evaluation of skipped heartbeats.    During a recent dental procedure involving implants, sedation was administered. Upon recovery, skipped heartbeats were noted, a condition he had not previously experienced. There is no history of cardiac issues, and he does not perceive the irregular heartbeats. However, occasional lightheadedness is reported, almost to the point of fainting, but without actual syncope. No chest pain is reported. Blood pressure is monitored daily and typically remains within normal limits. He is scheduled for knee surgery on 09/02/2025.      Mr. Victor  has  has a past medical history of Arthritis, Chronic renal disease, stage III (HCC) [231349], Hyperlipidemia, Hypertension, and Type II or unspecified type diabetes mellitus without mention of complication, not stated as uncontrolled.    Mr. Victor  has  has a past surgical history that includes orthopedic surgery; pr unlisted procedure abdomen peritoneum & omentum; orthopedic surgery (03/2013); heent (11/2014); heent; Limbal stem cell transplant (Left); Colonoscopy; and Shoulder arthroscopy (Left, 7/6/2022).    Mr. Victor   Current Outpatient Medications   Medication Sig Dispense Refill    valsartan-hydroCHLOROthiazide (DIOVAN-HCT) 320-25 MG per tablet Take 1 tablet by mouth daily 90 tablet 3    atorvastatin (LIPITOR) 20 MG tablet Take 1 tablet by mouth daily 90 tablet 1    tamsulosin (FLOMAX) 0.4 MG capsule Take 1 capsule by mouth daily 90 capsule 3    Coenzyme Q10 (COQ10 PO) Take by mouth daily       Turmeric 400 MG CAPS Take by mouth      Multiple Vitamin (MULTIVITAMIN ADULT PO) Take by mouth daily       No current facility-administered medications for this visit.       Mr. Victor   Social History

## 2025-05-01 ENCOUNTER — RESULTS FOLLOW-UP (OUTPATIENT)
Dept: FAMILY MEDICINE CLINIC | Facility: CLINIC | Age: 80
End: 2025-05-01

## 2025-06-17 ENCOUNTER — INITIAL CONSULT (OUTPATIENT)
Age: 80
End: 2025-06-17
Payer: MEDICARE

## 2025-06-17 VITALS
BODY MASS INDEX: 33.03 KG/M2 | DIASTOLIC BLOOD PRESSURE: 82 MMHG | HEIGHT: 69 IN | WEIGHT: 223 LBS | HEART RATE: 64 BPM | SYSTOLIC BLOOD PRESSURE: 130 MMHG

## 2025-06-17 DIAGNOSIS — I45.9 CARDIAC CONDUCTION DISORDER: ICD-10-CM

## 2025-06-17 DIAGNOSIS — I49.3 VENTRICULAR ECTOPY: Primary | ICD-10-CM

## 2025-06-17 PROCEDURE — 1036F TOBACCO NON-USER: CPT | Performed by: INTERNAL MEDICINE

## 2025-06-17 PROCEDURE — 3079F DIAST BP 80-89 MM HG: CPT | Performed by: INTERNAL MEDICINE

## 2025-06-17 PROCEDURE — 1123F ACP DISCUSS/DSCN MKR DOCD: CPT | Performed by: INTERNAL MEDICINE

## 2025-06-17 PROCEDURE — G8428 CUR MEDS NOT DOCUMENT: HCPCS | Performed by: INTERNAL MEDICINE

## 2025-06-17 PROCEDURE — 3075F SYST BP GE 130 - 139MM HG: CPT | Performed by: INTERNAL MEDICINE

## 2025-06-17 PROCEDURE — 93000 ELECTROCARDIOGRAM COMPLETE: CPT | Performed by: INTERNAL MEDICINE

## 2025-06-17 PROCEDURE — 99203 OFFICE O/P NEW LOW 30 MIN: CPT | Performed by: INTERNAL MEDICINE

## 2025-06-17 PROCEDURE — 1126F AMNT PAIN NOTED NONE PRSNT: CPT | Performed by: INTERNAL MEDICINE

## 2025-06-17 PROCEDURE — G8417 CALC BMI ABV UP PARAM F/U: HCPCS | Performed by: INTERNAL MEDICINE

## 2025-06-17 NOTE — PROGRESS NOTES
[facility] [[email or web address]] CARDIOLOGY  [address] [place] [address], [address]  [place] [address]  PHONE: [phone]        25        NAME:  Thompson Victor  : 1945  MRN: [id number]     CHIEF COMPLAINT:    Consultation      SUBJECTIVE:       Recent PVC activity around a dental procedure.  No cp or sob or palpitation.  No past cardiac hx.  For left TKA    Phx:  Diabetic  Htn     Fhx:  ? brother     Medications were all reviewed with the patient today and updated as necessary.   Current Outpatient Medications   Medication Sig    Elderberry 500 MG CAPS Take by mouth    Omega-3 Fatty Acids (OMEGA-3 PLUS PO) Take by mouth    valsartan-hydroCHLOROthiazide (DIOVAN-HCT) 320-25 MG per tablet Take 1 tablet by mouth daily    atorvastatin (LIPITOR) 20 MG tablet Take 1 tablet by mouth daily    tamsulosin (FLOMAX) 0.4 MG capsule Take 1 capsule by mouth daily    Coenzyme Q10 (COQ10 PO) Take by mouth daily     Turmeric 400 MG CAPS Take by mouth    Multiple Vitamin (MULTIVITAMIN ADULT PO) Take by mouth daily     No current facility-administered medications for this visit.        No Known Allergies        PHYSICAL EXAM:     Wt Readings from Last 3 Encounters:   25 101.2 kg (223 lb)   25 97.9 kg (215 lb 12.8 oz)   25 100.2 kg (221 lb)     BP Readings from Last 3 Encounters:   25 130/82   25 (!) 153/66   25 (!) 152/75       /82   Pulse 64   Ht 1.753 m (5' 9\")   Wt 101.2 kg (223 lb)   BMI 32.93 kg/m²     Physical Exam  Vitals reviewed.   HENT:      Head: Normocephalic and atraumatic.   Eyes:      Extraocular Movements: Extraocular movements intact.      Pupils: Pupils are equal, round, and reactive to light.   Cardiovascular:      Rate and Rhythm: Normal rate.      Heart sounds: Normal heart sounds.   Pulmonary:      Effort: Pulmonary effort is normal.      Breath sounds: Normal breath sounds.   Abdominal:      General: Abdomen is flat.      Palpations: Abdomen is soft. There is no mass.

## 2025-07-15 DIAGNOSIS — M17.12 OSTEOARTHRITIS OF LEFT KNEE, UNSPECIFIED OSTEOARTHRITIS TYPE: Primary | ICD-10-CM

## 2025-07-18 NOTE — PROGRESS NOTES
General: No focal deficit present.      Mental Status: He is alert and oriented to person, place, and time.   Psychiatric:         Mood and Affect: Mood normal.           RECENT LABS AND RECORDS REVIEW    EKG:  Sinus Rhythm -frequent multiform ectopic ventricular beats   Right bundle branch block with left axis -bifascicular block.    Nuke: No ischemia, EF 68%      No results found for any visits on 07/22/25.   ASSESSMENT and PLAN    Thompson \"Venancio\" was seen today for irregular heart beat and results.    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       ROLANDO RODRIGUEZ MD  7/22/2025  8:14 AM

## 2025-07-22 ENCOUNTER — OFFICE VISIT (OUTPATIENT)
Age: 80
End: 2025-07-22
Payer: MEDICARE

## 2025-07-22 VITALS
WEIGHT: 217 LBS | SYSTOLIC BLOOD PRESSURE: 149 MMHG | HEART RATE: 63 BPM | HEIGHT: 69 IN | BODY MASS INDEX: 32.14 KG/M2 | DIASTOLIC BLOOD PRESSURE: 73 MMHG

## 2025-07-22 DIAGNOSIS — I45.2 RBBB (RIGHT BUNDLE BRANCH BLOCK WITH LEFT ANTERIOR FASCICULAR BLOCK): ICD-10-CM

## 2025-07-22 DIAGNOSIS — Z01.818 PRE-OP EVALUATION: ICD-10-CM

## 2025-07-22 DIAGNOSIS — I49.3 VENTRICULAR ECTOPY: Primary | ICD-10-CM

## 2025-07-22 PROCEDURE — G8417 CALC BMI ABV UP PARAM F/U: HCPCS | Performed by: INTERNAL MEDICINE

## 2025-07-22 PROCEDURE — 1126F AMNT PAIN NOTED NONE PRSNT: CPT | Performed by: INTERNAL MEDICINE

## 2025-07-22 PROCEDURE — 99214 OFFICE O/P EST MOD 30 MIN: CPT | Performed by: INTERNAL MEDICINE

## 2025-07-22 PROCEDURE — 1123F ACP DISCUSS/DSCN MKR DOCD: CPT | Performed by: INTERNAL MEDICINE

## 2025-07-22 PROCEDURE — 1036F TOBACCO NON-USER: CPT | Performed by: INTERNAL MEDICINE

## 2025-07-22 PROCEDURE — 3077F SYST BP >= 140 MM HG: CPT | Performed by: INTERNAL MEDICINE

## 2025-07-22 PROCEDURE — 3078F DIAST BP <80 MM HG: CPT | Performed by: INTERNAL MEDICINE

## 2025-07-22 PROCEDURE — 1159F MED LIST DOCD IN RCRD: CPT | Performed by: INTERNAL MEDICINE

## 2025-07-22 PROCEDURE — G8427 DOCREV CUR MEDS BY ELIG CLIN: HCPCS | Performed by: INTERNAL MEDICINE

## 2025-08-08 DIAGNOSIS — E78.00 PURE HYPERCHOLESTEROLEMIA: ICD-10-CM

## 2025-08-11 RX ORDER — ATORVASTATIN CALCIUM 20 MG/1
20 TABLET, FILM COATED ORAL DAILY
Qty: 90 TABLET | Refills: 3 | Status: SHIPPED | OUTPATIENT
Start: 2025-08-11

## 2025-09-02 ENCOUNTER — HOSPITAL ENCOUNTER (OUTPATIENT)
Dept: REHABILITATION | Age: 80
Discharge: HOME | End: 2025-09-05
Payer: MEDICARE

## 2025-09-02 ENCOUNTER — HOSPITAL ENCOUNTER (OUTPATIENT)
Dept: SURGERY | Age: 80
Discharge: HOME | End: 2025-09-05
Payer: MEDICARE

## 2025-09-02 VITALS
HEART RATE: 55 BPM | HEIGHT: 69 IN | SYSTOLIC BLOOD PRESSURE: 128 MMHG | TEMPERATURE: 98.4 F | BODY MASS INDEX: 30.86 KG/M2 | OXYGEN SATURATION: 97 % | WEIGHT: 208.34 LBS | DIASTOLIC BLOOD PRESSURE: 72 MMHG | RESPIRATION RATE: 16 BRPM

## 2025-09-02 DIAGNOSIS — Z01.818 PREOP EXAMINATION: ICD-10-CM

## 2025-09-02 DIAGNOSIS — Z96.652 S/P TOTAL KNEE ARTHROPLASTY, LEFT: Primary | ICD-10-CM

## 2025-09-02 LAB
ALBUMIN SERPL-MCNC: 3.8 G/DL (ref 3.2–4.6)
ALBUMIN/GLOB SERPL: 1.3 (ref 1–1.9)
ALP SERPL-CCNC: 86 U/L (ref 40–129)
ALT SERPL-CCNC: 26 U/L (ref 8–55)
ANION GAP SERPL CALC-SCNC: 13 MMOL/L (ref 7–16)
APTT PPP: 25.4 SEC (ref 23.3–37.4)
AST SERPL-CCNC: 25 U/L (ref 15–37)
BASOPHILS # BLD: 0.05 K/UL (ref 0–0.2)
BASOPHILS NFR BLD: 0.6 % (ref 0–2)
BILIRUB SERPL-MCNC: 0.4 MG/DL (ref 0–1.2)
BUN SERPL-MCNC: 23 MG/DL (ref 8–23)
CALCIUM SERPL-MCNC: 9.3 MG/DL (ref 8.8–10.2)
CHLORIDE SERPL-SCNC: 102 MMOL/L (ref 98–107)
CO2 SERPL-SCNC: 25 MMOL/L (ref 20–29)
CREAT SERPL-MCNC: 1.22 MG/DL (ref 0.8–1.3)
DIFFERENTIAL METHOD BLD: ABNORMAL
EOSINOPHIL # BLD: 0.29 K/UL (ref 0–0.8)
EOSINOPHIL NFR BLD: 3.4 % (ref 0.5–7.8)
ERYTHROCYTE [DISTWIDTH] IN BLOOD BY AUTOMATED COUNT: 13.7 % (ref 11.9–14.6)
EST. AVERAGE GLUCOSE BLD GHB EST-MCNC: 139 MG/DL
GLOBULIN SER CALC-MCNC: 2.9 G/DL (ref 2.3–3.5)
GLUCOSE SERPL-MCNC: 104 MG/DL (ref 70–99)
HBA1C MFR BLD: 6.5 % (ref 0–5.6)
HCT VFR BLD AUTO: 40.3 % (ref 41.1–50.3)
HGB BLD-MCNC: 13 G/DL (ref 13.6–17.2)
IMM GRANULOCYTES # BLD AUTO: 0.02 K/UL (ref 0–0.5)
IMM GRANULOCYTES NFR BLD AUTO: 0.2 % (ref 0–5)
INR PPP: 1
LYMPHOCYTES # BLD: 1.91 K/UL (ref 0.5–4.6)
LYMPHOCYTES NFR BLD: 22.6 % (ref 13–44)
MCH RBC QN AUTO: 27.8 PG (ref 26.1–32.9)
MCHC RBC AUTO-ENTMCNC: 32.3 G/DL (ref 31.4–35)
MCV RBC AUTO: 86.3 FL (ref 82–102)
MONOCYTES # BLD: 0.87 K/UL (ref 0.1–1.3)
MONOCYTES NFR BLD: 10.3 % (ref 4–12)
NEUTS SEG # BLD: 5.32 K/UL (ref 1.7–8.2)
NEUTS SEG NFR BLD: 62.9 % (ref 43–78)
NRBC # BLD: 0 K/UL (ref 0–0.2)
PLATELET # BLD AUTO: 239 K/UL (ref 150–450)
PMV BLD AUTO: 10.4 FL (ref 9.4–12.3)
POTASSIUM SERPL-SCNC: 3.8 MMOL/L (ref 3.5–5.1)
PROT SERPL-MCNC: 6.7 G/DL (ref 6.3–8.2)
PROTHROMBIN TIME: 13.5 SEC (ref 11.3–14.9)
RBC # BLD AUTO: 4.67 M/UL (ref 4.23–5.6)
SODIUM SERPL-SCNC: 140 MMOL/L (ref 136–145)
WBC # BLD AUTO: 8.5 K/UL (ref 4.3–11.1)

## 2025-09-02 PROCEDURE — 80053 COMPREHEN METABOLIC PANEL: CPT

## 2025-09-02 PROCEDURE — 94760 N-INVAS EAR/PLS OXIMETRY 1: CPT

## 2025-09-02 PROCEDURE — 85610 PROTHROMBIN TIME: CPT

## 2025-09-02 PROCEDURE — 98960 EDU&TRN PT SELF-MGMT NQHP 1: CPT

## 2025-09-02 PROCEDURE — 85025 COMPLETE CBC W/AUTO DIFF WBC: CPT

## 2025-09-02 PROCEDURE — 85730 THROMBOPLASTIN TIME PARTIAL: CPT

## 2025-09-02 PROCEDURE — 83036 HEMOGLOBIN GLYCOSYLATED A1C: CPT

## 2025-09-02 PROCEDURE — 97161 PT EVAL LOW COMPLEX 20 MIN: CPT

## 2025-09-02 ASSESSMENT — KOOS JR
HOW SEVERE IS YOUR KNEE STIFFNESS AFTER FIRST WAKING IN MORNING: MODERATE
STRAIGHTENING KNEE FULLY: MODERATE
BENDING TO THE FLOOR TO PICK UP OBJECT: MODERATE
GOING UP OR DOWN STAIRS: MODERATE
TWISING OR PIVOTING ON KNEE: MODERATE
KOOS JR TOTAL INTERVAL SCORE: 52.465
RISING FROM SITTING: MODERATE
STANDING UPRIGHT: MODERATE

## 2025-09-02 ASSESSMENT — PAIN DESCRIPTION - LOCATION: LOCATION: KNEE

## 2025-09-02 ASSESSMENT — PULMONARY FUNCTION TESTS
FEV1 (%PREDICTED): 96
FEV1 (LITERS): 2.66

## 2025-09-02 ASSESSMENT — PAIN DESCRIPTION - ORIENTATION: ORIENTATION: LEFT

## 2025-09-02 ASSESSMENT — PAIN SCALES - GENERAL: PAINLEVEL_OUTOF10: 4

## 2025-09-03 ASSESSMENT — PROMIS GLOBAL HEALTH SCALE
SUM OF RESPONSES TO QUESTIONS 2, 4, 5, & 10: 15
HOW IS THE PROMIS V1.1 BEING ADMINISTERED?: PAPER
IN THE PAST 7 DAYS, HOW WOULD YOU RATE YOUR PAIN ON AVERAGE [ON A SCALE FROM 0 (NO PAIN) TO 10 (WORST IMAGINABLE PAIN)]?: 4
IN GENERAL, WOULD YOU SAY YOUR QUALITY OF LIFE IS...[ON A SCALE OF 1 (POOR) TO 5 (EXCELLENT)]: GOOD
IN THE PAST 7 DAYS, HOW OFTEN HAVE YOU BEEN BOTHERED BY EMOTIONAL PROBLEMS, SUCH AS FEELING ANXIOUS, DEPRESSED, OR IRRITABLE [ON A SCALE FROM 1 (NEVER) TO 5 (ALWAYS)]?: RARELY
IN THE PAST 7 DAYS, HOW WOULD YOU RATE YOUR FATIGUE ON AVERAGE [ON A SCALE FROM 1 (NONE) TO 5 (VERY SEVERE)]?: MODERATE
IN GENERAL, PLEASE RATE HOW WELL YOU CARRY OUT YOUR USUAL SOCIAL ACTIVITIES (INCLUDES ACTIVITIES AT HOME, AT WORK, AND IN YOUR COMMUNITY, AND RESPONSIBILITIES AS A PARENT, CHILD, SPOUSE, EMPLOYEE, FRIEND, ETC) [ON A SCALE OF 1 (POOR) TO 5 (EXCELLENT)]?: VERY GOOD
IN GENERAL, HOW WOULD YOU RATE YOUR SATISFACTION WITH YOUR SOCIAL ACTIVITIES AND RELATIONSHIPS [ON A SCALE OF 1 (POOR) TO 5 (EXCELLENT)]?: VERY GOOD
SUM OF RESPONSES TO QUESTIONS 3, 6, 7, & 8: 14
IN GENERAL, WOULD YOU SAY YOUR HEALTH IS...[ON A SCALE OF 1 (POOR) TO 5 (EXCELLENT)]: GOOD
WHO IS THE PERSON COMPLETING THE PROMIS V1.1 SURVEY?: SELF
IN GENERAL, HOW WOULD YOU RATE YOUR PHYSICAL HEALTH [ON A SCALE OF 1 (POOR) TO 5 (EXCELLENT)]?: GOOD
TO WHAT EXTENT ARE YOU ABLE TO CARRY OUT YOUR EVERYDAY PHYSICAL ACTIVITIES SUCH AS WALKING, CLIMBING STAIRS, CARRYING GROCERIES, OR MOVING A CHAIR [ON A SCALE OF 1 (NOT AT ALL) TO 5 (COMPLETELY)]?: MOSTLY
IN GENERAL, HOW WOULD YOU RATE YOUR MENTAL HEALTH, INCLUDING YOUR MOOD AND YOUR ABILITY TO THINK [ON A SCALE OF 1 (POOR) TO 5 (EXCELLENT)]?: VERY GOOD

## (undated) DEVICE — STOCKINETTE,IMPERVIOUS,12X48,STERILE: Brand: MEDLINE

## (undated) DEVICE — CANNULA ARTHSCP L3CM ID8MM DBL DAM 1 PC MOLD LO PROF FLNG

## (undated) DEVICE — NEEDLE SUT PASS FOR ROT CUF LABRAL REP MULTFI SCORPION

## (undated) DEVICE — INTENDED FOR TISSUE SEPARATION, AND OTHER PROCEDURES THAT REQUIRE A SHARP SURGICAL BLADE TO PUNCTURE OR CUT.: Brand: BARD-PARKER ® STAINLESS STEEL BLADES

## (undated) DEVICE — SHOULDER ARTHRO DR KOCH: Brand: MEDLINE INDUSTRIES, INC.

## (undated) DEVICE — PACK,SHOULDER,DRAPE,POUCH: Brand: MEDLINE

## (undated) DEVICE — 3M™ STERI-DRAPE™ U-DRAPE 1015: Brand: STERI-DRAPE™

## (undated) DEVICE — PAD,NON-ADHERENT,3X8,STERILE,LF,1/PK: Brand: MEDLINE

## (undated) DEVICE — SPONGE GZ W4XL4IN COT 12 PLY TYP VII WVN C FLD DSGN

## (undated) DEVICE — PROBE ABLAT XL 90DEG ASPIR BPLR RF 1 PC ELECTRD ERGO HNDL

## (undated) DEVICE — SUTURE ABSORBABLE BRAIDED 0 CT-1 8X27 IN UD VICRYL JJ41G

## (undated) DEVICE — PAD,ABDOMINAL,5"X9",ST,LF,25/BX: Brand: MEDLINE INDUSTRIES, INC.

## (undated) DEVICE — 1010 S-DRAPE TOWEL DRAPE 10/BX: Brand: STERI-DRAPE™

## (undated) DEVICE — 3M™ IOBAN™ 2 ANTIMICROBIAL INCISE DRAPE 6650EZ: Brand: IOBAN™ 2

## (undated) DEVICE — TUBING PMP L16FT MAIN DISP FOR AR-6400 AR-6475

## (undated) DEVICE — SHEET,DRAPE,53X77,STERILE: Brand: MEDLINE

## (undated) DEVICE — DRAPE,U/SHT,SPLIT,FILM,60X84,STERILE: Brand: MEDLINE

## (undated) DEVICE — [THREADED CANNULA.  DO NOT RESTERILIZE,  DO NOT USE IF PACKAGE IS DAMAGED]: Brand: DRI-LOK

## (undated) DEVICE — STRIP,CLOSURE,WOUND,MEDI-STRIP,1/2X4: Brand: MEDLINE

## (undated) DEVICE — 4-PORT MANIFOLD: Brand: NEPTUNE 2

## (undated) DEVICE — SOLUTION IRRIG 3000ML 0.9% SOD CHL USP UROMATIC PLAS CONT

## (undated) DEVICE — TUBING, SUCTION, 1/4" X 10', STRAIGHT: Brand: MEDLINE

## (undated) DEVICE — BLADE SHV L13CM DIA4MM BNE CUT AGG DEB COOLCUT

## (undated) DEVICE — BUR SHV L13CM DIA4MM 8 FLUT OVL FOR RAP AGG BNE RESECT

## (undated) DEVICE — KIT CHAIR TRIMANO FOAM W/ SUPP ARM DRP ERGONOMICALLY DESIGNED